# Patient Record
Sex: FEMALE | Race: WHITE | Employment: FULL TIME | URBAN - METROPOLITAN AREA
[De-identification: names, ages, dates, MRNs, and addresses within clinical notes are randomized per-mention and may not be internally consistent; named-entity substitution may affect disease eponyms.]

---

## 2017-01-16 ENCOUNTER — HOSPITAL ENCOUNTER (OUTPATIENT)
Dept: OTHER | Age: 57
Discharge: HOME | End: 2017-01-16
Attending: FAMILY MEDICINE | Admitting: FAMILY MEDICINE

## 2017-01-16 ENCOUNTER — OFFICE VISIT (OUTPATIENT)
Dept: FAMILY MEDICINE CLINIC | Age: 57
End: 2017-01-16

## 2017-01-16 VITALS
WEIGHT: 158 LBS | BODY MASS INDEX: 26.29 KG/M2 | DIASTOLIC BLOOD PRESSURE: 74 MMHG | SYSTOLIC BLOOD PRESSURE: 118 MMHG | TEMPERATURE: 97.8 F

## 2017-01-16 DIAGNOSIS — J30.1 SEASONAL ALLERGIC RHINITIS DUE TO POLLEN: ICD-10-CM

## 2017-01-16 DIAGNOSIS — J32.4 CHRONIC PANSINUSITIS: Primary | ICD-10-CM

## 2017-01-16 DIAGNOSIS — J32.4 CHRONIC PANSINUSITIS: ICD-10-CM

## 2017-01-16 PROCEDURE — 99213 OFFICE O/P EST LOW 20 MIN: CPT | Performed by: FAMILY MEDICINE

## 2017-01-16 RX ORDER — LEVOFLOXACIN 500 MG/1
500 TABLET, FILM COATED ORAL DAILY
Qty: 10 TABLET | Refills: 0 | Status: SHIPPED | OUTPATIENT
Start: 2017-01-16 | End: 2017-01-26

## 2017-01-16 ASSESSMENT — ENCOUNTER SYMPTOMS
RESPIRATORY NEGATIVE: 1
GASTROINTESTINAL NEGATIVE: 1
EYES NEGATIVE: 1

## 2017-01-17 ENCOUNTER — PATIENT MESSAGE (OUTPATIENT)
Dept: FAMILY MEDICINE CLINIC | Age: 57
End: 2017-01-17

## 2017-01-17 DIAGNOSIS — J01.90 ACUTE SINUSITIS, RECURRENCE NOT SPECIFIED, UNSPECIFIED LOCATION: Primary | ICD-10-CM

## 2017-01-17 PROCEDURE — 99444 PR PHYSICIAN ONLINE EVALUATION & MANAGEMENT SERVICE: CPT | Performed by: FAMILY MEDICINE

## 2017-01-19 DIAGNOSIS — K59.04 CHRONIC IDIOPATHIC CONSTIPATION: Primary | ICD-10-CM

## 2017-01-19 RX ORDER — LUBIPROSTONE 24 UG/1
24 CAPSULE, GELATIN COATED ORAL 2 TIMES DAILY WITH MEALS
Qty: 180 CAPSULE | Refills: 1 | Status: SHIPPED | OUTPATIENT
Start: 2017-01-19 | End: 2017-07-19 | Stop reason: SDUPTHER

## 2017-02-03 DIAGNOSIS — I10 ESSENTIAL HYPERTENSION: Primary | ICD-10-CM

## 2017-02-03 RX ORDER — HYDROCHLOROTHIAZIDE 12.5 MG/1
12.5 TABLET ORAL DAILY
Qty: 90 TABLET | Refills: 1 | Status: SHIPPED | OUTPATIENT
Start: 2017-02-03 | End: 2017-08-02 | Stop reason: SDUPTHER

## 2017-03-07 ENCOUNTER — OFFICE VISIT (OUTPATIENT)
Dept: RHEUMATOLOGY | Age: 57
End: 2017-03-07

## 2017-03-07 VITALS
DIASTOLIC BLOOD PRESSURE: 80 MMHG | WEIGHT: 157.2 LBS | BODY MASS INDEX: 26.16 KG/M2 | SYSTOLIC BLOOD PRESSURE: 110 MMHG | TEMPERATURE: 98.2 F

## 2017-03-07 DIAGNOSIS — L40.9 PSORIASIS: ICD-10-CM

## 2017-03-07 DIAGNOSIS — Z79.899 HIGH RISK MEDICATION USE: ICD-10-CM

## 2017-03-07 DIAGNOSIS — L40.50 PSORIATIC ARTHRITIS (HCC): Primary | ICD-10-CM

## 2017-03-07 PROCEDURE — 99215 OFFICE O/P EST HI 40 MIN: CPT | Performed by: INTERNAL MEDICINE

## 2017-03-22 DIAGNOSIS — F41.9 ANXIETY: ICD-10-CM

## 2017-03-22 RX ORDER — CLONAZEPAM 0.5 MG/1
0.5 TABLET ORAL 2 TIMES DAILY PRN
Qty: 90 TABLET | Refills: 0 | Status: SHIPPED | OUTPATIENT
Start: 2017-03-22 | End: 2017-04-28 | Stop reason: SDUPTHER

## 2017-03-23 DIAGNOSIS — F41.9 ANXIETY: ICD-10-CM

## 2017-03-23 RX ORDER — CLONAZEPAM 0.5 MG/1
0.5 TABLET ORAL 2 TIMES DAILY PRN
Qty: 90 TABLET | Refills: 0 | OUTPATIENT
Start: 2017-03-23

## 2017-04-24 RX ORDER — MELOXICAM 15 MG/1
15 TABLET ORAL DAILY
Qty: 90 TABLET | Refills: 1 | Status: SHIPPED | OUTPATIENT
Start: 2017-04-24 | End: 2017-05-09 | Stop reason: CLARIF

## 2017-04-28 DIAGNOSIS — F41.9 ANXIETY: ICD-10-CM

## 2017-04-29 RX ORDER — CLONAZEPAM 0.5 MG/1
0.5 TABLET ORAL 2 TIMES DAILY PRN
Qty: 90 TABLET | Refills: 0 | Status: SHIPPED | OUTPATIENT
Start: 2017-04-29 | End: 2017-07-24 | Stop reason: SDUPTHER

## 2017-05-04 DIAGNOSIS — Z79.899 HIGH RISK MEDICATION USE: ICD-10-CM

## 2017-05-04 DIAGNOSIS — I10 ESSENTIAL HYPERTENSION: ICD-10-CM

## 2017-05-04 DIAGNOSIS — E78.2 MIXED HYPERLIPIDEMIA: ICD-10-CM

## 2017-05-04 LAB
A/G RATIO: 2.1 (ref 1.1–2.2)
ALBUMIN SERPL-MCNC: 4.6 G/DL (ref 3.4–5)
ALP BLD-CCNC: 94 U/L (ref 40–129)
ALT SERPL-CCNC: 28 U/L (ref 10–40)
ANION GAP SERPL CALCULATED.3IONS-SCNC: 16 MMOL/L (ref 3–16)
AST SERPL-CCNC: 21 U/L (ref 15–37)
BASOPHILS ABSOLUTE: 0.1 K/UL (ref 0–0.2)
BASOPHILS RELATIVE PERCENT: 1.2 %
BILIRUB SERPL-MCNC: <0.2 MG/DL (ref 0–1)
BUN BLDV-MCNC: 26 MG/DL (ref 7–20)
C-REACTIVE PROTEIN: 0.6 MG/L (ref 0–5.1)
CALCIUM SERPL-MCNC: 9.7 MG/DL (ref 8.3–10.6)
CHLORIDE BLD-SCNC: 103 MMOL/L (ref 99–110)
CHOLESTEROL, TOTAL: 175 MG/DL (ref 0–199)
CO2: 26 MMOL/L (ref 21–32)
CREAT SERPL-MCNC: 0.8 MG/DL (ref 0.6–1.1)
EOSINOPHILS ABSOLUTE: 0.1 K/UL (ref 0–0.6)
EOSINOPHILS RELATIVE PERCENT: 2.2 %
GFR AFRICAN AMERICAN: >60
GFR NON-AFRICAN AMERICAN: >60
GLOBULIN: 2.2 G/DL
GLUCOSE BLD-MCNC: 98 MG/DL (ref 70–99)
HCT VFR BLD CALC: 44.5 % (ref 36–48)
HDLC SERPL-MCNC: 47 MG/DL (ref 40–60)
HEMOGLOBIN: 14.1 G/DL (ref 12–16)
LDL CHOLESTEROL CALCULATED: 91 MG/DL
LYMPHOCYTES ABSOLUTE: 2.2 K/UL (ref 1–5.1)
LYMPHOCYTES RELATIVE PERCENT: 33.8 %
MCH RBC QN AUTO: 26.3 PG (ref 26–34)
MCHC RBC AUTO-ENTMCNC: 31.8 G/DL (ref 31–36)
MCV RBC AUTO: 82.9 FL (ref 80–100)
MONOCYTES ABSOLUTE: 0.6 K/UL (ref 0–1.3)
MONOCYTES RELATIVE PERCENT: 8.8 %
NEUTROPHILS ABSOLUTE: 3.4 K/UL (ref 1.7–7.7)
NEUTROPHILS RELATIVE PERCENT: 54 %
PDW BLD-RTO: 13.8 % (ref 12.4–15.4)
PLATELET # BLD: 309 K/UL (ref 135–450)
PMV BLD AUTO: 8.5 FL (ref 5–10.5)
POTASSIUM SERPL-SCNC: 4.9 MMOL/L (ref 3.5–5.1)
RBC # BLD: 5.36 M/UL (ref 4–5.2)
SEDIMENTATION RATE, ERYTHROCYTE: 6 MM/HR (ref 0–30)
SODIUM BLD-SCNC: 145 MMOL/L (ref 136–145)
TOTAL PROTEIN: 6.8 G/DL (ref 6.4–8.2)
TRIGL SERPL-MCNC: 183 MG/DL (ref 0–150)
VLDLC SERPL CALC-MCNC: 37 MG/DL
WBC # BLD: 6.4 K/UL (ref 4–11)

## 2017-05-09 ENCOUNTER — OFFICE VISIT (OUTPATIENT)
Dept: FAMILY MEDICINE CLINIC | Age: 57
End: 2017-05-09

## 2017-05-09 VITALS
HEIGHT: 65 IN | DIASTOLIC BLOOD PRESSURE: 82 MMHG | SYSTOLIC BLOOD PRESSURE: 102 MMHG | OXYGEN SATURATION: 100 % | HEART RATE: 91 BPM | BODY MASS INDEX: 26.06 KG/M2 | TEMPERATURE: 98 F | WEIGHT: 156.4 LBS

## 2017-05-09 DIAGNOSIS — K58.9 IRRITABLE BOWEL SYNDROME WITHOUT DIARRHEA: ICD-10-CM

## 2017-05-09 DIAGNOSIS — D35.2 PITUITARY ADENOMA (HCC): ICD-10-CM

## 2017-05-09 DIAGNOSIS — G51.39 HEMIFACIAL SPASM: ICD-10-CM

## 2017-05-09 DIAGNOSIS — K59.04 CHRONIC IDIOPATHIC CONSTIPATION: ICD-10-CM

## 2017-05-09 DIAGNOSIS — J30.1 SEASONAL ALLERGIC RHINITIS DUE TO POLLEN: ICD-10-CM

## 2017-05-09 DIAGNOSIS — E55.9 VITAMIN D DEFICIENCY: ICD-10-CM

## 2017-05-09 DIAGNOSIS — Z11.4 SCREENING FOR HIV (HUMAN IMMUNODEFICIENCY VIRUS): ICD-10-CM

## 2017-05-09 DIAGNOSIS — Z11.59 NEED FOR HEPATITIS C SCREENING TEST: ICD-10-CM

## 2017-05-09 DIAGNOSIS — Z23 NEED FOR TDAP VACCINATION: ICD-10-CM

## 2017-05-09 DIAGNOSIS — M19.90 ARTHRITIS: ICD-10-CM

## 2017-05-09 DIAGNOSIS — E78.2 MIXED HYPERLIPIDEMIA: ICD-10-CM

## 2017-05-09 DIAGNOSIS — I10 ESSENTIAL HYPERTENSION: ICD-10-CM

## 2017-05-09 DIAGNOSIS — Z00.00 ROUTINE GENERAL MEDICAL EXAMINATION AT A HEALTH CARE FACILITY: Primary | ICD-10-CM

## 2017-05-09 DIAGNOSIS — F41.9 ANXIETY: ICD-10-CM

## 2017-05-09 PROCEDURE — 90715 TDAP VACCINE 7 YRS/> IM: CPT | Performed by: FAMILY MEDICINE

## 2017-05-09 PROCEDURE — 90471 IMMUNIZATION ADMIN: CPT | Performed by: FAMILY MEDICINE

## 2017-05-09 PROCEDURE — 99396 PREV VISIT EST AGE 40-64: CPT | Performed by: FAMILY MEDICINE

## 2017-05-09 ASSESSMENT — ENCOUNTER SYMPTOMS
EYES NEGATIVE: 1
ALLERGIC/IMMUNOLOGIC NEGATIVE: 1
GASTROINTESTINAL NEGATIVE: 1
RESPIRATORY NEGATIVE: 1

## 2017-05-30 ENCOUNTER — OFFICE VISIT (OUTPATIENT)
Dept: DERMATOLOGY | Age: 57
End: 2017-05-30

## 2017-05-30 DIAGNOSIS — Z87.2 HISTORY OF ACTINIC KERATOSES: ICD-10-CM

## 2017-05-30 DIAGNOSIS — D22.9 MULTIPLE NEVI: Primary | ICD-10-CM

## 2017-05-30 DIAGNOSIS — L82.0 INFLAMED SEBORRHEIC KERATOSIS: ICD-10-CM

## 2017-05-30 DIAGNOSIS — L40.9 SCALP PSORIASIS: ICD-10-CM

## 2017-05-30 PROCEDURE — 99214 OFFICE O/P EST MOD 30 MIN: CPT | Performed by: DERMATOLOGY

## 2017-05-30 PROCEDURE — 17110 DESTRUCTION B9 LES UP TO 14: CPT | Performed by: DERMATOLOGY

## 2017-05-30 RX ORDER — TRETINOIN 0.5 MG/G
CREAM TOPICAL
Qty: 45 G | Refills: 3 | Status: SHIPPED | OUTPATIENT
Start: 2017-05-30 | End: 2019-12-02 | Stop reason: SDUPTHER

## 2017-06-01 DIAGNOSIS — I10 ESSENTIAL HYPERTENSION: ICD-10-CM

## 2017-06-01 RX ORDER — LISINOPRIL 20 MG/1
20 TABLET ORAL DAILY
Qty: 90 TABLET | Refills: 1 | Status: SHIPPED | OUTPATIENT
Start: 2017-06-01 | End: 2017-11-30 | Stop reason: SDUPTHER

## 2017-06-06 ENCOUNTER — OFFICE VISIT (OUTPATIENT)
Dept: RHEUMATOLOGY | Age: 57
End: 2017-06-06

## 2017-06-06 VITALS
SYSTOLIC BLOOD PRESSURE: 116 MMHG | TEMPERATURE: 98.5 F | WEIGHT: 158 LBS | HEIGHT: 65 IN | BODY MASS INDEX: 26.33 KG/M2 | HEART RATE: 74 BPM | DIASTOLIC BLOOD PRESSURE: 80 MMHG

## 2017-06-06 DIAGNOSIS — Z79.899 HIGH RISK MEDICATION USE: ICD-10-CM

## 2017-06-06 DIAGNOSIS — L40.50 PSORIATIC ARTHRITIS (HCC): Primary | ICD-10-CM

## 2017-06-06 DIAGNOSIS — L40.9 PSORIASIS: ICD-10-CM

## 2017-06-06 PROCEDURE — 99214 OFFICE O/P EST MOD 30 MIN: CPT | Performed by: INTERNAL MEDICINE

## 2017-06-10 DIAGNOSIS — E78.2 MIXED HYPERLIPIDEMIA: ICD-10-CM

## 2017-06-10 RX ORDER — ATORVASTATIN CALCIUM 20 MG/1
10 TABLET, FILM COATED ORAL DAILY
Qty: 45 TABLET | Refills: 1 | Status: SHIPPED | OUTPATIENT
Start: 2017-06-10 | End: 2017-09-22 | Stop reason: SDUPTHER

## 2017-07-19 DIAGNOSIS — K59.04 CHRONIC IDIOPATHIC CONSTIPATION: ICD-10-CM

## 2017-07-19 RX ORDER — LUBIPROSTONE 24 UG/1
24 CAPSULE, GELATIN COATED ORAL 2 TIMES DAILY WITH MEALS
Qty: 180 CAPSULE | Refills: 1 | Status: SHIPPED | OUTPATIENT
Start: 2017-07-19 | End: 2018-01-15 | Stop reason: SDUPTHER

## 2017-07-24 DIAGNOSIS — F41.9 ANXIETY: ICD-10-CM

## 2017-07-24 RX ORDER — CLONAZEPAM 0.5 MG/1
0.5 TABLET ORAL 2 TIMES DAILY PRN
Qty: 90 TABLET | Refills: 0 | Status: SHIPPED | OUTPATIENT
Start: 2017-07-24 | End: 2017-10-31 | Stop reason: SDUPTHER

## 2017-08-02 DIAGNOSIS — I10 ESSENTIAL HYPERTENSION: ICD-10-CM

## 2017-08-02 RX ORDER — HYDROCHLOROTHIAZIDE 12.5 MG/1
12.5 TABLET ORAL DAILY
Qty: 90 TABLET | Refills: 1 | Status: SHIPPED | OUTPATIENT
Start: 2017-08-02 | End: 2018-02-01 | Stop reason: SDUPTHER

## 2017-08-08 ENCOUNTER — TELEPHONE (OUTPATIENT)
Dept: DERMATOLOGY | Age: 57
End: 2017-08-08

## 2017-09-06 ENCOUNTER — OFFICE VISIT (OUTPATIENT)
Dept: RHEUMATOLOGY | Age: 57
End: 2017-09-06

## 2017-09-06 VITALS
WEIGHT: 160 LBS | HEIGHT: 65 IN | HEART RATE: 80 BPM | DIASTOLIC BLOOD PRESSURE: 78 MMHG | BODY MASS INDEX: 26.66 KG/M2 | SYSTOLIC BLOOD PRESSURE: 118 MMHG | TEMPERATURE: 98 F

## 2017-09-06 DIAGNOSIS — L40.50 PSORIATIC ARTHRITIS (HCC): Primary | ICD-10-CM

## 2017-09-06 DIAGNOSIS — L40.9 PSORIASIS: ICD-10-CM

## 2017-09-06 DIAGNOSIS — M17.0 PRIMARY OSTEOARTHRITIS OF BOTH KNEES: ICD-10-CM

## 2017-09-06 PROCEDURE — 99214 OFFICE O/P EST MOD 30 MIN: CPT | Performed by: INTERNAL MEDICINE

## 2017-09-22 DIAGNOSIS — E78.2 MIXED HYPERLIPIDEMIA: ICD-10-CM

## 2017-09-22 RX ORDER — ATORVASTATIN CALCIUM 20 MG/1
20 TABLET, FILM COATED ORAL DAILY
Qty: 90 TABLET | Refills: 1 | Status: SHIPPED | OUTPATIENT
Start: 2017-09-22 | End: 2018-03-02 | Stop reason: SDUPTHER

## 2017-09-27 RX ORDER — AMOXICILLIN AND CLAVULANATE POTASSIUM 875; 125 MG/1; MG/1
1 TABLET, FILM COATED ORAL 2 TIMES DAILY
Qty: 20 TABLET | Refills: 0 | Status: SHIPPED | OUTPATIENT
Start: 2017-09-27 | End: 2017-12-12 | Stop reason: SDUPTHER

## 2017-09-27 RX ORDER — FLUCONAZOLE 150 MG/1
150 TABLET ORAL ONCE
Qty: 2 TABLET | Refills: 0 | Status: SHIPPED | OUTPATIENT
Start: 2017-09-27 | End: 2017-09-27

## 2017-10-16 DIAGNOSIS — F41.9 ANXIETY: ICD-10-CM

## 2017-10-16 RX ORDER — CLONAZEPAM 0.5 MG/1
0.5 TABLET ORAL 2 TIMES DAILY PRN
Qty: 90 TABLET | Refills: 0 | OUTPATIENT
Start: 2017-10-16

## 2017-10-23 RX ORDER — MELOXICAM 15 MG/1
TABLET ORAL
Qty: 90 TABLET | Refills: 1 | Status: SHIPPED | OUTPATIENT
Start: 2017-10-23 | End: 2018-03-23 | Stop reason: SDUPTHER

## 2017-10-31 ENCOUNTER — OFFICE VISIT (OUTPATIENT)
Dept: FAMILY MEDICINE CLINIC | Age: 57
End: 2017-10-31

## 2017-10-31 ENCOUNTER — TELEPHONE (OUTPATIENT)
Dept: FAMILY MEDICINE CLINIC | Age: 57
End: 2017-10-31

## 2017-10-31 VITALS
DIASTOLIC BLOOD PRESSURE: 78 MMHG | WEIGHT: 159.6 LBS | BODY MASS INDEX: 26.56 KG/M2 | SYSTOLIC BLOOD PRESSURE: 106 MMHG | TEMPERATURE: 98.2 F

## 2017-10-31 DIAGNOSIS — I10 ESSENTIAL HYPERTENSION: ICD-10-CM

## 2017-10-31 DIAGNOSIS — E55.9 VITAMIN D DEFICIENCY: ICD-10-CM

## 2017-10-31 DIAGNOSIS — G25.81 RESTLESS LEGS SYNDROME (RLS): Primary | ICD-10-CM

## 2017-10-31 DIAGNOSIS — E78.2 MIXED HYPERLIPIDEMIA: ICD-10-CM

## 2017-10-31 DIAGNOSIS — K59.09 CHRONIC CONSTIPATION: ICD-10-CM

## 2017-10-31 DIAGNOSIS — F41.9 ANXIETY: ICD-10-CM

## 2017-10-31 DIAGNOSIS — F43.9 STRESS AT HOME: ICD-10-CM

## 2017-10-31 PROCEDURE — 99214 OFFICE O/P EST MOD 30 MIN: CPT | Performed by: FAMILY MEDICINE

## 2017-10-31 RX ORDER — CLONAZEPAM 0.5 MG/1
0.5 TABLET ORAL 2 TIMES DAILY PRN
Qty: 90 TABLET | Refills: 0 | Status: SHIPPED | OUTPATIENT
Start: 2017-10-31 | End: 2017-10-31 | Stop reason: SDUPTHER

## 2017-10-31 RX ORDER — CLONAZEPAM 0.5 MG/1
0.5 TABLET ORAL 2 TIMES DAILY PRN
Qty: 90 TABLET | Refills: 0 | Status: SHIPPED | OUTPATIENT
Start: 2017-10-31 | End: 2017-12-22 | Stop reason: SDUPTHER

## 2017-10-31 ASSESSMENT — PATIENT HEALTH QUESTIONNAIRE - PHQ9
SUM OF ALL RESPONSES TO PHQ QUESTIONS 1-9: 2
2. FEELING DOWN, DEPRESSED OR HOPELESS: 1
SUM OF ALL RESPONSES TO PHQ9 QUESTIONS 1 & 2: 2
1. LITTLE INTEREST OR PLEASURE IN DOING THINGS: 1

## 2017-10-31 ASSESSMENT — ENCOUNTER SYMPTOMS
RESPIRATORY NEGATIVE: 1
GASTROINTESTINAL NEGATIVE: 1
EYES NEGATIVE: 1

## 2017-10-31 NOTE — TELEPHONE ENCOUNTER
Since Pt is getting 90 day Rx of clonazepam, she needs it sent to Mail Order: [facility].   Thanks

## 2017-10-31 NOTE — PROGRESS NOTES
10/31/17    Rip Prows  1960      Chief Complaint   Patient presents with    Hyperlipidemia     Hypertension: Patient here for follow-up of elevated blood pressure. She is exercising and is adherent to low salt diet. Blood pressure is well controlled at home. Cardiac symptoms none. Patient denies chest pain, claudication, fatigue and irregular heart beat. Cardiovascular risk factors: dyslipidemia and hypertension. Use of agents associated with hypertension: none. History of target organ damage: none. Dyslipidemia: Patient presents for evaluation of lipids. Compliance with treatment thus far has been good. A repeat fasting lipid profile was not done. The patient does not use medications that may worsen dyslipidemias (corticosteroids, progestins, anabolic steroids, diuretics, beta-blockers, amiodarone, cyclosporine, olanzapine). The patient exercises intermittently. The patient is not known to have coexisting coronary artery disease. Anxiety: Patient complains of evaluation of anxiety disorder. She has the following anxiety symptoms: chest pain, difficulty concentrating, feelings of losing control. Onset of symptoms was approximately several years ago, controlled since that time. She denies current suicidal and homicidal ideation. Family history significant for no psychiatric illness. Possible organic causes contributing are: none. Risk factors: previous episode of depression Previous treatment includes  and none. She complains of the following side effects from the treatment: none. She has a long history of needing to move legs in the eveing and at night. Uses the clonazepam primarily for that.    Vitals:    10/31/17 0810   BP: 106/78   Temp: 98.2 °F (36.8 °C)         Immunization History   Administered Date(s) Administered    Influenza Nasal 10/06/2010    Influenza Virus Vaccine 09/30/2015, 10/10/2017    Influenza, Quadv, 3 Years and older, IM 11/13/2016    MMR 11/08/2006  Pneumococcal Polysaccharide ([id number]) 12/06/2016    Td 11/08/2006    Tdap (Boostrix, Adacel) 11/08/2006, 05/09/2017       Allergies   Allergen Reactions    Sulfa Antibiotics Rash    Codeine      Outpatient Prescriptions Marked as Taking for the 10/31/17 encounter (Office Visit) with Zack Palencia MD   Medication Sig Dispense Refill    [DISCONTINUED] clonazePAM (KLONOPIN) 0.5 MG tablet Take 1 tablet by mouth 2 times daily as needed for Anxiety 90 tablet 0    meloxicam (MOBIC) 15 MG tablet TAKE 1 TABLET DAILY 90 tablet 1    CLOBEX SPRAY 0.05 % LIQD APPLY TO SCALP DAILY TO TWICE A DAY AS NEEDED FOR FLARES 250 mL 3    atorvastatin (LIPITOR) 20 MG tablet Take 1 tablet by mouth daily 90 tablet 1    hydrochlorothiazide (HYDRODIURIL) 12.5 MG tablet Take 1 tablet by mouth daily 90 tablet 1    lubiprostone (AMITIZA) 24 MCG capsule Take 1 capsule by mouth 2 times daily (with meals) 180 capsule 1    ENBREL SURECLICK 50 MG/ML SOAJ INJECT 50 MG (1 PEN) UNDER THE SKIN EVERY 7 DAYS 3.92 mL 3    lisinopril (PRINIVIL;ZESTRIL) 20 MG tablet Take 1 tablet by mouth daily 90 tablet 1    tretinoin (RETIN-A) 0.05 % cream Apply a pea sized amount to the face QHS 45 g 3    fluticasone (FLONASE) 50 MCG/ACT nasal spray 1 spray by Nasal route daily      estradiol (VAGIFEM) 25 MCG vaginal tablet Place 25 mcg vaginally daily.  Ascorbic Acid (VITAMIN C) 500 MG tablet Take 1,000 mg by mouth daily.  Cholecalciferol (VITAMIN D) 1000 UNIT CAPS Take 1 capsule by mouth daily.  cetirizine (ZYRTEC) 10 MG tablet Take 1 tablet by mouth daily. 90 tablet 1    Flaxseed, Linseed, (FLAXSEED OIL) 1000 MG CAPS Take  by mouth.  Green Tea 250 MG CAPS Take  by mouth daily.  Calcium-Magnesium 426-246 MG TABS Take  by mouth daily.  PROBIOTIC CAPS Take  by mouth daily.          Past Medical History:   Diagnosis Date    Actinic keratosis 6/28/2010    Allergic rhinitis     Anxiety 6/28/2010    Cancer ([facility] [address] ovarian    Chronic idiopathic constipation 2017    Hemifacial spasm     Botox    Hyperlipidemia     Osteoarthritis     Psoriasis     Psoriatic arthritis ([facility] [address]      Past Surgical History:   Procedure Laterality Date    BRAIN SURGERY      for blood vessel abnormality    BREAST REDUCTION SURGERY       SECTION      COLONOSCOPY  12.    FINGER SURGERY  2007    Left index finger     HYSTERECTOMY      INNER EAR SURGERY  2008    repair artery right ear    NEUROMA SURGERY      microvascular reconstruction to fix hemifacial spasm    RHINOPLASTY      SHOULDER SURGERY      left shoulder    AMRITA AND BSO      TOE SURGERY      TONSILLECTOMY      VARICOSE VEIN SURGERY      WRIST SURGERY      right wrist     Family History   Problem Relation Age of Onset    Heart Disease Father      chf    High Blood Pressure Father     High Cholesterol Father     Diabetes Father     Kidney Disease Father     High Blood Pressure Brother     Depression Brother     Diabetes Brother     Cancer Paternal Grandmother     Stroke Paternal Grandmother     Stroke Paternal Grandfather     Cancer Mother      skin    Cancer Sister      breast     Social History     Social History    Marital status:      Spouse name: N/A    Number of children: N/A    Years of education: N/A     Occupational History    Not on file. Social History Main Topics    Smoking status: Never Smoker    Smokeless tobacco: Never Used    Alcohol use Yes    Drug use: No    Sexual activity: Yes     Other Topics Concern    Not on file     Social History Narrative    No narrative on file         Any recent diagnostic tests, hospital reports, office notes or consultation letters were reviewed prior to and during the visit. Review of Systems   Constitutional: Negative. HENT: Negative. Eyes: Negative. Respiratory: Negative. Cardiovascular: Negative. Gastrointestinal: Negative. Genitourinary: Negative.

## 2017-11-02 ENCOUNTER — PROCEDURE VISIT (OUTPATIENT)
Dept: DERMATOLOGY | Age: 57
End: 2017-11-02

## 2017-11-02 DIAGNOSIS — L81.4 LENTIGINES: Primary | ICD-10-CM

## 2017-11-02 DIAGNOSIS — Z41.1 ELECTIVE PROCEDURE FOR UNACCEPTABLE COSMETIC APPEARANCE: ICD-10-CM

## 2017-11-02 PROCEDURE — DM01510 PIGMENTED LASER 6-10 LESIONS: Performed by: DERMATOLOGY

## 2017-11-02 NOTE — PROGRESS NOTES
Laser Procedure Note       Chaitanya Novak (previously Larry Farmer)  YOB: 1960    DATE OF VISIT:  11/2/2017    LASER: IPL/GL  DIAGNOSIS: lentigines     Here for elective removal of tan lesions/lentigines on the arms. Previously discussed laser - IPL + GL for chest and scattered lesions on the arms/legs - 200 + 150 and need for multiple tx for the chest.  Discussed risk crusting/scabbing and erythema, incomplete clearance and wound care. Baseline photos:          PATIENT IDENTIFIED PER PROTOCOL: yes  LOCATION(S): arms  VERIFIED AND MARKED: yes  TECHNIQUES, RISKS, BENEFITS AND ALTERNATIVES EXPLAINED: yes  CONSENT SIGNED, WITNESSED AND DATED: yes        OPERATIVE REPORT    DIAGNOSIS, LOCATION, PROCEDURE RECONFIRMED: yes   EYE PROTECTION: yes  ANESTHESIA/PRE-OP MEDICATIONS: none  LASER SETTINGS:  (1)  WAVELENGTH: IPL - 10 J    (2)  WAVELENGTH: 755 - GL  LENS: 8 mm  FLUENCE: 40 J   COOLING: off    Ed mult trx and may have to trx with Ln2    PROCEDURE NOTE:  US gel applied to the arms/hands and treated with single pass with IPL. Then individual lesions treated with single pulses with setting 2 with appropriate response. POST-OPERATIVE CARE/DISPOSITION: aquaphor  COMPLICATIONS: none  MEDICATIONS: none  WOUND CARE INSTRUCTIONS PROVIDED: yes    F/u 1-2 mos.     [id number] [facility]

## 2017-11-10 ENCOUNTER — TELEPHONE (OUTPATIENT)
Dept: OTHER | Facility: CLINIC | Age: 57
End: 2017-11-10

## 2017-11-10 NOTE — TELEPHONE ENCOUNTER
Attempted to contact patient regarding colonoscopy report. Number was OOS. Need to get a copy of report and attach it to HM order.

## 2017-11-14 DIAGNOSIS — G25.81 RESTLESS LEGS SYNDROME (RLS): ICD-10-CM

## 2017-11-14 DIAGNOSIS — E78.2 MIXED HYPERLIPIDEMIA: ICD-10-CM

## 2017-11-14 DIAGNOSIS — Z11.4 SCREENING FOR HIV (HUMAN IMMUNODEFICIENCY VIRUS): ICD-10-CM

## 2017-11-14 DIAGNOSIS — Z11.59 NEED FOR HEPATITIS C SCREENING TEST: ICD-10-CM

## 2017-11-14 DIAGNOSIS — E55.9 VITAMIN D DEFICIENCY: ICD-10-CM

## 2017-11-14 DIAGNOSIS — Z79.899 HIGH RISK MEDICATION USE: ICD-10-CM

## 2017-11-14 LAB
A/G RATIO: 1.7 (ref 1.1–2.2)
ALBUMIN SERPL-MCNC: 4.3 G/DL (ref 3.4–5)
ALP BLD-CCNC: 84 U/L (ref 40–129)
ALT SERPL-CCNC: 24 U/L (ref 10–40)
ANION GAP SERPL CALCULATED.3IONS-SCNC: 17 MMOL/L (ref 3–16)
AST SERPL-CCNC: 20 U/L (ref 15–37)
BILIRUB SERPL-MCNC: <0.2 MG/DL (ref 0–1)
BUN BLDV-MCNC: 19 MG/DL (ref 7–20)
CALCIUM SERPL-MCNC: 9.5 MG/DL (ref 8.3–10.6)
CHLORIDE BLD-SCNC: 105 MMOL/L (ref 99–110)
CHOLESTEROL, TOTAL: 170 MG/DL (ref 0–199)
CO2: 25 MMOL/L (ref 21–32)
CREAT SERPL-MCNC: 0.8 MG/DL (ref 0.6–1.1)
FERRITIN: 66.5 NG/ML (ref 15–150)
GFR AFRICAN AMERICAN: >60
GFR NON-AFRICAN AMERICAN: >60
GLOBULIN: 2.5 G/DL
GLUCOSE BLD-MCNC: 93 MG/DL (ref 70–99)
HDLC SERPL-MCNC: 46 MG/DL (ref 40–60)
HEPATITIS C ANTIBODY INTERPRETATION: NORMAL
LDL CHOLESTEROL CALCULATED: 72 MG/DL
POTASSIUM SERPL-SCNC: 4.1 MMOL/L (ref 3.5–5.1)
SODIUM BLD-SCNC: 147 MMOL/L (ref 136–145)
TOTAL PROTEIN: 6.8 G/DL (ref 6.4–8.2)
TRIGL SERPL-MCNC: 258 MG/DL (ref 0–150)
VITAMIN D 25-HYDROXY: 48.7 NG/ML
VLDLC SERPL CALC-MCNC: 52 MG/DL

## 2017-11-15 LAB — HIV-1 AND HIV-2 ANTIBODIES: NORMAL

## 2017-11-30 DIAGNOSIS — I10 ESSENTIAL HYPERTENSION: ICD-10-CM

## 2017-11-30 RX ORDER — LISINOPRIL 20 MG/1
20 TABLET ORAL DAILY
Qty: 90 TABLET | Refills: 1 | Status: SHIPPED | OUTPATIENT
Start: 2017-11-30 | End: 2018-05-06 | Stop reason: SDUPTHER

## 2017-12-12 ENCOUNTER — OFFICE VISIT (OUTPATIENT)
Dept: DERMATOLOGY | Age: 57
End: 2017-12-12

## 2017-12-12 ENCOUNTER — OFFICE VISIT (OUTPATIENT)
Dept: FAMILY MEDICINE CLINIC | Age: 57
End: 2017-12-12

## 2017-12-12 VITALS
HEART RATE: 85 BPM | HEIGHT: 65 IN | OXYGEN SATURATION: 97 % | TEMPERATURE: 98.8 F | DIASTOLIC BLOOD PRESSURE: 74 MMHG | SYSTOLIC BLOOD PRESSURE: 110 MMHG

## 2017-12-12 DIAGNOSIS — Z87.2 HISTORY OF ACTINIC KERATOSES: ICD-10-CM

## 2017-12-12 DIAGNOSIS — D22.9 MULTIPLE NEVI: Primary | ICD-10-CM

## 2017-12-12 DIAGNOSIS — J01.90 ACUTE SINUSITIS, RECURRENCE NOT SPECIFIED, UNSPECIFIED LOCATION: ICD-10-CM

## 2017-12-12 DIAGNOSIS — D48.5 NEOPLASM OF UNCERTAIN BEHAVIOR OF SKIN: ICD-10-CM

## 2017-12-12 DIAGNOSIS — L40.9 SCALP PSORIASIS: ICD-10-CM

## 2017-12-12 PROCEDURE — 11101 PR BIOPSY, EACH ADDED LESION: CPT | Performed by: DERMATOLOGY

## 2017-12-12 PROCEDURE — 99213 OFFICE O/P EST LOW 20 MIN: CPT | Performed by: DERMATOLOGY

## 2017-12-12 PROCEDURE — 11100 PR BIOPSY OF SKIN LESION: CPT | Performed by: DERMATOLOGY

## 2017-12-12 RX ORDER — ESTRADIOL 10 UG/1
INSERT VAGINAL
COMMUNITY
Start: 2017-11-28 | End: 2018-03-23 | Stop reason: SDUPTHER

## 2017-12-12 RX ORDER — FLUCONAZOLE 150 MG/1
TABLET ORAL
Qty: 2 TABLET | Refills: 0 | Status: SHIPPED | OUTPATIENT
Start: 2017-12-12 | End: 2017-12-13

## 2017-12-12 RX ORDER — AMOXICILLIN AND CLAVULANATE POTASSIUM 875; 125 MG/1; MG/1
1 TABLET, FILM COATED ORAL 2 TIMES DAILY
Qty: 20 TABLET | Refills: 0 | Status: SHIPPED | OUTPATIENT
Start: 2017-12-12 | End: 2018-04-02 | Stop reason: SDUPTHER

## 2017-12-12 ASSESSMENT — ENCOUNTER SYMPTOMS
SORE THROAT: 0
SHORTNESS OF BREATH: 0
SWOLLEN GLANDS: 0
SINUS COMPLAINT: 1
GASTROINTESTINAL NEGATIVE: 1
SINUS PAIN: 1
ALLERGIC/IMMUNOLOGIC NEGATIVE: 1
HOARSE VOICE: 0
RHINORRHEA: 1
COUGH: 1
EYES NEGATIVE: 1
SINUS PRESSURE: 1

## 2017-12-12 NOTE — PROGRESS NOTES
12/12/17    Agnes Kern  1960      Chief Complaint   Patient presents with    Sinus Problem     cough       Vitals:    12/12/17 0925   BP: 110/74   Pulse: 85   Temp: 98.8 °F (37.1 °C)   SpO2: 97%         Immunization History   Administered Date(s) Administered    Influenza Nasal 10/06/2010    Influenza Virus Vaccine 09/30/2015, 10/10/2017    Influenza, Aleshia Tyson, 3 Years and older, IM 11/13/2016    MMR 11/08/2006    Pneumococcal Polysaccharide ([id number]) 12/06/2016    Td 11/08/2006    Tdap (Boostrix, Adacel) 11/08/2006, 05/09/2017       Allergies   Allergen Reactions    Sulfa Antibiotics Rash    Codeine      Outpatient Prescriptions Marked as Taking for the 12/12/17 encounter (Office Visit) with Lance Hoffmann NP   Medication Sig Dispense Refill    Estradiol (VAGIFEM) 10 MCG TABS vaginal tablet       amoxicillin-clavulanate (AUGMENTIN) 875-125 MG per tablet Take 1 tablet by mouth 2 times daily for 10 days 20 tablet 0    fluconazole (DIFLUCAN) 150 MG tablet Take one po now and repeat one po in three days 2 tablet 0    lisinopril (PRINIVIL;ZESTRIL) 20 MG tablet Take 1 tablet by mouth daily 90 tablet 1    ENBREL SURECLICK 50 MG/ML SOAJ INJECT 50 MG (1 PEN) UNDER THE SKIN EVERY 7 DAYS 3.92 mL 2    clonazePAM (KLONOPIN) 0.5 MG tablet Take 1 tablet by mouth 2 times daily as needed for Anxiety 90 tablet 0    meloxicam (MOBIC) 15 MG tablet TAKE 1 TABLET DAILY 90 tablet 1    CLOBEX SPRAY 0.05 % LIQD APPLY TO SCALP DAILY TO TWICE A DAY AS NEEDED FOR FLARES 250 mL 3    atorvastatin (LIPITOR) 20 MG tablet Take 1 tablet by mouth daily 90 tablet 1    hydrochlorothiazide (HYDRODIURIL) 12.5 MG tablet Take 1 tablet by mouth daily 90 tablet 1    lubiprostone (AMITIZA) 24 MCG capsule Take 1 capsule by mouth 2 times daily (with meals) 180 capsule 1    tretinoin (RETIN-A) 0.05 % cream Apply a pea sized amount to the face QHS 45 g 3    fluticasone (FLONASE) 50 MCG/ACT nasal spray 1 spray by Nasal route on file. Social History Main Topics    Smoking status: Never Smoker    Smokeless tobacco: Never Used    Alcohol use Yes    Drug use: No    Sexual activity: Yes     Other Topics Concern    Not on file     Social History Narrative    No narrative on file         Any recent diagnostic tests, hospital reports, office notes or consultation letters were reviewed prior to and during the visit. Sinus Problem   This is a new problem. The current episode started in the past 7 days. The problem has been gradually worsening since onset. The pain is severe. Associated symptoms include congestion, coughing, headaches and sinus pressure. Pertinent negatives include no chills, diaphoresis, ear pain, hoarse voice, neck pain, shortness of breath, sneezing, sore throat or swollen glands. Past treatments include oral decongestants (antihistamine). The treatment provided no relief. Review of Systems   Constitutional: Negative. Negative for chills and diaphoresis. HENT: Positive for congestion, rhinorrhea, sinus pain and sinus pressure. Negative for ear pain, hoarse voice, sneezing and sore throat. Eyes: Negative. Respiratory: Positive for cough. Negative for shortness of breath. Cardiovascular: Negative. Gastrointestinal: Negative. Endocrine: Negative. Genitourinary: Negative. Musculoskeletal: Negative. Negative for neck pain. Skin: Negative. Allergic/Immunologic: Negative. Neurological: Positive for headaches. Hematological: Negative. Psychiatric/Behavioral: Negative. Physical Exam   Constitutional: She is oriented to person, place, and time. She appears well-developed and well-nourished. HENT:   Head: Normocephalic. Right Ear: External ear normal.   Left Ear: External ear normal.   Mouth/Throat: Oropharynx is clear and moist. No oropharyngeal exudate.    Nasal congestion and facial pain     Eyes: Conjunctivae are normal. Pupils are equal, round, and reactive to

## 2017-12-12 NOTE — PROGRESS NOTES
 NEUROMA SURGERY      microvascular reconstruction to fix hemifacial spasm    RHINOPLASTY      SHOULDER SURGERY      left shoulder    AMRITA AND BSO      TOE SURGERY      TONSILLECTOMY      VARICOSE VEIN SURGERY      WRIST SURGERY      right wrist     Allergies   Allergen Reactions    Sulfa Antibiotics Rash    Codeine      Outpatient Prescriptions Marked as Taking for the 12/12/17 encounter (Office Visit) with Estephanie Greenfield MD   Medication Sig Dispense Refill    lisinopril (PRINIVIL;ZESTRIL) 20 MG tablet Take 1 tablet by mouth daily 90 tablet 1    ENBREL SURECLICK 50 MG/ML SOAJ INJECT 50 MG (1 PEN) UNDER THE SKIN EVERY 7 DAYS 3.92 mL 2    clonazePAM (KLONOPIN) 0.5 MG tablet Take 1 tablet by mouth 2 times daily as needed for Anxiety 90 tablet 0    meloxicam (MOBIC) 15 MG tablet TAKE 1 TABLET DAILY 90 tablet 1    CLOBEX SPRAY 0.05 % LIQD APPLY TO SCALP DAILY TO TWICE A DAY AS NEEDED FOR FLARES 250 mL 3    atorvastatin (LIPITOR) 20 MG tablet Take 1 tablet by mouth daily 90 tablet 1    hydrochlorothiazide (HYDRODIURIL) 12.5 MG tablet Take 1 tablet by mouth daily 90 tablet 1    lubiprostone (AMITIZA) 24 MCG capsule Take 1 capsule by mouth 2 times daily (with meals) 180 capsule 1    tretinoin (RETIN-A) 0.05 % cream Apply a pea sized amount to the face QHS 45 g 3    fluticasone (FLONASE) 50 MCG/ACT nasal spray 1 spray by Nasal route daily      estradiol (VAGIFEM) 25 MCG vaginal tablet Place 25 mcg vaginally daily.  Ascorbic Acid (VITAMIN C) 500 MG tablet Take 1,000 mg by mouth daily.  Cholecalciferol (VITAMIN D) 1000 UNIT CAPS Take 1 capsule by mouth daily.  cetirizine (ZYRTEC) 10 MG tablet Take 1 tablet by mouth daily. 90 tablet 1    Flaxseed, Linseed, (FLAXSEED OIL) 1000 MG CAPS Take  by mouth.  Green Tea 250 MG CAPS Take  by mouth daily.  Calcium-Magnesium 426-246 MG TABS Take  by mouth daily.  PROBIOTIC CAPS Take  by mouth daily.            Social History:  Occupation:  [employer]    Physical Examination     Gen, well-appearing  The following were examined and determined to be normal: Psych/Neuro, Conjunctivae/eyelids, Gums/teeth/lips, Neck, Abdomen, RUE, RLE, LLE, Nails/digits and Groin/buttocks. Back, face, chest. neck  The following were examined and determined to be abnormal: scalp, chest, LUE  1. trunk and extremities with scattered brown macules and papules; darkest is on the abdomen - 2 mm macule - stable  2. No lesions concerning for AK's  3. Occipital scalp with mild erythematous silvery-scaled patches/thin plaque  4. L chest with crusted pink 3 mm papule  L upper anterior thigh with dark brown/red 2-3 mm thin papule    Assessment and Plan     1. Numerous moles, benign-appearing  - educ re ABCD's of MM   educ sun protection   encouraged skin check yearly     2. Hx of AK's, clear today  - sun protection and FSE yearly    3. Scalp psoriasis, stable/mild activity today  - cont topical steroids prn flares; ed se/misuse  - cont Enbrel per rheum    4. R/o irritated nevus vs dysplastic - L upper anterior thigh  R/o ISK vs irritated nevus - L upper chest  - 2 Shave biopsy performed after verbal consent obtained. Patient educated regarding risk of bleeding, infection, scar and educated on wound care. Skin cleansed with alcohol pad and site anesthetized with lido + epi. Aluminum chloride applied to site for hemostasis. Petrolatum ointment and bandage applied. Specimen bottle labeled with patient information and site and specimen sent to dermpath. S/p IPL + GL for lentigines on the arms at last visit - tolerated well and improved but has a few faint \"stripes\" remaining on the R arm.   Already has next trx scheduled for Feb.

## 2017-12-19 ENCOUNTER — TELEPHONE (OUTPATIENT)
Dept: DERMATOLOGY | Age: 57
End: 2017-12-19

## 2017-12-19 NOTE — TELEPHONE ENCOUNTER
L/vm for patient to return call regarding bx results of:    Lt upper anterior thigh-benign nevus. Lt upper chest-squamous acanthoma, benign.

## 2017-12-22 DIAGNOSIS — F41.9 ANXIETY: ICD-10-CM

## 2017-12-22 RX ORDER — CLONAZEPAM 0.5 MG/1
0.5 TABLET ORAL 2 TIMES DAILY PRN
Qty: 90 TABLET | Refills: 0 | Status: SHIPPED | OUTPATIENT
Start: 2017-12-22 | End: 2018-03-07 | Stop reason: SDUPTHER

## 2018-01-04 ENCOUNTER — PROCEDURE VISIT (OUTPATIENT)
Dept: DERMATOLOGY | Age: 58
End: 2018-01-04

## 2018-01-04 DIAGNOSIS — Z41.1 ELECTIVE PROCEDURE FOR UNACCEPTABLE COSMETIC APPEARANCE: ICD-10-CM

## 2018-01-04 DIAGNOSIS — L81.9 HYPERPIGMENTATION: Primary | ICD-10-CM

## 2018-01-04 PROCEDURE — DM01520 PIGMENTED LASER SMALL AREA (EG. HANDS): Performed by: DERMATOLOGY

## 2018-01-15 DIAGNOSIS — K59.04 CHRONIC IDIOPATHIC CONSTIPATION: ICD-10-CM

## 2018-01-15 RX ORDER — LUBIPROSTONE 24 UG/1
24 CAPSULE, GELATIN COATED ORAL 2 TIMES DAILY WITH MEALS
Qty: 180 CAPSULE | Refills: 1 | Status: SHIPPED | OUTPATIENT
Start: 2018-01-15 | End: 2018-03-23 | Stop reason: SDUPTHER

## 2018-01-25 ENCOUNTER — OFFICE VISIT (OUTPATIENT)
Dept: RHEUMATOLOGY | Age: 58
End: 2018-01-25

## 2018-01-25 VITALS
TEMPERATURE: 98.4 F | BODY MASS INDEX: 26.33 KG/M2 | HEIGHT: 65 IN | DIASTOLIC BLOOD PRESSURE: 82 MMHG | SYSTOLIC BLOOD PRESSURE: 120 MMHG | WEIGHT: 158 LBS

## 2018-01-25 DIAGNOSIS — L40.9 PSORIASIS: ICD-10-CM

## 2018-01-25 DIAGNOSIS — L40.50 PSORIATIC ARTHRITIS (HCC): Primary | ICD-10-CM

## 2018-01-25 DIAGNOSIS — Z79.899 HIGH RISK MEDICATION USE: ICD-10-CM

## 2018-01-25 PROCEDURE — 99214 OFFICE O/P EST MOD 30 MIN: CPT | Performed by: INTERNAL MEDICINE

## 2018-01-25 NOTE — PROGRESS NOTES
otherwise. All other review of systems are negative. Blood work reviewed, within normal limits. Past Medical History:   Diagnosis Date    Actinic keratosis 2010    Allergic rhinitis     Anxiety 2010    Cancer ([facility] [address]     ovarian    Chronic idiopathic constipation 2017    Hemifacial spasm     Botox    Hyperlipidemia     Osteoarthritis     Psoriasis     Psoriatic arthritis ([facility] [address]      Past Surgical History:   Procedure Laterality Date    BRAIN SURGERY      for blood vessel abnormality    BREAST REDUCTION SURGERY       SECTION      COLONOSCOPY      FINGER SURGERY  2007    Left index finger     HYSTERECTOMY      INNER EAR SURGERY      repair artery right ear    NEUROMA SURGERY      microvascular reconstruction to fix hemifacial spasm    RHINOPLASTY      SHOULDER SURGERY      left shoulder    AMRITA AND BSO      TOE SURGERY      TONSILLECTOMY      VARICOSE VEIN SURGERY      WRIST SURGERY      right wrist     Person, family history reviewed and updated. Current Outpatient Prescriptions   Medication Sig Dispense Refill    lubiprostone (AMITIZA) 24 MCG capsule Take 1 capsule by mouth 2 times daily (with meals) 180 capsule 1    clonazePAM (KLONOPIN) 0.5 MG tablet Take 1 tablet by mouth 2 times daily as needed for Anxiety .  90 tablet 0    Estradiol (VAGIFEM) 10 MCG TABS vaginal tablet       lisinopril (PRINIVIL;ZESTRIL) 20 MG tablet Take 1 tablet by mouth daily 90 tablet 1    ENBREL SURECLICK 50 MG/ML SOAJ INJECT 50 MG (1 PEN) UNDER THE SKIN EVERY 7 DAYS 3.92 mL 2    meloxicam (MOBIC) 15 MG tablet TAKE 1 TABLET DAILY 90 tablet 1    CLOBEX SPRAY 0.05 % LIQD APPLY TO SCALP DAILY TO TWICE A DAY AS NEEDED FOR FLARES 250 mL 3    atorvastatin (LIPITOR) 20 MG tablet Take 1 tablet by mouth daily 90 tablet 1    hydrochlorothiazide (HYDRODIURIL) 12.5 MG tablet Take 1 tablet by mouth daily 90 tablet 1    tretinoin (RETIN-A) 0.05 % cream Apply a pea sized amount to the face QHS 45 g 3    fluticasone (FLONASE) 50 MCG/ACT nasal spray 1 spray by Nasal route daily      Ascorbic Acid (VITAMIN C) 500 MG tablet Take 1,000 mg by mouth daily.  Cholecalciferol (VITAMIN D) 1000 UNIT CAPS Take 1 capsule by mouth daily.  cetirizine (ZYRTEC) 10 MG tablet Take 1 tablet by mouth daily. 90 tablet 1    Flaxseed, Linseed, (FLAXSEED OIL) 1000 MG CAPS Take  by mouth.  Green Tea 250 MG CAPS Take  by mouth daily.  Calcium-Magnesium 426-246 MG TABS Take  by mouth daily.  PROBIOTIC CAPS Take  by mouth daily. No current facility-administered medications for this visit. Allergies   Allergen Reactions    Sulfa Antibiotics Rash    Codeine          OBJECTIVE  Physical    Vitals:    01/25/18 0809   BP: 120/82   Temp: 98.4 °F (36.9 °C)       General appearance/psychiatric: Well nourished and well groomed, normal judgment. Alert, appears stated age and cooperative. MKS: R wrist - surgery for arthritic deformity. Toes- 2,3 - corrective toe surgery    28 joint JOINT COUNT:28 joint count 0                               Right                                                  Left   Swell Tender Swell Tender   PIP1           PIP2           PIP3           PIP4          PIP5          MCP1           MCP2           MCP3           MCP4           MCP5           Wrist           Elbow           Shoulder          Knee             I do not appreciate any tender, swollen or inflamed joints in upper and lower extremities. Bony crepitus of knees without any swelling or tenderness or effusion. Full range of motion in all peripheral joints. Gait- Normal   Normal spine exam.  Skin: no rashes. Denies any skin thickening or digital ischemia. HEENT: Normal lids/conjunctiva and pupils. No oral or nasal ulcers. Salivary glands reveals no evidence of abnormality. Neurologic: normal deep tendon reflexes. No foot or wrist drop.       Data:  Lab Results   Component Value Date    WBC 6.4

## 2018-02-01 DIAGNOSIS — I10 ESSENTIAL HYPERTENSION: ICD-10-CM

## 2018-02-01 RX ORDER — HYDROCHLOROTHIAZIDE 12.5 MG/1
12.5 TABLET ORAL DAILY
Qty: 90 TABLET | Refills: 1 | Status: SHIPPED | OUTPATIENT
Start: 2018-02-01 | End: 2018-07-31 | Stop reason: SDUPTHER

## 2018-02-14 ENCOUNTER — OFFICE VISIT (OUTPATIENT)
Dept: FAMILY MEDICINE CLINIC | Age: 58
End: 2018-02-14

## 2018-02-14 ENCOUNTER — PATIENT MESSAGE (OUTPATIENT)
Dept: FAMILY MEDICINE CLINIC | Age: 58
End: 2018-02-14

## 2018-02-14 VITALS
OXYGEN SATURATION: 98 % | HEART RATE: 83 BPM | SYSTOLIC BLOOD PRESSURE: 110 MMHG | WEIGHT: 153.4 LBS | BODY MASS INDEX: 25.56 KG/M2 | DIASTOLIC BLOOD PRESSURE: 60 MMHG | HEIGHT: 65 IN | TEMPERATURE: 98.6 F

## 2018-02-14 DIAGNOSIS — J06.9 URI, ACUTE: Primary | ICD-10-CM

## 2018-02-14 RX ORDER — BROMPHENIRAMINE MALEATE, PSEUDOEPHEDRINE HYDROCHLORIDE, AND DEXTROMETHORPHAN HYDROBROMIDE 2; 30; 10 MG/5ML; MG/5ML; MG/5ML
5 SYRUP ORAL 4 TIMES DAILY PRN
Qty: 200 ML | Refills: 0 | Status: SHIPPED | OUTPATIENT
Start: 2018-02-14 | End: 2018-05-18 | Stop reason: ALTCHOICE

## 2018-02-14 RX ORDER — AMOXICILLIN AND CLAVULANATE POTASSIUM 875; 125 MG/1; MG/1
TABLET, FILM COATED ORAL
Refills: 0 | COMMUNITY
Start: 2018-02-06 | End: 2018-02-14 | Stop reason: ALTCHOICE

## 2018-02-14 RX ORDER — LEVOFLOXACIN 500 MG/1
500 TABLET, FILM COATED ORAL DAILY
Qty: 10 TABLET | Refills: 0 | Status: SHIPPED | OUTPATIENT
Start: 2018-02-14 | End: 2018-02-24

## 2018-02-15 RX ORDER — FLUCONAZOLE 150 MG/1
150 TABLET ORAL ONCE
Qty: 2 TABLET | Refills: 0 | Status: SHIPPED | OUTPATIENT
Start: 2018-02-15 | End: 2018-02-15

## 2018-02-15 ASSESSMENT — ENCOUNTER SYMPTOMS
NAUSEA: 0
SWOLLEN GLANDS: 0
SINUS PRESSURE: 1
RHINORRHEA: 1
COUGH: 1
WHEEZING: 0
SORE THROAT: 0
GASTROINTESTINAL NEGATIVE: 1
DIARRHEA: 0
ABDOMINAL PAIN: 0
ALLERGIC/IMMUNOLOGIC NEGATIVE: 1
EYES NEGATIVE: 1
VOMITING: 0
SINUS PAIN: 1

## 2018-02-15 NOTE — TELEPHONE ENCOUNTER
From: Toby Brantley  To: Saundra Ashton NP  Sent: 2/14/2018 6:58 PM EST  Subject: Prescription Question    Neda:    I didn't get the diflucan prescription called in for me. Can you call it in tomorrow?     Thanks,  oJaquin Bergman

## 2018-02-23 ENCOUNTER — PATIENT MESSAGE (OUTPATIENT)
Dept: FAMILY MEDICINE CLINIC | Age: 58
End: 2018-02-23

## 2018-02-23 RX ORDER — FLUCONAZOLE 150 MG/1
150 TABLET ORAL ONCE
Qty: 2 TABLET | Refills: 0 | Status: SHIPPED | OUTPATIENT
Start: 2018-02-23 | End: 2018-02-23

## 2018-02-24 NOTE — PROGRESS NOTES
I have reviewed the above note.  I agree with the assessment and treatment plan
nasal spray 1 spray by Nasal route daily      Ascorbic Acid (VITAMIN C) 500 MG tablet Take 1,000 mg by mouth daily.  Cholecalciferol (VITAMIN D) 1000 UNIT CAPS Take 1 capsule by mouth daily.  cetirizine (ZYRTEC) 10 MG tablet Take 1 tablet by mouth daily. 90 tablet 1    Flaxseed, Linseed, (FLAXSEED OIL) 1000 MG CAPS Take  by mouth.  Green Tea 250 MG CAPS Take  by mouth daily.  PROBIOTIC CAPS Take  by mouth daily. Past Medical History:   Diagnosis Date    Actinic keratosis 2010    Allergic rhinitis     Anxiety 2010    Cancer ([facility] [address]     ovarian    Chronic idiopathic constipation 2017    Hemifacial spasm     Botox    Hyperlipidemia     Osteoarthritis     Psoriasis     Psoriatic arthritis ([facility] [address]      Past Surgical History:   Procedure Laterality Date    BRAIN SURGERY      for blood vessel abnormality    BREAST REDUCTION SURGERY       SECTION      COLONOSCOPY  12    FINGER SURGERY  2007    Left index finger     HYSTERECTOMY      INNER EAR SURGERY  2008    repair artery right ear    NEUROMA SURGERY      microvascular reconstruction to fix hemifacial spasm    RHINOPLASTY      SHOULDER SURGERY      left shoulder    AMRITA AND BSO      TOE SURGERY      TONSILLECTOMY      VARICOSE VEIN SURGERY      WRIST SURGERY      right wrist     Family History   Problem Relation Age of Onset    Heart Disease Father      chf    High Blood Pressure Father     High Cholesterol Father     Diabetes Father     Kidney Disease Father     High Blood Pressure Brother     Depression Brother     Diabetes Brother     Cancer Paternal Grandmother     Stroke Paternal Grandmother     Stroke Paternal Grandfather     Cancer Mother      skin    Cancer Sister      breast     Social History     Social History    Marital status:      Spouse name: N/A    Number of children: N/A    Years of education: N/A     Occupational History    Not on file.      Social

## 2018-03-01 ENCOUNTER — PROCEDURE VISIT (OUTPATIENT)
Dept: DERMATOLOGY | Age: 58
End: 2018-03-01

## 2018-03-01 DIAGNOSIS — L81.4 LENTIGINES: Primary | ICD-10-CM

## 2018-03-01 DIAGNOSIS — L82.1 SEBORRHEIC KERATOSIS: ICD-10-CM

## 2018-03-01 DIAGNOSIS — Z41.1 ELECTIVE PROCEDURE FOR UNACCEPTABLE COSMETIC APPEARANCE: ICD-10-CM

## 2018-03-01 PROCEDURE — DM01510 PIGMENTED LASER 6-10 LESIONS: Performed by: DERMATOLOGY

## 2018-03-02 DIAGNOSIS — E78.2 MIXED HYPERLIPIDEMIA: ICD-10-CM

## 2018-03-02 RX ORDER — ATORVASTATIN CALCIUM 20 MG/1
20 TABLET, FILM COATED ORAL DAILY
Qty: 90 TABLET | Refills: 1 | Status: SHIPPED | OUTPATIENT
Start: 2018-03-02 | End: 2019-05-14 | Stop reason: SDUPTHER

## 2018-03-07 ENCOUNTER — TELEPHONE (OUTPATIENT)
Dept: FAMILY MEDICINE CLINIC | Age: 58
End: 2018-03-07

## 2018-03-07 DIAGNOSIS — F41.9 ANXIETY: ICD-10-CM

## 2018-03-07 RX ORDER — CLONAZEPAM 0.5 MG/1
0.5 TABLET ORAL 2 TIMES DAILY PRN
Qty: 90 TABLET | Refills: 0 | Status: SHIPPED | OUTPATIENT
Start: 2018-03-07 | End: 2018-04-09 | Stop reason: SDUPTHER

## 2018-03-22 DIAGNOSIS — F41.9 ANXIETY: ICD-10-CM

## 2018-03-22 RX ORDER — CLONAZEPAM 0.5 MG/1
0.5 TABLET ORAL 2 TIMES DAILY PRN
Qty: 90 TABLET | Refills: 0 | Status: CANCELLED | OUTPATIENT
Start: 2018-03-22 | End: 2018-06-05

## 2018-03-23 DIAGNOSIS — F41.9 ANXIETY: ICD-10-CM

## 2018-03-23 RX ORDER — CETIRIZINE HYDROCHLORIDE 10 MG/1
10 TABLET ORAL
COMMUNITY
Start: 2010-03-01 | End: 2020-04-17 | Stop reason: ALTCHOICE

## 2018-03-23 RX ORDER — LUBIPROSTONE 24 UG/1
24 CAPSULE, GELATIN COATED ORAL
COMMUNITY
Start: 2007-09-17 | End: 2019-08-15 | Stop reason: ALTCHOICE

## 2018-03-23 RX ORDER — MELOXICAM 15 MG/1
15 TABLET ORAL
COMMUNITY
Start: 2010-03-01 | End: 2018-04-20 | Stop reason: SDUPTHER

## 2018-03-23 RX ORDER — CLONAZEPAM 0.5 MG/1
0.5 TABLET ORAL 2 TIMES DAILY PRN
Qty: 90 TABLET | Refills: 0 | OUTPATIENT
Start: 2018-03-23 | End: 2018-06-06

## 2018-03-23 RX ORDER — ESTRADIOL 10 UG/1
INSERT VAGINAL
COMMUNITY
Start: 2018-02-22 | End: 2018-07-31 | Stop reason: SDUPTHER

## 2018-03-23 RX ORDER — METHYLPREDNISOLONE 4 MG/1
TABLET ORAL
Refills: 0 | COMMUNITY
Start: 2018-02-06 | End: 2018-04-02

## 2018-03-23 NOTE — TELEPHONE ENCOUNTER
1 order pending  Requesting 90 day supply thru mail order  [facility] Sick Visit on 2/14/18  Last Fill 3/7/18 at local pharmacy

## 2018-04-02 ENCOUNTER — OFFICE VISIT (OUTPATIENT)
Dept: FAMILY MEDICINE CLINIC | Age: 58
End: 2018-04-02

## 2018-04-02 VITALS
BODY MASS INDEX: 24.79 KG/M2 | SYSTOLIC BLOOD PRESSURE: 120 MMHG | DIASTOLIC BLOOD PRESSURE: 82 MMHG | TEMPERATURE: 98.5 F | HEIGHT: 65 IN | WEIGHT: 148.8 LBS | OXYGEN SATURATION: 100 % | HEART RATE: 86 BPM

## 2018-04-02 DIAGNOSIS — J02.9 PHARYNGITIS, UNSPECIFIED ETIOLOGY: ICD-10-CM

## 2018-04-02 DIAGNOSIS — J06.9 URI, ACUTE: Primary | ICD-10-CM

## 2018-04-02 LAB
INFLUENZA A ANTIGEN, POC: NORMAL
INFLUENZA B ANTIGEN, POC: NORMAL
S PYO AG THROAT QL: NORMAL

## 2018-04-02 RX ORDER — AMOXICILLIN AND CLAVULANATE POTASSIUM 875; 125 MG/1; MG/1
1 TABLET, FILM COATED ORAL 2 TIMES DAILY
Qty: 20 TABLET | Refills: 0 | Status: SHIPPED | OUTPATIENT
Start: 2018-04-02 | End: 2018-04-12

## 2018-04-02 RX ORDER — FLUCONAZOLE 150 MG/1
TABLET ORAL
Qty: 2 TABLET | Refills: 0 | Status: SHIPPED | OUTPATIENT
Start: 2018-04-02 | End: 2018-04-03

## 2018-04-02 ASSESSMENT — ENCOUNTER SYMPTOMS
WHEEZING: 0
SORE THROAT: 1
COUGH: 1
RHINORRHEA: 0
SWOLLEN GLANDS: 0
GASTROINTESTINAL NEGATIVE: 1
SINUS PAIN: 1
ABDOMINAL PAIN: 0
EYES NEGATIVE: 1
DIARRHEA: 0
NAUSEA: 0
VOMITING: 0
SINUS PRESSURE: 1
ALLERGIC/IMMUNOLOGIC NEGATIVE: 1

## 2018-04-04 LAB — THROAT CULTURE: NORMAL

## 2018-04-05 ENCOUNTER — TELEPHONE (OUTPATIENT)
Dept: FAMILY MEDICINE CLINIC | Age: 58
End: 2018-04-05

## 2018-04-05 RX ORDER — LEVOFLOXACIN 500 MG/1
500 TABLET, FILM COATED ORAL DAILY
Qty: 10 TABLET | Refills: 0 | Status: SHIPPED | OUTPATIENT
Start: 2018-04-05 | End: 2018-04-15

## 2018-04-09 DIAGNOSIS — F41.9 ANXIETY: ICD-10-CM

## 2018-04-09 RX ORDER — CLONAZEPAM 0.5 MG/1
0.5 TABLET ORAL 2 TIMES DAILY PRN
Qty: 90 TABLET | Refills: 0 | Status: SHIPPED | OUTPATIENT
Start: 2018-04-09 | End: 2018-07-05 | Stop reason: SDUPTHER

## 2018-04-20 RX ORDER — MELOXICAM 15 MG/1
15 TABLET ORAL DAILY
Qty: 90 TABLET | Refills: 1 | Status: SHIPPED | OUTPATIENT
Start: 2018-04-20 | End: 2018-10-17 | Stop reason: SDUPTHER

## 2018-05-06 DIAGNOSIS — I10 ESSENTIAL HYPERTENSION: ICD-10-CM

## 2018-05-06 RX ORDER — LISINOPRIL 20 MG/1
20 TABLET ORAL DAILY
Qty: 90 TABLET | Refills: 1 | Status: SHIPPED | OUTPATIENT
Start: 2018-05-06 | End: 2019-05-07 | Stop reason: SDUPTHER

## 2018-05-18 ENCOUNTER — OFFICE VISIT (OUTPATIENT)
Dept: FAMILY MEDICINE CLINIC | Age: 58
End: 2018-05-18

## 2018-05-18 VITALS
WEIGHT: 148.2 LBS | DIASTOLIC BLOOD PRESSURE: 86 MMHG | BODY MASS INDEX: 24.69 KG/M2 | OXYGEN SATURATION: 99 % | TEMPERATURE: 98.4 F | HEIGHT: 65 IN | SYSTOLIC BLOOD PRESSURE: 122 MMHG | RESPIRATION RATE: 16 BRPM | HEART RATE: 95 BPM

## 2018-05-18 DIAGNOSIS — M81.0 POSTMENOPAUSAL OSTEOPOROSIS: ICD-10-CM

## 2018-05-18 DIAGNOSIS — Z00.00 ANNUAL PHYSICAL EXAM: Primary | ICD-10-CM

## 2018-05-18 LAB
BILIRUBIN, POC: NEGATIVE
BLOOD URINE, POC: NEGATIVE
CLARITY, POC: CLEAR
COLOR, POC: YELLOW
GLUCOSE URINE, POC: NEGATIVE
KETONES, POC: NEGATIVE
LEUKOCYTE EST, POC: NEGATIVE
NITRITE, POC: NEGATIVE
PH, POC: 6
PROTEIN, POC: NEGATIVE
SPECIFIC GRAVITY, POC: 1.03
UROBILINOGEN, POC: 0.2

## 2018-06-04 ENCOUNTER — OFFICE VISIT (OUTPATIENT)
Dept: RHEUMATOLOGY | Age: 58
End: 2018-06-04

## 2018-06-04 VITALS
DIASTOLIC BLOOD PRESSURE: 84 MMHG | HEART RATE: 83 BPM | WEIGHT: 151 LBS | TEMPERATURE: 98.5 F | HEIGHT: 65 IN | BODY MASS INDEX: 25.16 KG/M2 | SYSTOLIC BLOOD PRESSURE: 118 MMHG

## 2018-06-04 DIAGNOSIS — L40.9 PSORIASIS: ICD-10-CM

## 2018-06-04 DIAGNOSIS — Z00.00 ANNUAL PHYSICAL EXAM: ICD-10-CM

## 2018-06-04 DIAGNOSIS — Z79.899 HIGH RISK MEDICATION USE: ICD-10-CM

## 2018-06-04 DIAGNOSIS — L40.50 PSORIATIC ARTHRITIS (HCC): Primary | ICD-10-CM

## 2018-06-04 LAB
A/G RATIO: 2.1 (ref 1.1–2.2)
ALBUMIN SERPL-MCNC: 4.8 G/DL (ref 3.4–5)
ALP BLD-CCNC: 86 U/L (ref 40–129)
ALT SERPL-CCNC: 21 U/L (ref 10–40)
ANION GAP SERPL CALCULATED.3IONS-SCNC: 11 MMOL/L (ref 3–16)
AST SERPL-CCNC: 20 U/L (ref 15–37)
BASOPHILS ABSOLUTE: 0.1 K/UL (ref 0–0.2)
BASOPHILS RELATIVE PERCENT: 1.4 %
BILIRUB SERPL-MCNC: 0.4 MG/DL (ref 0–1)
BUN BLDV-MCNC: 22 MG/DL (ref 7–20)
CALCIUM SERPL-MCNC: 10 MG/DL (ref 8.3–10.6)
CHLORIDE BLD-SCNC: 105 MMOL/L (ref 99–110)
CHOLESTEROL, TOTAL: 179 MG/DL (ref 0–199)
CO2: 28 MMOL/L (ref 21–32)
CREAT SERPL-MCNC: 0.7 MG/DL (ref 0.6–1.1)
EOSINOPHILS ABSOLUTE: 0.2 K/UL (ref 0–0.6)
EOSINOPHILS RELATIVE PERCENT: 3.8 %
GFR AFRICAN AMERICAN: >60
GFR NON-AFRICAN AMERICAN: >60
GLOBULIN: 2.3 G/DL
GLUCOSE BLD-MCNC: 94 MG/DL (ref 70–99)
HCT VFR BLD CALC: 42.2 % (ref 36–48)
HDLC SERPL-MCNC: 53 MG/DL (ref 40–60)
HEMOGLOBIN: 14 G/DL (ref 12–16)
LDL CHOLESTEROL CALCULATED: 97 MG/DL
LYMPHOCYTES ABSOLUTE: 1.9 K/UL (ref 1–5.1)
LYMPHOCYTES RELATIVE PERCENT: 36.2 %
MCH RBC QN AUTO: 27.3 PG (ref 26–34)
MCHC RBC AUTO-ENTMCNC: 33.2 G/DL (ref 31–36)
MCV RBC AUTO: 82.1 FL (ref 80–100)
MONOCYTES ABSOLUTE: 0.5 K/UL (ref 0–1.3)
MONOCYTES RELATIVE PERCENT: 8.7 %
NEUTROPHILS ABSOLUTE: 2.7 K/UL (ref 1.7–7.7)
NEUTROPHILS RELATIVE PERCENT: 49.9 %
PDW BLD-RTO: 13.7 % (ref 12.4–15.4)
PLATELET # BLD: 300 K/UL (ref 135–450)
PMV BLD AUTO: 8.6 FL (ref 5–10.5)
POTASSIUM SERPL-SCNC: 4.6 MMOL/L (ref 3.5–5.1)
RBC # BLD: 5.14 M/UL (ref 4–5.2)
SODIUM BLD-SCNC: 144 MMOL/L (ref 136–145)
T4 FREE: 1.2 NG/DL (ref 0.9–1.8)
TOTAL PROTEIN: 7.1 G/DL (ref 6.4–8.2)
TRIGL SERPL-MCNC: 145 MG/DL (ref 0–150)
TSH SERPL DL<=0.05 MIU/L-ACNC: 4.83 UIU/ML (ref 0.27–4.2)
VITAMIN D 25-HYDROXY: 70.7 NG/ML
VLDLC SERPL CALC-MCNC: 29 MG/DL
WBC # BLD: 5.4 K/UL (ref 4–11)

## 2018-06-04 PROCEDURE — 99214 OFFICE O/P EST MOD 30 MIN: CPT | Performed by: INTERNAL MEDICINE

## 2018-06-18 ENCOUNTER — TELEPHONE (OUTPATIENT)
Dept: DERMATOLOGY | Age: 58
End: 2018-06-18

## 2018-06-22 ENCOUNTER — HOSPITAL ENCOUNTER (OUTPATIENT)
Dept: GENERAL RADIOLOGY | Age: 58
Discharge: HOME | End: 2018-06-22
Attending: NURSE PRACTITIONER | Admitting: NURSE PRACTITIONER

## 2018-06-22 DIAGNOSIS — Z13.820 ENCOUNTER FOR IMAGING TO ASSESS OSTEOPOROSIS: ICD-10-CM

## 2018-06-22 DIAGNOSIS — M81.0 AGE-RELATED OSTEOPOROSIS WITHOUT CURRENT PATHOLOGICAL FRACTURE: ICD-10-CM

## 2018-06-25 ENCOUNTER — OFFICE VISIT (OUTPATIENT)
Dept: FAMILY MEDICINE CLINIC | Age: 58
End: 2018-06-25

## 2018-06-25 VITALS
DIASTOLIC BLOOD PRESSURE: 60 MMHG | SYSTOLIC BLOOD PRESSURE: 100 MMHG | OXYGEN SATURATION: 99 % | HEART RATE: 97 BPM | TEMPERATURE: 99 F | WEIGHT: 152.8 LBS | HEIGHT: 65 IN | BODY MASS INDEX: 25.46 KG/M2

## 2018-06-25 DIAGNOSIS — L02.93 CARBUNCLE: Primary | ICD-10-CM

## 2018-06-25 RX ORDER — FLUCONAZOLE 150 MG/1
TABLET ORAL
Qty: 2 TABLET | Refills: 0 | Status: SHIPPED | OUTPATIENT
Start: 2018-06-25 | End: 2018-06-26

## 2018-06-25 RX ORDER — CLINDAMYCIN HYDROCHLORIDE 300 MG/1
300 CAPSULE ORAL 3 TIMES DAILY
Qty: 30 CAPSULE | Refills: 0 | Status: SHIPPED | OUTPATIENT
Start: 2018-06-25 | End: 2018-07-05

## 2018-06-25 ASSESSMENT — ENCOUNTER SYMPTOMS
ROS SKIN COMMENTS: CYST ON BACK
GASTROINTESTINAL NEGATIVE: 1
ALLERGIC/IMMUNOLOGIC NEGATIVE: 1
EYES NEGATIVE: 1
RESPIRATORY NEGATIVE: 1

## 2018-06-27 ENCOUNTER — TELEPHONE (OUTPATIENT)
Dept: FAMILY MEDICINE CLINIC | Age: 58
End: 2018-06-27

## 2018-06-27 ENCOUNTER — TELEPHONE (OUTPATIENT)
Dept: DERMATOLOGY | Age: 58
End: 2018-06-27

## 2018-06-28 LAB
GRAM STAIN RESULT: ABNORMAL
ORGANISM: ABNORMAL
WOUND/ABSCESS: ABNORMAL
WOUND/ABSCESS: ABNORMAL

## 2018-06-29 ENCOUNTER — TELEPHONE (OUTPATIENT)
Dept: DERMATOLOGY | Age: 58
End: 2018-06-29

## 2018-06-29 NOTE — TELEPHONE ENCOUNTER
Pt c/b [phone]  Pt states:   - she tested positive for staph   - would like to be seen next wk on Thursday  Please call to discuss thanks

## 2018-06-29 NOTE — TELEPHONE ENCOUNTER
Spoke to patient-she would like Dr. Jaen Montoya to check the cyst on her back.  Advised patient that we will not removed the area on Thursday-we can schedule a few weeks out, so the area can heal.     Ok to schedule on 7/5/2018-per patient's request?

## 2018-07-02 NOTE — TELEPHONE ENCOUNTER
Pt calling Roxi Zamorano back regarding appt time for 7/5 pls return pt call back @ [phone] thank you

## 2018-07-05 ENCOUNTER — OFFICE VISIT (OUTPATIENT)
Dept: DERMATOLOGY | Age: 58
End: 2018-07-05

## 2018-07-05 DIAGNOSIS — F41.9 ANXIETY: ICD-10-CM

## 2018-07-05 DIAGNOSIS — L72.3 INFLAMED EPIDERMOID CYST OF SKIN: Primary | ICD-10-CM

## 2018-07-05 PROCEDURE — 99212 OFFICE O/P EST SF 10 MIN: CPT | Performed by: DERMATOLOGY

## 2018-07-05 RX ORDER — CLONAZEPAM 0.5 MG/1
0.5 TABLET ORAL 2 TIMES DAILY PRN
Qty: 90 TABLET | Refills: 0 | Status: SHIPPED | OUTPATIENT
Start: 2018-07-05 | End: 2019-05-07 | Stop reason: SDUPTHER

## 2018-07-05 NOTE — PROGRESS NOTES
[facility] Dermatology  Skip Sterling MD  [phone]      Pasqual Essex  1960    56 y.o. female     Date of Visit: 2018    Last seen: 3-2018    Chief Complaint: cyst - back  Chief Complaint   Patient presents with    Wound Check     cyst on back     History of Present Illness:    Here for a ruptured and ? Infected cyst on the back that flared about 2 weeks ago - saw her PCP b/c I was not in the office and they attempted to I/D but got worse - more pain, inflamed and enlarging + feeling badly overall so she went to the ED and had I/D with packing and started on clinda po. Was changed to doxy 5 days ago d/t culture grew staph lugdunensis. Overall feeling much better - has tiny amount of drainage and no pain. Has another cystic nodule on the L upper back - wouls like to consider removal as well given above. Review of Systems:  Gen: Feels well, good sense of health. No F/C. Skin: No changing moles or lesions. Past Medical History, Family History, Surgical History, Medications and Allergies reviewed.     Outpatient Prescriptions Marked as Taking for the 18 encounter (Office Visit) with Liborio Baca MD   Medication Sig Dispense Refill    [] clindamycin (CLEOCIN) 300 MG capsule Take 1 capsule by mouth 3 times daily for 10 days 30 capsule 0    linaclotide (LINZESS) 145 MCG capsule Take 1 capsule by mouth every morning (before breakfast) 90 capsule 1    lisinopril (PRINIVIL;ZESTRIL) 20 MG tablet Take 1 tablet by mouth daily 90 tablet 1    meloxicam (MOBIC) 15 MG tablet Take 1 tablet by mouth daily 90 tablet 1    CLOBEX SPRAY 0.05 % LIQD APPLY TO SCALP DAILY TO TWICE A DAY AS NEEDED FOR FLARES 250 mL 3    cetirizine (ZYRTEC) 10 MG tablet Take 10 mg by mouth      Estradiol (YUVAFEM) 10 MCG TABS vaginal tablet INSERT 1 TABLET VAGINALLY TWICE WEEKLY      lubiprostone (AMITIZA) 24 MCG capsule Take 24 mcg by mouth      atorvastatin (LIPITOR) 20 MG tablet Take 1 tablet by mouth daily 90 tablet 1    hydrochlorothiazide (HYDRODIURIL) 12.5 MG tablet Take 1 tablet by mouth daily 90 tablet 1    ENBREL SURECLICK 50 MG/ML SOAJ INJECT 50 MG (1 PEN) UNDER THE SKIN EVERY 7 DAYS 3.92 mL 5    tretinoin (RETIN-A) 0.05 % cream Apply a pea sized amount to the face QHS 45 g 3    fluticasone (FLONASE) 50 MCG/ACT nasal spray 1 spray by Nasal route daily      Ascorbic Acid (VITAMIN C) 500 MG tablet Take 1,000 mg by mouth daily.  Cholecalciferol (VITAMIN D) 1000 UNIT CAPS Take 1 capsule by mouth daily.  Flaxseed, Linseed, (FLAXSEED OIL) 1000 MG CAPS Take  by mouth.  Green Tea 250 MG CAPS Take  by mouth daily.  PROBIOTIC CAPS Take  by mouth daily. Allergies   Allergen Reactions    Sulfa Antibiotics Rash    Codeine        Past Medical History:   Diagnosis Date    Actinic keratosis 2010    Allergic rhinitis     Anxiety 2010    Cancer ([facility] [address]     ovarian    Chronic idiopathic constipation 2017    Hemifacial spasm     Botox    Hyperlipidemia     Osteoarthritis     Psoriasis     Psoriatic arthritis ([facility] [address]      Past Surgical History:   Procedure Laterality Date    BRAIN SURGERY      for blood vessel abnormality    BREAST REDUCTION SURGERY       SECTION      COLONOSCOPY      FINGER SURGERY  2007    Left index finger     HYSTERECTOMY      INNER EAR SURGERY  2008    repair artery right ear    NEUROMA SURGERY      microvascular reconstruction to fix hemifacial spasm    RHINOPLASTY      SHOULDER SURGERY      left shoulder    AMRITA AND BSO      TOE SURGERY      TONSILLECTOMY      VARICOSE VEIN SURGERY      WRIST SURGERY      right wrist       Physical Examination     Gen, well-appearing  The following were examined and determined to be normal: Psych/Neuro. The following were examined and determined to be abnormal: Back.    Central upper back with mildly erythematous patch with central pinpoint punctum and ill-defined small

## 2018-07-18 RX ORDER — ETANERCEPT 50 MG/ML
SOLUTION SUBCUTANEOUS
Qty: 3.92 ML | Refills: 5 | Status: SHIPPED | OUTPATIENT
Start: 2018-07-18 | End: 2019-01-11 | Stop reason: SDUPTHER

## 2018-08-20 NOTE — TELEPHONE ENCOUNTER
From: Magaly Boo  Sent: 8/17/2018 3:12 PM EDT  Subject: Medication Renewal Request    Adam Singh. Yen Fair would like a refill of the following medications:     CLOBEX SPRAY 0.05 % LIQD [Makeda Espinal MD]   Patient Comment: Can you please call in a refill to [facility]?     Preferred pharmacy: [address] [phone] - F [phone]

## 2018-08-22 ENCOUNTER — TELEPHONE (OUTPATIENT)
Dept: DERMATOLOGY | Age: 58
End: 2018-08-22

## 2018-08-24 RX ORDER — CLOBETASOL PROPIONATE 0.46 MG/ML
SOLUTION TOPICAL
Qty: 50 ML | Refills: 5 | Status: SHIPPED | OUTPATIENT
Start: 2018-08-24 | End: 2018-12-03 | Stop reason: SDUPTHER

## 2018-08-24 NOTE — TELEPHONE ENCOUNTER
Please let her know that it isn't covered. If she is OK with the clobetasol solution, I'll send it in instead.

## 2018-10-11 ENCOUNTER — TELEPHONE (OUTPATIENT)
Dept: DERMATOLOGY | Age: 58
End: 2018-10-11

## 2018-10-11 ENCOUNTER — OFFICE VISIT (OUTPATIENT)
Dept: DERMATOLOGY | Age: 58
End: 2018-10-11
Payer: COMMERCIAL

## 2018-10-11 DIAGNOSIS — L72.3 INFLAMED EPIDERMOID CYST OF SKIN: ICD-10-CM

## 2018-10-11 DIAGNOSIS — L40.9 PSORIASIS: ICD-10-CM

## 2018-10-11 DIAGNOSIS — D22.9 MULTIPLE NEVI: Primary | ICD-10-CM

## 2018-10-11 DIAGNOSIS — Z87.2 HISTORY OF ACTINIC KERATOSES: ICD-10-CM

## 2018-10-11 PROCEDURE — 11401 EXC TR-EXT B9+MARG 0.6-1 CM: CPT | Performed by: DERMATOLOGY

## 2018-10-11 PROCEDURE — 11900 INJECT SKIN LESIONS </W 7: CPT | Performed by: DERMATOLOGY

## 2018-10-11 PROCEDURE — 99213 OFFICE O/P EST LOW 20 MIN: CPT | Performed by: DERMATOLOGY

## 2018-10-11 PROCEDURE — 10060 I&D ABSCESS SIMPLE/SINGLE: CPT | Performed by: DERMATOLOGY

## 2018-10-11 RX ORDER — DOXYCYCLINE HYCLATE 100 MG/1
CAPSULE ORAL
Qty: 20 CAPSULE | Refills: 0 | Status: SHIPPED | OUTPATIENT
Start: 2018-10-11 | End: 2019-04-15

## 2018-10-16 LAB — DERMATOLOGY PATHOLOGY REPORT: NORMAL

## 2018-10-30 ENCOUNTER — OFFICE VISIT (OUTPATIENT)
Dept: RHEUMATOLOGY | Age: 58
End: 2018-10-30
Payer: COMMERCIAL

## 2018-10-30 VITALS
TEMPERATURE: 97.9 F | HEIGHT: 65 IN | BODY MASS INDEX: 26.49 KG/M2 | HEART RATE: 74 BPM | WEIGHT: 159 LBS | SYSTOLIC BLOOD PRESSURE: 118 MMHG | DIASTOLIC BLOOD PRESSURE: 76 MMHG

## 2018-10-30 DIAGNOSIS — L40.9 PSORIASIS: ICD-10-CM

## 2018-10-30 DIAGNOSIS — M15.9 PRIMARY OSTEOARTHRITIS INVOLVING MULTIPLE JOINTS: ICD-10-CM

## 2018-10-30 DIAGNOSIS — Z79.899 HIGH RISK MEDICATION USE: ICD-10-CM

## 2018-10-30 DIAGNOSIS — L40.50 PSORIATIC ARTHRITIS (HCC): Primary | ICD-10-CM

## 2018-10-30 PROCEDURE — 99214 OFFICE O/P EST MOD 30 MIN: CPT | Performed by: INTERNAL MEDICINE

## 2018-11-26 ENCOUNTER — PROCEDURE VISIT (OUTPATIENT)
Dept: DERMATOLOGY | Age: 58
End: 2018-11-26
Payer: COMMERCIAL

## 2018-11-26 DIAGNOSIS — L72.0 EPIDERMOID CYST: Primary | ICD-10-CM

## 2018-11-26 PROCEDURE — 11402 EXC TR-EXT B9+MARG 1.1-2 CM: CPT | Performed by: DERMATOLOGY

## 2018-11-26 PROCEDURE — 12032 INTMD RPR S/A/T/EXT 2.6-7.5: CPT | Performed by: DERMATOLOGY

## 2018-11-28 LAB — DERMATOLOGY PATHOLOGY REPORT: NORMAL

## 2018-12-03 RX ORDER — CLOBETASOL PROPIONATE 0.46 MG/ML
SOLUTION TOPICAL
Qty: 50 ML | Refills: 5 | Status: SHIPPED | OUTPATIENT
Start: 2018-12-03 | End: 2020-06-24 | Stop reason: SDUPTHER

## 2018-12-05 ENCOUNTER — TELEPHONE (OUTPATIENT)
Dept: DERMATOLOGY | Age: 58
End: 2018-12-05

## 2018-12-06 RX ORDER — CLOBETASOL PROPIONATE 0.5 MG/G
OINTMENT TOPICAL
Qty: 60 G | Refills: 1 | Status: SHIPPED | OUTPATIENT
Start: 2018-12-06 | End: 2021-10-18 | Stop reason: SDUPTHER

## 2019-01-11 RX ORDER — ETANERCEPT 50 MG/ML
SOLUTION SUBCUTANEOUS
Qty: 3.92 ML | Refills: 5 | Status: SHIPPED | OUTPATIENT
Start: 2019-01-11 | End: 2019-04-15 | Stop reason: ALTCHOICE

## 2019-01-15 RX ORDER — MELOXICAM 15 MG/1
TABLET ORAL
Qty: 90 TABLET | Refills: 0 | Status: SHIPPED | OUTPATIENT
Start: 2019-01-15 | End: 2019-04-15 | Stop reason: SDUPTHER

## 2019-02-28 ENCOUNTER — OFFICE VISIT (OUTPATIENT)
Dept: RHEUMATOLOGY | Age: 59
End: 2019-02-28
Payer: COMMERCIAL

## 2019-02-28 VITALS
SYSTOLIC BLOOD PRESSURE: 122 MMHG | DIASTOLIC BLOOD PRESSURE: 74 MMHG | BODY MASS INDEX: 26.66 KG/M2 | WEIGHT: 160 LBS | HEIGHT: 65 IN | TEMPERATURE: 98.1 F | HEART RATE: 70 BPM

## 2019-02-28 DIAGNOSIS — L40.50 PSORIATIC ARTHRITIS (HCC): Primary | ICD-10-CM

## 2019-02-28 DIAGNOSIS — L40.9 PSORIASIS: ICD-10-CM

## 2019-02-28 DIAGNOSIS — M15.9 PRIMARY OSTEOARTHRITIS INVOLVING MULTIPLE JOINTS: ICD-10-CM

## 2019-02-28 DIAGNOSIS — Z79.899 HIGH RISK MEDICATION USE: ICD-10-CM

## 2019-02-28 PROCEDURE — 99214 OFFICE O/P EST MOD 30 MIN: CPT | Performed by: INTERNAL MEDICINE

## 2019-03-11 ENCOUNTER — TELEPHONE (OUTPATIENT)
Dept: DERMATOLOGY | Age: 59
End: 2019-03-11

## 2019-03-26 ENCOUNTER — OFFICE VISIT (OUTPATIENT)
Dept: DERMATOLOGY | Age: 59
End: 2019-03-26
Payer: COMMERCIAL

## 2019-03-26 DIAGNOSIS — L82.0 INFLAMED SEBORRHEIC KERATOSIS: Primary | ICD-10-CM

## 2019-03-26 DIAGNOSIS — D48.5 NEOPLASM OF UNCERTAIN BEHAVIOR OF SKIN: ICD-10-CM

## 2019-03-26 PROCEDURE — 11102 TANGNTL BX SKIN SINGLE LES: CPT | Performed by: DERMATOLOGY

## 2019-03-26 PROCEDURE — 17110 DESTRUCTION B9 LES UP TO 14: CPT | Performed by: DERMATOLOGY

## 2019-03-28 LAB — DERMATOLOGY PATHOLOGY REPORT: NORMAL

## 2019-04-15 ENCOUNTER — OFFICE VISIT (OUTPATIENT)
Dept: INTERNAL MEDICINE CLINIC | Age: 59
End: 2019-04-15
Payer: COMMERCIAL

## 2019-04-15 VITALS
DIASTOLIC BLOOD PRESSURE: 66 MMHG | WEIGHT: 160 LBS | BODY MASS INDEX: 26.66 KG/M2 | SYSTOLIC BLOOD PRESSURE: 124 MMHG | HEIGHT: 65 IN

## 2019-04-15 DIAGNOSIS — I10 ESSENTIAL HYPERTENSION: ICD-10-CM

## 2019-04-15 DIAGNOSIS — Z79.899 HIGH RISK MEDICATION USE: ICD-10-CM

## 2019-04-15 DIAGNOSIS — Z12.31 ENCOUNTER FOR SCREENING MAMMOGRAM FOR BREAST CANCER: ICD-10-CM

## 2019-04-15 DIAGNOSIS — E78.2 MIXED HYPERLIPIDEMIA: ICD-10-CM

## 2019-04-15 DIAGNOSIS — I10 ESSENTIAL HYPERTENSION: Primary | ICD-10-CM

## 2019-04-15 DIAGNOSIS — F41.9 ANXIETY: ICD-10-CM

## 2019-04-15 LAB
ANION GAP SERPL CALCULATED.3IONS-SCNC: 11 MMOL/L (ref 3–16)
BASOPHILS ABSOLUTE: 0 K/UL (ref 0–0.2)
BASOPHILS RELATIVE PERCENT: 0.6 %
BUN BLDV-MCNC: 24 MG/DL (ref 7–20)
CALCIUM SERPL-MCNC: 9.8 MG/DL (ref 8.3–10.6)
CHLORIDE BLD-SCNC: 103 MMOL/L (ref 99–110)
CO2: 28 MMOL/L (ref 21–32)
CREAT SERPL-MCNC: 0.8 MG/DL (ref 0.6–1.1)
EOSINOPHILS ABSOLUTE: 0.1 K/UL (ref 0–0.6)
EOSINOPHILS RELATIVE PERCENT: 1 %
GFR AFRICAN AMERICAN: >60
GFR NON-AFRICAN AMERICAN: >60
GLUCOSE BLD-MCNC: 105 MG/DL (ref 70–99)
HCT VFR BLD CALC: 42.3 % (ref 36–48)
HEMOGLOBIN: 14 G/DL (ref 12–16)
LYMPHOCYTES ABSOLUTE: 2 K/UL (ref 1–5.1)
LYMPHOCYTES RELATIVE PERCENT: 26.4 %
MCH RBC QN AUTO: 27.1 PG (ref 26–34)
MCHC RBC AUTO-ENTMCNC: 33 G/DL (ref 31–36)
MCV RBC AUTO: 82 FL (ref 80–100)
MONOCYTES ABSOLUTE: 0.6 K/UL (ref 0–1.3)
MONOCYTES RELATIVE PERCENT: 7.8 %
NEUTROPHILS ABSOLUTE: 4.9 K/UL (ref 1.7–7.7)
NEUTROPHILS RELATIVE PERCENT: 64.2 %
PDW BLD-RTO: 13.2 % (ref 12.4–15.4)
PLATELET # BLD: 323 K/UL (ref 135–450)
PMV BLD AUTO: 9 FL (ref 5–10.5)
POTASSIUM SERPL-SCNC: 4.2 MMOL/L (ref 3.5–5.1)
RBC # BLD: 5.16 M/UL (ref 4–5.2)
SODIUM BLD-SCNC: 142 MMOL/L (ref 136–145)
TSH REFLEX: 3.34 UIU/ML (ref 0.27–4.2)
WBC # BLD: 7.6 K/UL (ref 4–11)

## 2019-04-15 PROCEDURE — 99213 OFFICE O/P EST LOW 20 MIN: CPT | Performed by: INTERNAL MEDICINE

## 2019-04-15 RX ORDER — MELOXICAM 15 MG/1
TABLET ORAL
Qty: 90 TABLET | Refills: 0 | Status: SHIPPED | OUTPATIENT
Start: 2019-04-15 | End: 2019-05-07 | Stop reason: SDUPTHER

## 2019-04-15 ASSESSMENT — ENCOUNTER SYMPTOMS
ABDOMINAL PAIN: 0
CONSTIPATION: 1
DIARRHEA: 1
SHORTNESS OF BREATH: 0
VOMITING: 0
CHEST TIGHTNESS: 0
NAUSEA: 0
BLOOD IN STOOL: 0

## 2019-04-15 ASSESSMENT — PATIENT HEALTH QUESTIONNAIRE - PHQ9
SUM OF ALL RESPONSES TO PHQ9 QUESTIONS 1 & 2: 0
SUM OF ALL RESPONSES TO PHQ QUESTIONS 1-9: 0
1. LITTLE INTEREST OR PLEASURE IN DOING THINGS: 0
SUM OF ALL RESPONSES TO PHQ QUESTIONS 1-9: 0
2. FEELING DOWN, DEPRESSED OR HOPELESS: 0

## 2019-04-15 NOTE — PROGRESS NOTES
Outpatient Note for established Patient - regular Visit    History Obtained From:  patient, electronic medical record  Is the patient new to me ? - Yes    HISTORY OF PRESENT ILLNESS:   The patient is a 66 y.o. female who is here today for :  Noreen Kay was seen today for establish care. Diagnoses and all orders for this visit:    Essential hypertension  BP is controlled  She is on Lisinopril; HCTZ no reported side effects. Pt denies CP/SOB/no LOW/ LOW/ fever/ chills     Anxiety  occasionally  She takes Klonipin 0.5 mg for RLS     Mixed hyperlipidemia  Lab Results   Component Value Date    CHOL 179 06/04/2018    CHOL 170 11/14/2017    CHOL 175 05/04/2017     Lab Results   Component Value Date    TRIG 145 06/04/2018    TRIG 258 (H) 11/14/2017    TRIG 183 (H) 05/04/2017     Lab Results   Component Value Date    HDL 53 06/04/2018    HDL 46 11/14/2017    HDL 47 05/04/2017     Lab Results   Component Value Date    LDLCALC 97 06/04/2018    LDLCALC 72 11/14/2017    LDLCALC 91 05/04/2017     Lab Results   Component Value Date    LABVLDL 29 06/04/2018    LABVLDL 52 11/14/2017    LABVLDL 37 05/04/2017   she is on Lipitor   No reported side effects    Encounter for screening mammogram for breast cancer      She gets constipation: occasionally every day sometimes every 2-3 days. She does not drik enough water. She drinks sodas. She does eats a lot of raw vegetables and fruits. Pt denies CP/SOB/palpitations/abdominal pain/N/V. No blood in the stool/ urine. She had colonoscopy in 12/2018- was good. She had a few polyp (due in 5 years). She had hysterectomy due to stage IA ovarain cancer for which she received one chemotherapy cycles. She had MRI done for facial spasm and per pt she had thyroid findings. No cold/ warm sensation. No depression.      She is on Enbrell for psoriasis     Past Medical History:        Diagnosis Date    Actinic keratosis 6/28/2010    Allergic rhinitis     Anxiety 6/28/2010    Cancer ([facility]     ovarian    Chronic idiopathic constipation 1/19/2017    Hemifacial spasm     Botox    Hyperlipidemia     Osteoarthritis     Psoriasis     Psoriatic arthritis ([facility] [address]        Social History:   TOBACCO:   reports that she has never smoked. She has never used smokeless tobacco.  ETOH:   reports that she drinks alcohol. Current Medications:    Prior to Admission medications    Medication Sig Start Date End Date Taking? Authorizing Provider   meloxicam (MOBIC) 15 MG tablet TAKE 1 TABLET DAILY 4/15/19  Yes Simone Krabbe, MD   clobetasol (TEMOVATE) 0.05 % ointment Apply to affected area BID PRN flares 12/6/18  Yes Chiquita Mora MD   clobetasol (TEMOVATE) 0.05 % external solution Apply to affected area BID PRN flares 12/3/18  Yes Chiquita Mora MD   CLOBEX SPRAY 0.05 % LIQD Apply to scalp daily - bid prn flares. 8/20/18  Yes Chiquita Mora MD   hydrochlorothiazide (HYDRODIURIL) 12.5 MG tablet TAKE 1 TABLET DAILY 7/31/18  Yes MELI Inman - CNP   Estradiol (YUVAFEM) 10 MCG TABS vaginal tablet INSERT 1 TABLET VAGINALLY TWICE WEEKLY 7/5/18  Yes Historical Provider, MD   linaclotide [facility]) 145 MCG capsule Take 1 capsule by mouth every morning (before breakfast) 6/4/18  Yes Elgin Mera MD   lisinopril (PRINIVIL;ZESTRIL) 20 MG tablet Take 1 tablet by mouth daily 5/6/18  Yes Elgin Mera MD   cetirizine (ZYRTEC) 10 MG tablet Take 10 mg by mouth 3/1/10  Yes Historical Provider, MD   lubiprostone (AMITIZA) 24 MCG capsule Take 24 mcg by mouth 9/17/07  Yes Historical Provider, MD   atorvastatin (LIPITOR) 20 MG tablet Take 1 tablet by mouth daily 3/2/18  Yes Elgin Mera MD   tretinoin (RETIN-A) 0.05 % cream Apply a pea sized amount to the face QHS 5/30/17  Yes Chiquita Mora MD   fluticasone (FLONASE) 50 MCG/ACT nasal spray 1 spray by Nasal route daily   Yes Historical Provider, MD   Ascorbic Acid (VITAMIN C) 500 MG tablet Take 1,000 mg by mouth daily.    Yes Historical Provider, MD   Cholecalciferol (VITAMIN D) 1000 UNIT CAPS Take 1 capsule by mouth daily. 2/28/11  Yes Historical Provider, MD   Flaxron Linseed, (FLAXSEED OIL) 1000 MG CAPS Take  by mouth. Yes Historical Provider, MD   Ranny Seats Tea 250 MG CAPS Take  by mouth daily. 6/28/10  Yes Historical Provider, MD   PROBIOTIC CAPS Take  by mouth daily. 6/28/10  Yes Historical Provider, MD   clonazePAM (KLONOPIN) 0.5 MG tablet Take 1 tablet by mouth 2 times daily as needed for Anxiety for up to 45 days. . 7/5/18 8/19/18  Stephanie Monte MD       REVIEW OF SYSTEMS:  Review of Systems   Constitutional: Negative for activity change, appetite change, chills, fever and unexpected weight change. Eyes: Negative for visual disturbance. Respiratory: Negative for chest tightness and shortness of breath. Cardiovascular: Negative for chest pain. Gastrointestinal: Positive for constipation and diarrhea. Negative for abdominal pain, blood in stool, nausea and vomiting. Endocrine: Negative for cold intolerance and heat intolerance. Genitourinary: Negative for hematuria. Skin: Negative for rash. Neurological: Negative for dizziness and headaches. Psychiatric/Behavioral: Negative for dysphoric mood and suicidal ideas. The patient is nervous/anxious. Physical Exam:      Vitals: /66 (Site: Left Upper Arm)   Ht 5' 5\" (1.651 m)   Wt 160 lb (72.6 kg)   LMP  (LMP Unknown)   BMI 26.63 kg/m²     Body mass index is 26.63 kg/m². Wt Readings from Last 3 Encounters:   04/15/19 160 lb (72.6 kg)   02/28/19 160 lb (72.6 kg)   10/30/18 159 lb (72.1 kg)     Physical Exam   Constitutional: She is oriented to person, place, and time. She appears well-developed and well-nourished. No distress. HENT:   Head: Normocephalic and atraumatic. Mouth/Throat: Oropharynx is clear and moist. No oropharyngeal exudate. Eyes: Conjunctivae and EOM are normal. Right eye exhibits no discharge. Left eye exhibits no discharge.  No scleral

## 2019-04-16 LAB
ALBUMIN SERPL-MCNC: 4.5 G/DL (ref 3.4–5)
ALP BLD-CCNC: 96 U/L (ref 40–129)
ALT SERPL-CCNC: 17 U/L (ref 10–40)
AST SERPL-CCNC: 17 U/L (ref 15–37)
BILIRUB SERPL-MCNC: <0.2 MG/DL (ref 0–1)
BILIRUBIN DIRECT: <0.2 MG/DL (ref 0–0.3)
BILIRUBIN, INDIRECT: NORMAL MG/DL (ref 0–1)
TOTAL PROTEIN: 7.3 G/DL (ref 6.4–8.2)

## 2019-05-06 ENCOUNTER — OFFICE VISIT (OUTPATIENT)
Dept: DERMATOLOGY | Age: 59
End: 2019-05-06
Payer: COMMERCIAL

## 2019-05-06 DIAGNOSIS — L40.9 PSORIASIS: ICD-10-CM

## 2019-05-06 DIAGNOSIS — D22.9 MULTIPLE NEVI: Primary | ICD-10-CM

## 2019-05-06 DIAGNOSIS — Z87.2 HISTORY OF ACTINIC KERATOSES: ICD-10-CM

## 2019-05-06 PROCEDURE — 99214 OFFICE O/P EST MOD 30 MIN: CPT | Performed by: DERMATOLOGY

## 2019-05-06 NOTE — PROGRESS NOTES
[facility] Dermatology  Arleen Lunsford Bending  1960    66 y.o. female     Date of Visit: 5/6/2019    Last seen: 3-2019 for lesions;  for FSE    Chief Complaint: moles/lesions  Chief Complaint   Patient presents with    Skin Exam     Full body mole check; no new concerns      History of Present Illness:    1. Mult nevi - here for full skin check; no new concerns or changing lesions. 2. F/u for AKs - no new concerns or probs with previous sites. 3. Hx of psoriasis, mainly scalp (on Enbrel for PsA arthritis) - sees Dr. Manjit Biggs now; well-controlled with clobex spray as needed flares on the scalp and clobetasol oint prn flares elsewhere. She has been flaring on the occipital scalp and a few lesions on the dsital LE's currently. S/p excision of 2 cysts on the back in 2018 - one had ruptured back in the summer -  Ended up in ED and had I/D with packing and abx - clinda then doxy d/t culture grew staph lugdunensis prior to excision. Hx of ovarian cancer; her sister was diagnosed with breast CA  In the past few years - her sister was BRCA neg. She has had her veins treated. Review of Systems:  Gen: Feels well, good sense of health. Skin: No changing moles or lesions. Past Medical History, Family History, Surgical History, Medications and Allergies reviewed. Outpatient Medications Marked as Taking for the 5/6/19 encounter (Office Visit) with Shilo Hardin MD   Medication Sig Dispense Refill    meloxicam (MOBIC) 15 MG tablet TAKE 1 TABLET DAILY 90 tablet 0    clobetasol (TEMOVATE) 0.05 % ointment Apply to affected area BID PRN flares 60 g 1    clobetasol (TEMOVATE) 0.05 % external solution Apply to affected area BID PRN flares 50 mL 5    CLOBEX SPRAY 0.05 % LIQD Apply to scalp daily - bid prn flares.  250 mL 3    hydrochlorothiazide (HYDRODIURIL) 12.5 MG tablet TAKE 1 TABLET DAILY 30 tablet 0    Estradiol (YUVAFEM) 10 MCG TABS vaginal tablet INSERT 1 TABLET VAGINALLY TWICE WEEKLY      linaclotide (LINZESS) 145 MCG capsule Take 1 capsule by mouth every morning (before breakfast) 90 capsule 1    lisinopril (PRINIVIL;ZESTRIL) 20 MG tablet Take 1 tablet by mouth daily 90 tablet 1    cetirizine (ZYRTEC) 10 MG tablet Take 10 mg by mouth      atorvastatin (LIPITOR) 20 MG tablet Take 1 tablet by mouth daily 90 tablet 1    tretinoin (RETIN-A) 0.05 % cream Apply a pea sized amount to the face QHS 45 g 3    fluticasone (FLONASE) 50 MCG/ACT nasal spray 1 spray by Nasal route daily      Ascorbic Acid (VITAMIN C) 500 MG tablet Take 1,000 mg by mouth daily.  Cholecalciferol (VITAMIN D) 1000 UNIT CAPS Take 1 capsule by mouth daily.  Flaxseed, Linseed, (FLAXSEED OIL) 1000 MG CAPS Take  by mouth.  Green Tea 250 MG CAPS Take  by mouth daily.  PROBIOTIC CAPS Take  by mouth daily.        Allergies   Allergen Reactions    Sulfa Antibiotics Rash    Codeine        Past Medical History:   Diagnosis Date    Actinic keratosis 2010    Allergic rhinitis     Anxiety 2010    Cancer ([facility] [address]     ovarian    Chronic idiopathic constipation 2017    Hemifacial spasm     Botox    Hyperlipidemia     Osteoarthritis     Psoriasis     Psoriatic arthritis ([facility] [address]      Past Surgical History:   Procedure Laterality Date    BRAIN SURGERY      for blood vessel abnormality    BREAST REDUCTION SURGERY       SECTION      COLONOSCOPY      FINGER SURGERY  2007    Left index finger     HYSTERECTOMY      INNER EAR SURGERY  2008    repair artery right ear    NEUROMA SURGERY      microvascular reconstruction to fix hemifacial spasm    RHINOPLASTY      SHOULDER SURGERY      left shoulder    AMRITA AND BSO      TOE SURGERY      TONSILLECTOMY      VARICOSE VEIN SURGERY      WRIST SURGERY      right wrist       Physical Examination     Gen, well-appearing  The following were examined and determined to be normal: Psych/Neuro, Head/face, Conjunctivae/eyelids, Gums/teeth/lips, Neck, Breast/axilla/chest, Abdomen, Back, RUE, LUE, Nails/digits and buttocks. The following were examined and determined to be abnormal: Scalp/hair, RLE and LLE.     trunk and extremities with scattered brown macules and papules   No AK's  Occipital scalp with scaly patches; each LE with pink scaly macule    Assessment and Plan     1. Benign-appearing nevi  2. Hx AK's - no new concerns  - educ re ABCD's of MM   educ sun protection   encouraged skin check yearly (sooner if indicated), self checks    3.  Psoriasis - flaring on the scalp and focal macules on the legs, 2% BSA  - clobex spray/soln for the scalp prn flares; ed se/misuse  - clobetasol oint daily - bid prn flares; ed se/misuse  - cont Enbrel per rheum - no skin side effects

## 2019-05-07 DIAGNOSIS — I10 ESSENTIAL HYPERTENSION: ICD-10-CM

## 2019-05-07 DIAGNOSIS — F41.9 ANXIETY: ICD-10-CM

## 2019-05-07 DIAGNOSIS — E78.2 MIXED HYPERLIPIDEMIA: ICD-10-CM

## 2019-05-07 DIAGNOSIS — G25.81 RESTLESS LEGS SYNDROME (RLS): Primary | ICD-10-CM

## 2019-05-07 RX ORDER — MELOXICAM 15 MG/1
15 TABLET ORAL DAILY
Qty: 90 TABLET | Refills: 0 | Status: SHIPPED | OUTPATIENT
Start: 2019-05-07 | End: 2019-06-04 | Stop reason: SDUPTHER

## 2019-05-07 NOTE — TELEPHONE ENCOUNTER
Pt will need contract for clonazepam.   [facility] verified. Last fill 4/7/19 # 180 by Dr. Nata Robertson. This has not been filled by Dr. Ninoska Nguyen. She takes it for RLS. Last seen by Dr. Ninoska Nguyen 4/15/19. Next appt. 8/15/19. She will need to F/U sooner .

## 2019-05-14 RX ORDER — CLONAZEPAM 0.5 MG/1
0.5 TABLET ORAL 2 TIMES DAILY PRN
Qty: 60 TABLET | Refills: 1 | Status: SHIPPED | OUTPATIENT
Start: 2019-05-14 | End: 2019-06-04 | Stop reason: SDUPTHER

## 2019-05-14 RX ORDER — LISINOPRIL 20 MG/1
20 TABLET ORAL DAILY
Qty: 90 TABLET | Refills: 2 | Status: SHIPPED | OUTPATIENT
Start: 2019-05-14 | End: 2019-06-04 | Stop reason: SDUPTHER

## 2019-05-14 RX ORDER — HYDROCHLOROTHIAZIDE 12.5 MG/1
12.5 TABLET ORAL DAILY
Qty: 90 TABLET | Refills: 2 | Status: SHIPPED | OUTPATIENT
Start: 2019-05-14 | End: 2019-06-04 | Stop reason: SDUPTHER

## 2019-05-14 RX ORDER — ATORVASTATIN CALCIUM 20 MG/1
20 TABLET, FILM COATED ORAL DAILY
Qty: 90 TABLET | Refills: 1 | Status: SHIPPED | OUTPATIENT
Start: 2019-05-14 | End: 2019-06-04 | Stop reason: SDUPTHER

## 2019-06-04 DIAGNOSIS — G25.81 RESTLESS LEGS SYNDROME (RLS): ICD-10-CM

## 2019-06-04 DIAGNOSIS — E78.2 MIXED HYPERLIPIDEMIA: ICD-10-CM

## 2019-06-04 DIAGNOSIS — I10 ESSENTIAL HYPERTENSION: ICD-10-CM

## 2019-06-04 DIAGNOSIS — F41.9 ANXIETY: ICD-10-CM

## 2019-06-04 RX ORDER — LISINOPRIL 20 MG/1
20 TABLET ORAL DAILY
Qty: 90 TABLET | Refills: 2 | Status: SHIPPED | OUTPATIENT
Start: 2019-06-04 | End: 2019-08-15 | Stop reason: SDUPTHER

## 2019-06-04 RX ORDER — ATORVASTATIN CALCIUM 20 MG/1
20 TABLET, FILM COATED ORAL DAILY
Qty: 90 TABLET | Refills: 1 | Status: SHIPPED | OUTPATIENT
Start: 2019-06-04 | End: 2019-08-15 | Stop reason: SDUPTHER

## 2019-06-04 RX ORDER — MELOXICAM 15 MG/1
15 TABLET ORAL DAILY
Qty: 90 TABLET | Refills: 0 | Status: SHIPPED | OUTPATIENT
Start: 2019-06-04 | End: 2019-08-15 | Stop reason: SDUPTHER

## 2019-06-04 RX ORDER — HYDROCHLOROTHIAZIDE 12.5 MG/1
12.5 TABLET ORAL DAILY
Qty: 90 TABLET | Refills: 2 | Status: SHIPPED | OUTPATIENT
Start: 2019-06-04 | End: 2019-11-19

## 2019-06-05 RX ORDER — CLONAZEPAM 0.5 MG/1
0.5 TABLET ORAL 2 TIMES DAILY PRN
Qty: 60 TABLET | Refills: 1 | OUTPATIENT
Start: 2019-06-05 | End: 2019-08-15 | Stop reason: SDUPTHER

## 2019-06-28 ENCOUNTER — OFFICE VISIT (OUTPATIENT)
Dept: RHEUMATOLOGY | Age: 59
End: 2019-06-28
Payer: COMMERCIAL

## 2019-06-28 VITALS
HEIGHT: 65 IN | DIASTOLIC BLOOD PRESSURE: 76 MMHG | SYSTOLIC BLOOD PRESSURE: 120 MMHG | WEIGHT: 160 LBS | BODY MASS INDEX: 26.66 KG/M2

## 2019-06-28 DIAGNOSIS — M25.562 CHRONIC PAIN OF BOTH KNEES: ICD-10-CM

## 2019-06-28 DIAGNOSIS — L40.9 PSORIASIS: ICD-10-CM

## 2019-06-28 DIAGNOSIS — Z79.899 HIGH RISK MEDICATION USE: ICD-10-CM

## 2019-06-28 DIAGNOSIS — L40.50 PSORIATIC ARTHRITIS (HCC): Primary | ICD-10-CM

## 2019-06-28 DIAGNOSIS — G89.29 CHRONIC PAIN OF BOTH KNEES: ICD-10-CM

## 2019-06-28 DIAGNOSIS — M25.561 CHRONIC PAIN OF BOTH KNEES: ICD-10-CM

## 2019-06-28 PROCEDURE — 99214 OFFICE O/P EST MOD 30 MIN: CPT | Performed by: INTERNAL MEDICINE

## 2019-06-28 RX ORDER — MELOXICAM 15 MG/1
15 TABLET ORAL DAILY
Qty: 90 TABLET | Refills: 3 | Status: SHIPPED | OUTPATIENT
Start: 2019-06-28 | End: 2019-08-15

## 2019-06-28 NOTE — PROGRESS NOTES
[address] Yang MD                                                 [address]                                               [phone] (O) [phone] (F)      Primary provider: Trini Potts MD  Patient identification: Kenan Bailey: 1960,Sex: female         ASSESSMENT/PLAN:    1. Psoriatic arthritis ([facility] [address]    2. Psoriasis    3. High risk medication use    4. 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  Onset at the age of 24- started with Dactylitis, then progressed to wide spread inflammatory arthritis. Started with Medrol, Sulfasalazine, Enbrel-early 2000's. Today's visit-  Psoriatic arthritis is in remission on Enbrel 50 mg weekly. Psoriasis-few lesions in her scalp, elbows, manageable with topical steroids. Intermittent bilateral knee pain from osteoarthritis, had flare last week is doing better now. Plan-continue current medications. Okay to take over-the-counter Aleve or Advil for intercurrent arthralgias. Call me with any flares. Patient indicates understanding and agrees with the management plan. I reviewed patients medical information and medical history in the medical records. Note is transcribed using voice recognition software. Inadvertent computerized transcription errors may be present. ##################################################################    Subjective: Follow-up for psoriasis, psoriatic arthritis and osteoarthritis. Interval Timothy Mitchell is doing well in terms of psoriasis, psoriatic arthritis. Small skin flakes in the elbows and anterior chest, uses topical steroids. Couple of weeks ago, she noticed pain, stiffness, swelling in the right knee, got better without any intervention.   Does not have 2    lisinopril (PRINIVIL;ZESTRIL) 20 MG tablet Take 1 tablet by mouth daily 90 tablet 2    clobetasol (TEMOVATE) 0.05 % ointment Apply to affected area BID PRN flares 60 g 1    clobetasol (TEMOVATE) 0.05 % external solution Apply to affected area BID PRN flares 50 mL 5    CLOBEX SPRAY 0.05 % LIQD Apply to scalp daily - bid prn flares. 250 mL 3    Estradiol (YUVAFEM) 10 MCG TABS vaginal tablet INSERT 1 TABLET VAGINALLY TWICE WEEKLY      cetirizine (ZYRTEC) 10 MG tablet Take 10 mg by mouth      lubiprostone (AMITIZA) 24 MCG capsule Take 24 mcg by mouth      tretinoin (RETIN-A) 0.05 % cream Apply a pea sized amount to the face QHS 45 g 3    fluticasone (FLONASE) 50 MCG/ACT nasal spray 1 spray by Nasal route daily      Ascorbic Acid (VITAMIN C) 500 MG tablet Take 1,000 mg by mouth daily.  Cholecalciferol (VITAMIN D) 1000 UNIT CAPS Take 1 capsule by mouth daily.  Flaxseed, Linseed, (FLAXSEED OIL) 1000 MG CAPS Take  by mouth.  Green Tea 250 MG CAPS Take  by mouth daily.  PROBIOTIC CAPS Take  by mouth daily. No current facility-administered medications for this visit. Allergies   Allergen Reactions    Sulfa Antibiotics Rash    Codeine          OBJECTIVE  Physical    Vitals:    06/28/19 0858   BP: 120/76       General appearance/psychiatric: Well nourished and well groomed, normal judgment. Alert, appears stated age and cooperative. MKS: R wrist - surgery for arthritic deformity.   Toes- 2,3 - corrective toe surgery    28 joint JOINT COUNT:28 joint count 0                               Right                                                  Left   Swell Tender Swell Tender   PIP1           PIP2           PIP3           PIP4          PIP5          MCP1           MCP2           MCP3           MCP4           MCP5           Wrist           Elbow           Shoulder          Knee             Other than osteoarthritic changes in the knees,  musculoskeletal examination upper, lower extremities and spine- notender, swollen or inflamed joints in upper or lower extremities. Full range of motion in all peripheral joints. No dactylitis or enthesitis. Skin: no rashes today. Denies any skin thickening or digital ischemia. HEENT: Normal lids/conjunctiva and pupils. No oral or nasal ulcers. Salivary glands reveals no evidence of abnormality. Data:  Lab Results   Component Value Date    WBC 7.6 04/15/2019    RBC 5.16 04/15/2019    HGB 14.0 04/15/2019    HCT 42.3 04/15/2019     04/15/2019    MCV 82.0 04/15/2019    MCH 27.1 04/15/2019    MCHC 33.0 04/15/2019    RDW 13.2 04/15/2019    SEGSPCT 55.3 03/15/2013    LYMPHOPCT 26.4 04/15/2019    MONOPCT 7.8 04/15/2019    BASOPCT 0.6 04/15/2019    MONOSABS 0.6 04/15/2019    LYMPHSABS 2.0 04/15/2019    EOSABS 0.1 04/15/2019    BASOSABS 0.0 04/15/2019    DIFFTYPE Auto-K 03/15/2013       Chemistry        Component Value Date/Time     04/15/2019 1526    K 4.2 04/15/2019 1526     04/15/2019 1526    CO2 28 04/15/2019 1526    BUN 24 (H) 04/15/2019 1526    CREATININE 0.8 04/15/2019 1526        Component Value Date/Time    CALCIUM 9.8 04/15/2019 1526    ALKPHOS 96 04/15/2019 1526    AST 17 04/15/2019 1526    ALT 17 04/15/2019 1526    BILITOT <0.2 04/15/2019 1526             I thank you for giving me the opportunity to be involved in [address] care and I look forward following Radha Da Silva along with you. If you have any questions or concerns please feel free to contact me at any time.     Chanda Perez MD 06/28/19

## 2019-08-15 ENCOUNTER — OFFICE VISIT (OUTPATIENT)
Dept: INTERNAL MEDICINE CLINIC | Age: 59
End: 2019-08-15
Payer: COMMERCIAL

## 2019-08-15 VITALS
DIASTOLIC BLOOD PRESSURE: 68 MMHG | WEIGHT: 160 LBS | BODY MASS INDEX: 26.63 KG/M2 | SYSTOLIC BLOOD PRESSURE: 102 MMHG | OXYGEN SATURATION: 98 % | HEART RATE: 78 BPM

## 2019-08-15 DIAGNOSIS — L40.50 PSORIATIC ARTHRITIS (HCC): ICD-10-CM

## 2019-08-15 DIAGNOSIS — Z86.19: ICD-10-CM

## 2019-08-15 DIAGNOSIS — E78.2 MIXED HYPERLIPIDEMIA: ICD-10-CM

## 2019-08-15 DIAGNOSIS — I10 ESSENTIAL HYPERTENSION: Primary | ICD-10-CM

## 2019-08-15 DIAGNOSIS — G25.81 RESTLESS LEGS SYNDROME (RLS): ICD-10-CM

## 2019-08-15 DIAGNOSIS — F41.9 ANXIETY: ICD-10-CM

## 2019-08-15 PROCEDURE — 99214 OFFICE O/P EST MOD 30 MIN: CPT | Performed by: INTERNAL MEDICINE

## 2019-08-15 RX ORDER — LISINOPRIL 20 MG/1
20 TABLET ORAL DAILY
Qty: 90 TABLET | Refills: 2 | Status: SHIPPED | OUTPATIENT
Start: 2019-08-15 | End: 2020-05-08 | Stop reason: SDUPTHER

## 2019-08-15 RX ORDER — ATORVASTATIN CALCIUM 20 MG/1
20 TABLET, FILM COATED ORAL DAILY
Qty: 90 TABLET | Refills: 2 | Status: SHIPPED | OUTPATIENT
Start: 2019-08-15 | End: 2019-11-20 | Stop reason: SDUPTHER

## 2019-08-15 RX ORDER — MELOXICAM 15 MG/1
15 TABLET ORAL DAILY
Qty: 90 TABLET | Refills: 2 | Status: SHIPPED | OUTPATIENT
Start: 2019-08-15 | End: 2020-04-23

## 2019-08-15 RX ORDER — HYDROCHLOROTHIAZIDE 12.5 MG/1
12.5 TABLET ORAL DAILY
Qty: 90 TABLET | Refills: 2 | Status: CANCELLED | OUTPATIENT
Start: 2019-08-15

## 2019-08-15 RX ORDER — CLONAZEPAM 0.5 MG/1
0.5 TABLET ORAL DAILY PRN
Qty: 90 TABLET | Refills: 0 | Status: SHIPPED | OUTPATIENT
Start: 2019-08-15 | End: 2019-11-19 | Stop reason: SDUPTHER

## 2019-08-15 ASSESSMENT — ENCOUNTER SYMPTOMS
VOMITING: 0
DIARRHEA: 1
CHEST TIGHTNESS: 0
ABDOMINAL PAIN: 0
SHORTNESS OF BREATH: 0
BLOOD IN STOOL: 0
NAUSEA: 0

## 2019-09-24 ENCOUNTER — PATIENT MESSAGE (OUTPATIENT)
Dept: DERMATOLOGY | Age: 59
End: 2019-09-24

## 2019-09-24 NOTE — TELEPHONE ENCOUNTER
From: Gissell Warren  To: Maeve Hall MD  Sent: 9/24/2019 9:31 AM EDT  Subject: Prescription Question    Hi Dr. Jaclyn Correa:    The clobetesol has quit working on my psoriasis. Can I try something different/stronger on my scalp? Thank you!   Kwan Vences

## 2019-09-27 RX ORDER — CALCIPOTRIENE 50 UG/G
AEROSOL, FOAM TOPICAL
Qty: 120 G | Refills: 1 | Status: SHIPPED | OUTPATIENT
Start: 2019-09-27 | End: 2020-10-06 | Stop reason: SDUPTHER

## 2019-10-15 ENCOUNTER — TELEPHONE (OUTPATIENT)
Dept: INTERNAL MEDICINE CLINIC | Age: 59
End: 2019-10-15

## 2019-10-16 ENCOUNTER — TELEPHONE (OUTPATIENT)
Dept: INTERNAL MEDICINE CLINIC | Age: 59
End: 2019-10-16

## 2019-10-29 ENCOUNTER — OFFICE VISIT (OUTPATIENT)
Dept: RHEUMATOLOGY | Age: 59
End: 2019-10-29
Payer: COMMERCIAL

## 2019-10-29 VITALS
WEIGHT: 156 LBS | SYSTOLIC BLOOD PRESSURE: 116 MMHG | HEIGHT: 65 IN | BODY MASS INDEX: 25.99 KG/M2 | DIASTOLIC BLOOD PRESSURE: 74 MMHG

## 2019-10-29 DIAGNOSIS — Z79.899 HIGH RISK MEDICATION USE: ICD-10-CM

## 2019-10-29 DIAGNOSIS — J01.01 ACUTE RECURRENT MAXILLARY SINUSITIS: ICD-10-CM

## 2019-10-29 DIAGNOSIS — L40.50 PSORIATIC ARTHRITIS (HCC): Primary | ICD-10-CM

## 2019-10-29 DIAGNOSIS — L40.9 PSORIASIS: ICD-10-CM

## 2019-10-29 PROCEDURE — 99214 OFFICE O/P EST MOD 30 MIN: CPT | Performed by: INTERNAL MEDICINE

## 2019-10-29 RX ORDER — FLUCONAZOLE 150 MG/1
150 TABLET ORAL ONCE
Qty: 1 TABLET | Refills: 0 | Status: SHIPPED | OUTPATIENT
Start: 2019-10-29 | End: 2019-10-29

## 2019-10-29 RX ORDER — LEVOFLOXACIN 500 MG/1
500 TABLET, FILM COATED ORAL DAILY
Qty: 7 TABLET | Refills: 0 | Status: SHIPPED | OUTPATIENT
Start: 2019-10-29 | End: 2019-11-05

## 2019-11-14 ENCOUNTER — OFFICE VISIT (OUTPATIENT)
Dept: DERMATOLOGY | Age: 59
End: 2019-11-14
Payer: COMMERCIAL

## 2019-11-14 DIAGNOSIS — D48.5 NEOPLASM OF UNCERTAIN BEHAVIOR OF SKIN: ICD-10-CM

## 2019-11-14 DIAGNOSIS — Z87.2 HISTORY OF ACTINIC KERATOSES: ICD-10-CM

## 2019-11-14 DIAGNOSIS — D22.9 MULTIPLE NEVI: Primary | ICD-10-CM

## 2019-11-14 DIAGNOSIS — L40.9 PSORIASIS: ICD-10-CM

## 2019-11-14 PROCEDURE — 99214 OFFICE O/P EST MOD 30 MIN: CPT | Performed by: DERMATOLOGY

## 2019-11-14 PROCEDURE — 11102 TANGNTL BX SKIN SINGLE LES: CPT | Performed by: DERMATOLOGY

## 2019-11-14 RX ORDER — FLUOCINOLONE ACETONIDE 0.11 MG/ML
OIL TOPICAL
Qty: 120 ML | Refills: 1 | Status: SHIPPED | OUTPATIENT
Start: 2019-11-14 | End: 2019-11-19 | Stop reason: SDUPTHER

## 2019-11-19 ENCOUNTER — OFFICE VISIT (OUTPATIENT)
Dept: INTERNAL MEDICINE CLINIC | Age: 59
End: 2019-11-19
Payer: COMMERCIAL

## 2019-11-19 VITALS
DIASTOLIC BLOOD PRESSURE: 82 MMHG | BODY MASS INDEX: 25.33 KG/M2 | HEIGHT: 65 IN | HEART RATE: 76 BPM | OXYGEN SATURATION: 98 % | WEIGHT: 152 LBS | SYSTOLIC BLOOD PRESSURE: 108 MMHG

## 2019-11-19 DIAGNOSIS — I10 ESSENTIAL HYPERTENSION: Primary | ICD-10-CM

## 2019-11-19 DIAGNOSIS — G25.81 RESTLESS LEGS SYNDROME (RLS): ICD-10-CM

## 2019-11-19 DIAGNOSIS — K59.00 CONSTIPATION, UNSPECIFIED CONSTIPATION TYPE: ICD-10-CM

## 2019-11-19 DIAGNOSIS — F41.9 ANXIETY: ICD-10-CM

## 2019-11-19 PROCEDURE — 99214 OFFICE O/P EST MOD 30 MIN: CPT | Performed by: INTERNAL MEDICINE

## 2019-11-19 RX ORDER — CLONAZEPAM 0.5 MG/1
0.5 TABLET ORAL DAILY PRN
Qty: 90 TABLET | Refills: 0 | Status: SHIPPED | OUTPATIENT
Start: 2019-11-19 | End: 2020-02-19

## 2019-11-19 RX ORDER — HYDROCHLOROTHIAZIDE 12.5 MG/1
12.5 TABLET ORAL DAILY
Qty: 90 TABLET | Refills: 2
Start: 2019-11-19 | End: 2020-02-03 | Stop reason: SDUPTHER

## 2019-11-19 RX ORDER — FLUOCINOLONE ACETONIDE 0.11 MG/ML
OIL TOPICAL
Qty: 120 ML | Refills: 1 | Status: SHIPPED | OUTPATIENT
Start: 2019-11-19 | End: 2020-10-06 | Stop reason: SDUPTHER

## 2019-11-19 ASSESSMENT — ENCOUNTER SYMPTOMS
NAUSEA: 0
VOMITING: 0
SHORTNESS OF BREATH: 0
CHEST TIGHTNESS: 0
ABDOMINAL PAIN: 0

## 2019-11-20 DIAGNOSIS — E78.2 MIXED HYPERLIPIDEMIA: ICD-10-CM

## 2019-11-20 LAB — DERMATOLOGY PATHOLOGY REPORT: NORMAL

## 2019-11-20 RX ORDER — ATORVASTATIN CALCIUM 20 MG/1
20 TABLET, FILM COATED ORAL DAILY
Qty: 90 TABLET | Refills: 2 | Status: SHIPPED | OUTPATIENT
Start: 2019-11-20 | End: 2020-12-04

## 2019-12-02 RX ORDER — TRETINOIN 0.5 MG/G
CREAM TOPICAL
Qty: 45 G | Refills: 3 | Status: SHIPPED | OUTPATIENT
Start: 2019-12-02 | End: 2021-11-08 | Stop reason: SDUPTHER

## 2019-12-16 ENCOUNTER — PATIENT MESSAGE (OUTPATIENT)
Dept: DERMATOLOGY | Age: 59
End: 2019-12-16

## 2020-02-03 ENCOUNTER — OFFICE VISIT (OUTPATIENT)
Dept: RHEUMATOLOGY | Age: 60
End: 2020-02-03
Payer: COMMERCIAL

## 2020-02-03 VITALS
WEIGHT: 154 LBS | HEIGHT: 65 IN | SYSTOLIC BLOOD PRESSURE: 112 MMHG | BODY MASS INDEX: 25.66 KG/M2 | DIASTOLIC BLOOD PRESSURE: 76 MMHG

## 2020-02-03 DIAGNOSIS — Z79.899 HIGH RISK MEDICATION USE: ICD-10-CM

## 2020-02-03 LAB
BASOPHILS ABSOLUTE: 0.1 K/UL (ref 0–0.2)
BASOPHILS RELATIVE PERCENT: 1.1 %
CREAT SERPL-MCNC: 0.8 MG/DL (ref 0.6–1.1)
EOSINOPHILS ABSOLUTE: 0.1 K/UL (ref 0–0.6)
EOSINOPHILS RELATIVE PERCENT: 1.9 %
GFR AFRICAN AMERICAN: >60
GFR NON-AFRICAN AMERICAN: >60
HCT VFR BLD CALC: 43.3 % (ref 36–48)
HEMOGLOBIN: 14.2 G/DL (ref 12–16)
LYMPHOCYTES ABSOLUTE: 1.8 K/UL (ref 1–5.1)
LYMPHOCYTES RELATIVE PERCENT: 29.3 %
MCH RBC QN AUTO: 27.2 PG (ref 26–34)
MCHC RBC AUTO-ENTMCNC: 32.8 G/DL (ref 31–36)
MCV RBC AUTO: 82.9 FL (ref 80–100)
MONOCYTES ABSOLUTE: 0.6 K/UL (ref 0–1.3)
MONOCYTES RELATIVE PERCENT: 9 %
NEUTROPHILS ABSOLUTE: 3.6 K/UL (ref 1.7–7.7)
NEUTROPHILS RELATIVE PERCENT: 58.7 %
PDW BLD-RTO: 13.5 % (ref 12.4–15.4)
PLATELET # BLD: 288 K/UL (ref 135–450)
PMV BLD AUTO: 8.5 FL (ref 5–10.5)
RBC # BLD: 5.23 M/UL (ref 4–5.2)
WBC # BLD: 6.2 K/UL (ref 4–11)

## 2020-02-03 PROCEDURE — 99214 OFFICE O/P EST MOD 30 MIN: CPT | Performed by: INTERNAL MEDICINE

## 2020-02-03 NOTE — PROGRESS NOTES
[address] Yang MD                                                                                                 (Y) [phone] (F)      Primary provider: Liliam Rosas MD  Patient identification: Jorge A Zaldivar: 1960,Sex: female         ASSESSMENT/PLAN:    Tana Hoyos was seen today for follow-up. Diagnoses and all orders for this visit:    Psoriatic arthritis ([facility] [address]    Psoriasis    High risk medication use    Primary osteoarthritis involving multiple joints      Ovarian cancer 2003- mucinous adenocarcinoma, surgery, Chemo- cancer free- follows up with oncologist.  Onset at the age of 24- started with Dactylitis, then progressed to wide spread inflammatory arthritis. Started with Medrol, Sulfasalazine, Enbrel-early 2000's. A/P Today's visit-  Psoriatic arthritis is in remission on 50 mg Enbrel. Psoriasis is also doing well, taking several topical agents along with Enbrel. No complaints or concerns. Normal ADLs. Takes meloxicam for osteoarthritis, tolerating it well. Plan-  Check labs today, continue current medications. Call me with any symptoms. Follow-up in 4 to 6 months. Patient indicates understanding and agrees with the management plan. I reviewed patients medical information and medical history in the medical records. Note is transcribed using voice recognition software. Inadvertent computerized transcription errors may be present. ##################################################################    Subjective: Follow-up for psoriasis, psoriatic arthritis and osteoarthritis. Timi Los Grajeda is doing very well in terms of psoriasis, psoriatic arthritis on Enbrel and several topical medications for psoriasis.   She continues to have arthralgias in her neck, back, knees from osteoarthritis, takes meloxicam in a regular basis, unable to mouth daily TAKE 1 TABLET DAILY 90 tablet 2    lisinopril (PRINIVIL;ZESTRIL) 20 MG tablet Take 1 tablet by mouth daily 90 tablet 2    linaCLOtide (LINZESS) 72 MCG CAPS capsule Take 1 capsule by mouth every morning (before breakfast) 90 capsule 3    Etanercept 50 MG/ML SOAJ Inject 50 mg sc once a week- same of the week 4 Syringe 5    clobetasol (TEMOVATE) 0.05 % ointment Apply to affected area BID PRN flares 60 g 1    clobetasol (TEMOVATE) 0.05 % external solution Apply to affected area BID PRN flares 50 mL 5    CLOBEX SPRAY 0.05 % LIQD Apply to scalp daily - bid prn flares. 250 mL 3    Estradiol (YUVAFEM) 10 MCG TABS vaginal tablet INSERT 1 TABLET VAGINALLY TWICE WEEKLY      cetirizine (ZYRTEC) 10 MG tablet Take 10 mg by mouth      fluticasone (FLONASE) 50 MCG/ACT nasal spray 1 spray by Nasal route daily      Ascorbic Acid (VITAMIN C) 500 MG tablet Take 1,000 mg by mouth daily.  Cholecalciferol (VITAMIN D) 1000 UNIT CAPS Take 1 capsule by mouth daily.  Flaxseed, Linseed, (FLAXSEED OIL) 1000 MG CAPS Take  by mouth.  Green Tea 250 MG CAPS Take  by mouth daily.  PROBIOTIC CAPS Take  by mouth daily. No current facility-administered medications for this visit. Allergies   Allergen Reactions    Sulfa Antibiotics Rash    Codeine          OBJECTIVE  Physical    Vitals:    02/03/20 0749   BP: 112/76       General appearance/psychiatric: Well nourished and well groomed, normal judgment. Alert, appears stated age and cooperative. MKS: R wrist - surgery for arthritic deformity.   Toes- 2,3 - corrective toe surgery    28 joint JOINT COUNT:28 joint count 0                               Right                                                  Left   Swell Tender Swell Tender   PIP1           PIP2           PIP3           PIP4          PIP5          MCP1           MCP2           MCP3           MCP4           MCP5           Wrist           Elbow           Shoulder          Knee She did not have any tender, swollen or inflamed joints in upper or lower extremities. Full range of motion in all peripheral joints. Normal spine exam.  No dactylitis or enthesitis. Skin: no rashes today. Denies any skin thickening or digital ischemia. Scalp psoriasis is also resolved. HEENT: Normal lids/conjunctiva and pupils. Bilateral frontal and maxillary sinus tenderness. Data:  Lab Results   Component Value Date    WBC 7.6 04/15/2019    RBC 5.16 04/15/2019    HGB 14.0 04/15/2019    HCT 42.3 04/15/2019     04/15/2019    MCV 82.0 04/15/2019    MCH 27.1 04/15/2019    MCHC 33.0 04/15/2019    RDW 13.2 04/15/2019    SEGSPCT 55.3 03/15/2013    LYMPHOPCT 26.4 04/15/2019    MONOPCT 7.8 04/15/2019    BASOPCT 0.6 04/15/2019    MONOSABS 0.6 04/15/2019    LYMPHSABS 2.0 04/15/2019    EOSABS 0.1 04/15/2019    BASOSABS 0.0 04/15/2019    DIFFTYPE Auto-K 03/15/2013       Chemistry        Component Value Date/Time     04/15/2019 1526    K 4.2 04/15/2019 1526     04/15/2019 1526    CO2 28 04/15/2019 1526    BUN 24 (H) 04/15/2019 1526    CREATININE 0.8 04/15/2019 1526        Component Value Date/Time    CALCIUM 9.8 04/15/2019 1526    ALKPHOS 96 04/15/2019 1526    AST 17 04/15/2019 1526    ALT 17 04/15/2019 1526    BILITOT <0.2 04/15/2019 1526             I thank you for giving me the opportunity to be involved in [facility] [address] care and I look forward following Elva Recinos along with you. If you have any questions or concerns please feel free to contact me at any time.     Duke Paulino MD 02/03/20

## 2020-02-07 RX ORDER — HYDROCHLOROTHIAZIDE 12.5 MG/1
12.5 TABLET ORAL DAILY
Qty: 90 TABLET | Refills: 2 | Status: SHIPPED | OUTPATIENT
Start: 2020-02-07 | End: 2020-10-08 | Stop reason: SDUPTHER

## 2020-02-20 RX ORDER — CLONAZEPAM 0.5 MG/1
TABLET ORAL
Qty: 90 TABLET | Refills: 0 | Status: SHIPPED | OUTPATIENT
Start: 2020-02-20 | End: 2020-04-23

## 2020-04-06 ENCOUNTER — TELEPHONE (OUTPATIENT)
Dept: INTERNAL MEDICINE CLINIC | Age: 60
End: 2020-04-06

## 2020-04-17 ENCOUNTER — OFFICE VISIT (OUTPATIENT)
Dept: PRIMARY CARE CLINIC | Age: 60
End: 2020-04-17
Payer: COMMERCIAL

## 2020-04-17 ENCOUNTER — VIRTUAL VISIT (OUTPATIENT)
Dept: INTERNAL MEDICINE CLINIC | Age: 60
End: 2020-04-17
Payer: COMMERCIAL

## 2020-04-17 ENCOUNTER — TELEPHONE (OUTPATIENT)
Dept: PRIMARY CARE CLINIC | Age: 60
End: 2020-04-17

## 2020-04-17 VITALS — OXYGEN SATURATION: 94 % | HEART RATE: 90 BPM

## 2020-04-17 VITALS — HEIGHT: 65 IN | BODY MASS INDEX: 25.66 KG/M2 | WEIGHT: 154 LBS

## 2020-04-17 PROCEDURE — 99442 PR PHYS/QHP TELEPHONE EVALUATION 11-20 MIN: CPT | Performed by: INTERNAL MEDICINE

## 2020-04-17 PROCEDURE — 99213 OFFICE O/P EST LOW 20 MIN: CPT | Performed by: NURSE PRACTITIONER

## 2020-04-17 RX ORDER — ZINC SULFATE 50(220)MG
50 CAPSULE ORAL DAILY
COMMUNITY

## 2020-04-17 ASSESSMENT — PATIENT HEALTH QUESTIONNAIRE - PHQ9
1. LITTLE INTEREST OR PLEASURE IN DOING THINGS: 0
2. FEELING DOWN, DEPRESSED OR HOPELESS: 0
SUM OF ALL RESPONSES TO PHQ QUESTIONS 1-9: 0
SUM OF ALL RESPONSES TO PHQ9 QUESTIONS 1 & 2: 0
SUM OF ALL RESPONSES TO PHQ QUESTIONS 1-9: 0

## 2020-04-17 ASSESSMENT — ENCOUNTER SYMPTOMS
ABDOMINAL PAIN: 0
NAUSEA: 0
VOMITING: 0
CHEST TIGHTNESS: 0
SHORTNESS OF BREATH: 0

## 2020-04-17 NOTE — PROGRESS NOTES
MD Marcelle   tretinoin (RETIN-A) 0.05 % cream Apply a pea sized amount to the face QHS Yes Kimberly Mart MD   atorvastatin (LIPITOR) 20 MG tablet Take 1 tablet by mouth daily Yes Eduin Garduno MD   fluocinolone (DERMA-SMOOTHE/FS SCALP) 0.01 % external oil Apply topically at bedtime with cap as directed. Wash in the morning. Yes Kimberly Mart MD   SORILUX 0.005 % FOAM Apply daily to the scalp for psoriasis. Yes Kimberly Mart MD   meloxicam (MOBIC) 15 MG tablet Take 1 tablet by mouth daily TAKE 1 TABLET DAILY Yes Eduin Garduno MD   lisinopril (PRINIVIL;ZESTRIL) 20 MG tablet Take 1 tablet by mouth daily Yes Eduin Garduno MD   Etanercept 50 MG/ML SOAJ Inject 50 mg sc once a week- same of the week Yes Faiza Butler MD   clobetasol (TEMOVATE) 0.05 % ointment Apply to affected area BID PRN flares Yes Kimberly Mart MD   clobetasol (TEMOVATE) 0.05 % external solution Apply to affected area BID PRN flares Yes Kimberly Mart MD   CLOBEX SPRAY 0.05 % LIQD Apply to scalp daily - bid prn flares. Yes Kimberly Mart MD   Estradiol (YUVAFEM) 10 MCG TABS vaginal tablet INSERT 1 TABLET VAGINALLY TWICE WEEKLY Yes Historical Provider, MD   fluticasone (FLONASE) 50 MCG/ACT nasal spray 1 spray by Nasal route daily Yes Historical Provider, MD   Ascorbic Acid (VITAMIN C) 500 MG tablet Take 1,000 mg by mouth daily. Yes Historical Provider, MD   Cholecalciferol (VITAMIN D) 1000 UNIT CAPS Take 1 capsule by mouth daily. Yes Historical Provider, MD   Flaxseed, Linseed, (FLAXSEED OIL) 1000 MG CAPS Take  by mouth. Yes Historical Provider, MD   Evangelical Cheri Tea 250 MG CAPS Take  by mouth daily. Yes Historical Provider, MD   PROBIOTIC CAPS Take  by mouth daily. Yes Historical Provider, MD       Social History     Tobacco Use    Smoking status: Never Smoker    Smokeless tobacco: Never Used   Substance Use Topics    Alcohol use:  Yes    Drug use: No        Past Medical History:

## 2020-04-17 NOTE — TELEPHONE ENCOUNTER
Your patient was seen at [facility] and was tested for COVID-19. It is recommended that the PCP follow up with the patient through a Virtual Visit. Thank you.

## 2020-04-19 LAB
SARS-COV-2: NOT DETECTED
SOURCE: NORMAL

## 2020-04-20 ENCOUNTER — TELEPHONE (OUTPATIENT)
Dept: ADMINISTRATIVE | Age: 60
End: 2020-04-20

## 2020-04-23 RX ORDER — CLONAZEPAM 0.5 MG/1
TABLET ORAL
Qty: 90 TABLET | Refills: 0 | Status: SHIPPED | OUTPATIENT
Start: 2020-04-23 | End: 2020-08-13 | Stop reason: SDUPTHER

## 2020-04-23 RX ORDER — MELOXICAM 15 MG/1
TABLET ORAL
Qty: 90 TABLET | Refills: 2 | Status: SHIPPED | OUTPATIENT
Start: 2020-04-23 | End: 2020-11-16

## 2020-04-26 ENCOUNTER — CARE COORDINATION (OUTPATIENT)
Dept: OTHER | Facility: CLINIC | Age: 60
End: 2020-04-26

## 2020-04-30 ENCOUNTER — CARE COORDINATION (OUTPATIENT)
Dept: CARE COORDINATION | Age: 60
End: 2020-04-30

## 2020-04-30 ENCOUNTER — CARE COORDINATION (OUTPATIENT)
Dept: OTHER | Facility: CLINIC | Age: 60
End: 2020-04-30

## 2020-04-30 ENCOUNTER — TELEPHONE (OUTPATIENT)
Dept: INTERNAL MEDICINE CLINIC | Age: 60
End: 2020-04-30

## 2020-04-30 ENCOUNTER — OFFICE VISIT (OUTPATIENT)
Dept: PRIMARY CARE CLINIC | Age: 60
End: 2020-04-30
Payer: COMMERCIAL

## 2020-04-30 VITALS — TEMPERATURE: 98.1 F

## 2020-04-30 PROCEDURE — 99213 OFFICE O/P EST LOW 20 MIN: CPT | Performed by: NURSE PRACTITIONER

## 2020-04-30 NOTE — PROGRESS NOTES
FLU/COVID-19 CLINIC EVALUATION  HPI:  Symptom duration, days:  [] 1   [] 2    []3   [] 4    [x]5    []6   [] 7    []8    []9   [] 10    []11 []  12   [] 13    []14 or greater    Vitals:    04/30/20 1233   Temp: 98.1 °F (36.7 °C)           Review of Systems   All other systems reviewed and are negative. Symptom course:  []Worsening        []Stable       [] Improving  [x]Fevers  Symptom (not measured)  Measured (Result: )  []Chills  [x]Cough  []Coughing up blood  []Chest Congestion  []Nasal Congestion  []Feeling short of breath  []Sometimes  []Frequently  []All the time  []Chest pain  [x]Headaches  []Tolerable  []Severe  []Sore throat  [x]Muscle aches  []Nausea  []Vomiting  []Unable to keep fluids down  []Diarrhea  []Severe  OTHER SYMPTOMS:      RISK FACTORS:  []Pregnant or possibly pregnant  []Age over 60  []Diabetes  []Heart disease  []Asthma  []COPD/Other chronic lung diseases  []Active Cancer  []On Chemotherapy  []Taking oral steroids  []History Lymphoma/Leukemia  []Close contact with a lab confirmed COVID-19 patient within 14 days of symptom onset  []History of travel from affected geographical areas within 14 days of symptom onset  []Health Care Worker Exposure no symptoms  []Health Care Worker Exposure symptomatic    Assessment :  [x]Alert      [x]Oriented to person/place/time  [x]No apparent distress      []Toxic appearing  [x]Breathing appears normal   []Appears tachypneic      [x]Speaking in full sentence    Physical Exam  Vitals signs reviewed. Constitutional:       Appearance: Normal appearance. HENT:      Head: Normocephalic and atraumatic. Mouth/Throat:      Mouth: Mucous membranes are moist.   Neck:      Musculoskeletal: Normal range of motion. Cardiovascular:      Rate and Rhythm: Normal rate. Pulses: Normal pulses. Pulmonary:      Effort: Pulmonary effort is normal.   Musculoskeletal: Normal range of motion. Skin:     General: Skin is warm and dry.    Neurological:      General: No focal deficit present. Mental Status: She is alert. Mental status is at baseline. Test ordered:    [x]COVID    _________  []Strep    ___________  []Flu       _________    Diagnosis:     []Flu  []Strep Throat  []Uncertain Viral Respiratory Illness  [x]Possible COVID-19  []Exposure [id number]  []Other    PLAN:  []Referred to emergency department/ respiratory dept. for evaluation      Discharge home with verbal and or written instructions for:  [x]Preventing the spread of Coronavirus   []Flu management  []Strep throat management  [x]Possible COVID-19 exposure with self-quarantine, isolation instructions and management of symptoms  [x]Follow-up with primary care physician or emergency department if worsens  []Evaluation per physician/nurse practitioner in clinic      1. Suspected COVID-19 virus infection  COVID-19 swab complete at clinic and sent to lab for testing. Quarantine order in place, patient verbalized understanding.    - COVID-19 Ambulatory;  Future

## 2020-04-30 NOTE — TELEPHONE ENCOUNTER
Pt states she is still not feeling better. Pt states she still has the sore throat and coughing but no fever or body pains.     Pt requesting to speak with Dr. Allan Adams about her continue symptoms

## 2020-04-30 NOTE — CARE COORDINATION
Yellow alert noted in remote COVID-19 Loop Symptom Monitoring Program. Messaged patient to notify Sixto Martinez if symptoms have worsened since yesterday. ALERT, triggered 3 minutes ago  Cough is concerning. Alert triggered on Apr 30, 3:14 PM     Monitored by Clemente Goldmann on Apr 30, 3:17 PM until May 1, 3:17 PM  Dismiss  Contributing signs and symptoms    Day 13 - April 30    Clemente Goldmann, RN, 3:18 PM  Thank you for reaching out to us. Please let us know if your cough is worse than yesterday or if you wish to speak to a nurse. We will be available between 8 am-4 pm M-F (9-1 on weekends).  Francine Wiley, Associate Care Manager

## 2020-05-01 ENCOUNTER — TELEPHONE (OUTPATIENT)
Dept: INTERNAL MEDICINE CLINIC | Age: 60
End: 2020-05-01

## 2020-05-01 RX ORDER — DOXYCYCLINE HYCLATE 100 MG
100 TABLET ORAL 2 TIMES DAILY
Qty: 10 TABLET | Refills: 0 | Status: SHIPPED | OUTPATIENT
Start: 2020-05-01 | End: 2020-05-06

## 2020-05-01 NOTE — TELEPHONE ENCOUNTER
Spoke to pt gave advise pt stated she has tried Augmentin and it does not help at all. Wants to know if there something stronger?

## 2020-05-03 LAB
SARS-COV-2: NOT DETECTED
SOURCE: NORMAL

## 2020-05-08 RX ORDER — LISINOPRIL 20 MG/1
20 TABLET ORAL DAILY
Qty: 90 TABLET | Refills: 2 | Status: SHIPPED | OUTPATIENT
Start: 2020-05-08 | End: 2020-10-08 | Stop reason: SDUPTHER

## 2020-06-12 ENCOUNTER — VIRTUAL VISIT (OUTPATIENT)
Dept: RHEUMATOLOGY | Age: 60
End: 2020-06-12
Payer: COMMERCIAL

## 2020-06-12 PROCEDURE — 99214 OFFICE O/P EST MOD 30 MIN: CPT | Performed by: INTERNAL MEDICINE

## 2020-06-12 NOTE — PROGRESS NOTES
other review of systems are negative. ADLS and recreational activities are normal.         Past Medical History:   Diagnosis Date    Actinic keratosis 2010    Allergic rhinitis     Anxiety 2010    Cancer ([facility] [address]     ovarian    Chronic idiopathic constipation 2017    Hemifacial spasm     Botox    Hyperlipidemia     Osteoarthritis     Psoriasis     Psoriatic arthritis ([facility] [address]      Past Surgical History:   Procedure Laterality Date    BRAIN SURGERY      for blood vessel abnormality    BREAST REDUCTION SURGERY       SECTION      COLONOSCOPY      FINGER SURGERY  2007    Left index finger     HYSTERECTOMY      INNER EAR SURGERY  2008    repair artery right ear    NEUROMA SURGERY      microvascular reconstruction to fix hemifacial spasm    RHINOPLASTY      SHOULDER SURGERY      left shoulder    AMRITA AND BSO      TOE SURGERY      TONSILLECTOMY      VARICOSE VEIN SURGERY      WRIST SURGERY      right wrist     Person, family history reviewed and updated. Current Outpatient Medications   Medication Sig Dispense Refill    lisinopril (PRINIVIL;ZESTRIL) 20 MG tablet Take 1 tablet by mouth daily 90 tablet 2    clonazePAM (KLONOPIN) 0.5 MG tablet TAKE 1 TABLET DAILY AS     NEEDED FOR ANXIETY FOR UP  TO 90 DAYS 90 tablet 0    meloxicam (MOBIC) 15 MG tablet TAKE 1 TABLET DAILY 90 tablet 2    zinc sulfate (ZINCATE) 220 (50 Zn) MG capsule Take 50 mg by mouth daily      linaclotide (LINZESS) 145 MCG capsule Take 1 capsule by mouth every morning (before breakfast) 90 capsule 2    hydrochlorothiazide (HYDRODIURIL) 12.5 MG tablet Take 1 tablet by mouth daily 90 tablet 2    tretinoin (RETIN-A) 0.05 % cream Apply a pea sized amount to the face QHS 45 g 3    atorvastatin (LIPITOR) 20 MG tablet Take 1 tablet by mouth daily 90 tablet 2    fluocinolone (DERMA-SMOOTHE/FS SCALP) 0.01 % external oil Apply topically at bedtime with cap as directed. Wash in the morning.  120 mL 1    SORILUX 0.005 % FOAM Apply daily to the scalp for psoriasis. 120 g 1    Etanercept 50 MG/ML SOAJ Inject 50 mg sc once a week- same of the week 4 Syringe 5    clobetasol (TEMOVATE) 0.05 % ointment Apply to affected area BID PRN flares 60 g 1    clobetasol (TEMOVATE) 0.05 % external solution Apply to affected area BID PRN flares 50 mL 5    CLOBEX SPRAY 0.05 % LIQD Apply to scalp daily - bid prn flares. 250 mL 3    Estradiol (YUVAFEM) 10 MCG TABS vaginal tablet INSERT 1 TABLET VAGINALLY TWICE WEEKLY      fluticasone (FLONASE) 50 MCG/ACT nasal spray 1 spray by Nasal route daily      Ascorbic Acid (VITAMIN C) 500 MG tablet Take 1,000 mg by mouth daily.  Cholecalciferol (VITAMIN D) 1000 UNIT CAPS Take 1 capsule by mouth daily.  Flaxseed, Linseed, (FLAXSEED OIL) 1000 MG CAPS Take  by mouth.  Green Tea 250 MG CAPS Take  by mouth daily.  PROBIOTIC CAPS Take  by mouth daily. No current facility-administered medications for this visit. Allergies   Allergen Reactions    Sulfa Antibiotics Rash    Codeine          OBJECTIVE  Physical    There were no vitals filed for this visit. General appearance/psychiatric: Well nourished and well groomed, normal judgment. Alert, appears stated age and cooperative. MKS: She does not have any tender, swollen or inflamed joints to show in the virtual visit. Mild subjective muscle stiffness in her shoulder blade and neck area. 28 joint JOINT COUNT:28 joint count 0                               Right                                                  Left   Swell Tender Swell Tender   PIP1           PIP2           PIP3           PIP4          PIP5          MCP1           MCP2           MCP3           MCP4           MCP5           Wrist           Elbow           Shoulder          Knee             Normal gait. No dactylitis or enthesitis. Skin: no rashes today. Denies any skin thickening or digital ischemia. Scalp psoriasis is also resolved.

## 2020-06-24 ENCOUNTER — TELEPHONE (OUTPATIENT)
Dept: DERMATOLOGY | Age: 60
End: 2020-06-24

## 2020-06-24 RX ORDER — CLOBETASOL PROPIONATE 0.46 MG/ML
SOLUTION TOPICAL
Qty: 50 ML | Refills: 5 | Status: CANCELLED | OUTPATIENT
Start: 2020-06-24

## 2020-06-24 RX ORDER — CLOBETASOL PROPIONATE 0.46 MG/ML
SOLUTION TOPICAL
Qty: 50 ML | Refills: 5 | Status: SHIPPED | OUTPATIENT
Start: 2020-06-24 | End: 2020-10-06 | Stop reason: SDUPTHER

## 2020-09-14 RX ORDER — ETANERCEPT 50 MG/ML
SOLUTION SUBCUTANEOUS
Qty: 2 ML | Refills: 2 | Status: SHIPPED | OUTPATIENT
Start: 2020-09-14 | End: 2021-01-12

## 2020-09-22 ENCOUNTER — OFFICE VISIT (OUTPATIENT)
Dept: RHEUMATOLOGY | Age: 60
End: 2020-09-22
Payer: COMMERCIAL

## 2020-09-22 VITALS — HEIGHT: 65 IN | TEMPERATURE: 97.1 F | WEIGHT: 154 LBS | BODY MASS INDEX: 25.66 KG/M2

## 2020-09-22 PROCEDURE — 99214 OFFICE O/P EST MOD 30 MIN: CPT | Performed by: INTERNAL MEDICINE

## 2020-09-22 NOTE — PROGRESS NOTES
[address] Yang MD                                                                                                [phone] (O) [phone] (F)      Primary provider: Arlen Medeiros MD  Patient identification: Kathi Hammonds: 1960,Sex: female         ASSESSMENT/PLAN:    Radha Weller was seen today for follow-up. Diagnoses and all orders for this visit:    Psoriatic arthritis ([facility] [address]    Psoriasis    High risk medication use    Primary osteoarthritis involving multiple joints      Ovarian cancer 2003- mucinous adenocarcinoma, surgery, Chemo- cancer free- follows up with oncologist.  Onset at the age of 24- started with Dactylitis, then progressed to wide spread inflammatory arthritis. Started with Medrol, Sulfasalazine, Enbrel-early 2000's. A/P Today's visit-  Osteoarthritis finger joints mainly right fifth finger DIP and PIP as well as right hand CMC joints. Psoriatic arthritis is in remission. Psoriasis doing well except intermittent lesions in her scalp. Is on Enbrel 50 mg weekly monotherapy and meloxicam for osteoarthritis. Plan-  Due for labs, will check CBC D, chemistry and lipid panel. Continue current medications. No limitation in activities, activities as tolerated. Meloxicam as needed for osteoarthritic pain. Call me with any symptoms of flares. Flu vaccine at her earliest convenience. Follow-up in 4 months. Patient indicates understanding and agrees with the management plan. I reviewed patients medical information and medical history in the medical records. Note is transcribed using voice recognition software. Inadvertent computerized transcription errors may be present. ##################################################################    Subjective: Follow-up for psoriasis, psoriatic arthritis and osteoarthritis.     Interval Noman Stiles tells me that she moved this appointment because of bony swelling of her right fifth finger PIP and DIP joints as well as bony swelling of her thumb-right. Pain is minimal however is concerned about the bony swelling. She has been very active physically lately tells me that she aches here and there but she knows that that some muscle soreness from physical activity. No other swollen, inflamed joints in upper or lower extremities. Psoriasis is doing well in therapy, has few lesions in the scalp. Psoriatic arthritis is in remission. She is tolerating medication well. She was treated for sinus infection couple of months ago, asymptomatic now. All other review of systems are negative. Past Medical History:   Diagnosis Date    Actinic keratosis 2010    Allergic rhinitis     Anxiety 2010    Cancer ([facility] [address]     ovarian    Chronic idiopathic constipation 2017    Hemifacial spasm     Botox    Hyperlipidemia     Osteoarthritis     Psoriasis     Psoriatic arthritis ([facility] [address]      Past Surgical History:   Procedure Laterality Date    BRAIN SURGERY      for blood vessel abnormality    BREAST REDUCTION SURGERY       SECTION      COLONOSCOPY  12    FINGER SURGERY  2007    Left index finger     HYSTERECTOMY      INNER EAR SURGERY  2008    repair artery right ear    NEUROMA SURGERY      microvascular reconstruction to fix hemifacial spasm    RHINOPLASTY      SHOULDER SURGERY      left shoulder    AMRITA AND BSO      TOE SURGERY      TONSILLECTOMY      VARICOSE VEIN SURGERY      WRIST SURGERY      right wrist     Person, family history reviewed and updated. Current Outpatient Medications   Medication Sig Dispense Refill    Etanercept (ENBREL SURECLICK) 50 MG/ML SOAJ INJECT 1 PEN UNDER THE SKIN EVERY 7 DAYS. 2 mL 2    clonazePAM (KLONOPIN) 0.5 MG tablet Take 1 tablet by mouth daily as needed for Anxiety for up to 60 days.  60 tablet 0    clobetasol (TEMOVATE) 0.05 % external solution Apply to affected area BID PRN flares 50 mL 5    lisinopril (PRINIVIL;ZESTRIL) 20 MG tablet Take 1 tablet by mouth daily 90 tablet 2    meloxicam (MOBIC) 15 MG tablet TAKE 1 TABLET DAILY 90 tablet 2    zinc sulfate (ZINCATE) 220 (50 Zn) MG capsule Take 50 mg by mouth daily      linaclotide (LINZESS) 145 MCG capsule Take 1 capsule by mouth every morning (before breakfast) 90 capsule 2    hydrochlorothiazide (HYDRODIURIL) 12.5 MG tablet Take 1 tablet by mouth daily 90 tablet 2    tretinoin (RETIN-A) 0.05 % cream Apply a pea sized amount to the face QHS 45 g 3    atorvastatin (LIPITOR) 20 MG tablet Take 1 tablet by mouth daily 90 tablet 2    fluocinolone (DERMA-SMOOTHE/FS SCALP) 0.01 % external oil Apply topically at bedtime with cap as directed. Wash in the morning. 120 mL 1    SORILUX 0.005 % FOAM Apply daily to the scalp for psoriasis. 120 g 1    clobetasol (TEMOVATE) 0.05 % ointment Apply to affected area BID PRN flares 60 g 1    CLOBEX SPRAY 0.05 % LIQD Apply to scalp daily - bid prn flares. 250 mL 3    Estradiol (YUVAFEM) 10 MCG TABS vaginal tablet INSERT 1 TABLET VAGINALLY TWICE WEEKLY      fluticasone (FLONASE) 50 MCG/ACT nasal spray 1 spray by Nasal route daily      Ascorbic Acid (VITAMIN C) 500 MG tablet Take 1,000 mg by mouth daily.  Cholecalciferol (VITAMIN D) 1000 UNIT CAPS Take 1 capsule by mouth daily.  Flaxseed, Linseed, (FLAXSEED OIL) 1000 MG CAPS Take  by mouth.  Green Tea 250 MG CAPS Take  by mouth daily.  PROBIOTIC CAPS Take  by mouth daily. No current facility-administered medications for this visit. Allergies   Allergen Reactions    Sulfa Antibiotics Rash    Codeine          OBJECTIVE  Physical    Vitals:    09/22/20 0800   Temp: 97.1 °F (36.2 °C)       General appearance/psychiatric: Well nourished and well groomed, normal judgment. Alert, appears stated age and cooperative.    MKS:     28 joint JOINT COUNT:28 joint count 0                               Right

## 2020-10-06 ENCOUNTER — OFFICE VISIT (OUTPATIENT)
Dept: DERMATOLOGY | Age: 60
End: 2020-10-06
Payer: COMMERCIAL

## 2020-10-06 VITALS — TEMPERATURE: 97.3 F

## 2020-10-06 PROCEDURE — 99214 OFFICE O/P EST MOD 30 MIN: CPT | Performed by: DERMATOLOGY

## 2020-10-06 PROCEDURE — 17110 DESTRUCTION B9 LES UP TO 14: CPT | Performed by: DERMATOLOGY

## 2020-10-06 PROCEDURE — 11104 PUNCH BX SKIN SINGLE LESION: CPT | Performed by: DERMATOLOGY

## 2020-10-06 RX ORDER — FLUOCINOLONE ACETONIDE 0.11 MG/ML
OIL TOPICAL
Qty: 120 ML | Refills: 5 | Status: SHIPPED | OUTPATIENT
Start: 2020-10-06 | End: 2021-11-08 | Stop reason: SDUPTHER

## 2020-10-06 RX ORDER — CLOBETASOL PROPIONATE 0.46 MG/ML
SOLUTION TOPICAL
Qty: 50 ML | Refills: 5 | Status: SHIPPED | OUTPATIENT
Start: 2020-10-06 | End: 2021-02-11

## 2020-10-06 RX ORDER — CALCIPOTRIENE 50 UG/G
AEROSOL, FOAM TOPICAL
Qty: 120 G | Refills: 5 | Status: SHIPPED | OUTPATIENT
Start: 2020-10-06 | End: 2021-11-02

## 2020-10-06 NOTE — PROGRESS NOTES
[facility]  Narendra Rangel MD  [phone]      Sebastian Paige  1960    64 y.o. female     Date of Visit: 10/6/2020    Last seen: 5-2019    Chief Complaint: moles/lesions  Chief Complaint   Patient presents with    Follow-up     TBSE, mole on ride side of face     History of Present Illness:    1. Mult nevi - here for full skin check; no new concerns or changing lesions. 2. F/u for AKs - no new concerns or probs with previous sites. 3. Hx of psoriasis, mainly scalp (on Enbrel for PsA arthritis) - sees Dr. Hernandez Neither now; well-controlled with clobex spray as needed flares on the scalp and clobetasol oint prn flares elsewhere. She has been flaring on the occipital scalp and a few lesions on the dsital LE's currently. Added Sorilux foam for recent scalp flares and this is helped some but still is not clear. 4.  S/p bx of esion on the left upper back at last visit -r ead as mod dys polastic nevus. Has recurrence of pigment here. Is asx.   5. She has a few irritated lesions - back and R temple. S/p excision of 2 cysts on the back in 2018 - one had ruptured back in the summer -  Ended up in ED and had I/D with packing and abx - clinda then doxy d/t culture grew staph lugdunensis prior to excision. Hx of ovarian cancer; her sister was diagnosed with breast CA. In the past few years - her sister was BRCA neg. She has had her veins treated. Review of Systems:  Gen: Feels well, good sense of health. Skin: No changing moles or lesions. Past Medical History, Family History, Surgical History, Medications and Allergies reviewed. Outpatient Medications Marked as Taking for the 10/6/20 encounter (Office Visit) with Eloisa Argueta MD   Medication Sig Dispense Refill    Etanercept (ENBREL SURECLICK) 50 MG/ML SOAJ INJECT 1 PEN UNDER THE SKIN EVERY 7 DAYS.  2 mL 2    clonazePAM (KLONOPIN) 0.5 MG tablet Take 1 tablet by mouth daily as needed for Anxiety for up to 60 days. 60 tablet 0    clobetasol (TEMOVATE) 0.05 % external solution Apply to affected area BID PRN flares 50 mL 5    lisinopril (PRINIVIL;ZESTRIL) 20 MG tablet Take 1 tablet by mouth daily 90 tablet 2    meloxicam (MOBIC) 15 MG tablet TAKE 1 TABLET DAILY 90 tablet 2    zinc sulfate (ZINCATE) 220 (50 Zn) MG capsule Take 50 mg by mouth daily      linaclotide (LINZESS) 145 MCG capsule Take 1 capsule by mouth every morning (before breakfast) 90 capsule 2    hydrochlorothiazide (HYDRODIURIL) 12.5 MG tablet Take 1 tablet by mouth daily 90 tablet 2    tretinoin (RETIN-A) 0.05 % cream Apply a pea sized amount to the face QHS 45 g 3    atorvastatin (LIPITOR) 20 MG tablet Take 1 tablet by mouth daily 90 tablet 2    fluocinolone (DERMA-SMOOTHE/FS SCALP) 0.01 % external oil Apply topically at bedtime with cap as directed. Wash in the morning. 120 mL 1    SORILUX 0.005 % FOAM Apply daily to the scalp for psoriasis. 120 g 1    clobetasol (TEMOVATE) 0.05 % ointment Apply to affected area BID PRN flares 60 g 1    CLOBEX SPRAY 0.05 % LIQD Apply to scalp daily - bid prn flares. 250 mL 3    Estradiol (YUVAFEM) 10 MCG TABS vaginal tablet INSERT 1 TABLET VAGINALLY TWICE WEEKLY      fluticasone (FLONASE) 50 MCG/ACT nasal spray 1 spray by Nasal route daily      Ascorbic Acid (VITAMIN C) 500 MG tablet Take 1,000 mg by mouth daily.  Cholecalciferol (VITAMIN D) 1000 UNIT CAPS Take 1 capsule by mouth daily.  Flaxseed, Linseed, (FLAXSEED OIL) 1000 MG CAPS Take  by mouth.  Green Tea 250 MG CAPS Take  by mouth daily.  PROBIOTIC CAPS Take  by mouth daily.        Allergies   Allergen Reactions    Sulfa Antibiotics Rash    Codeine        Past Medical History:   Diagnosis Date    Actinic keratosis 6/28/2010    Allergic rhinitis     Anxiety 6/28/2010    Cancer ([facility] [address]     ovarian    Chronic idiopathic constipation 1/19/2017    Hemifacial spasm     Botox    Hyperlipidemia     Osteoarthritis     Psoriasis  Psoriatic arthritis ([facility] [address]      Past Surgical History:   Procedure Laterality Date    BRAIN SURGERY      for blood vessel abnormality    BREAST REDUCTION SURGERY       SECTION      COLONOSCOPY      FINGER SURGERY  2007    Left index finger     HYSTERECTOMY      INNER EAR SURGERY  2008    repair artery right ear    NEUROMA SURGERY      microvascular reconstruction to fix hemifacial spasm    RHINOPLASTY      SHOULDER SURGERY      left shoulder    AMRITA AND BSO      TOE SURGERY      TONSILLECTOMY      VARICOSE VEIN SURGERY      WRIST SURGERY      right wrist       Physical Examination     Gen, well-appearing  The following were examined and determined to be normal: Psych/Neuro, Head/face, Conjunctivae/eyelids, Gums/teeth/lips, Neck, Breast/axilla/chest, Abdomen, Back, RUE, LUE, Nails/digits and buttocks. The following were examined and determined to be abnormal: Scalp/hair, RLE and LLE.     trunk and extremities with scattered brown macules and papules   No AK's  Occipital scalp with mild scaly patches  Left upper back, near linear scar from cyst removal- dark brown irregular angulated macule  R shoulder and R temple with stuck-on dull-surfaced brown papules    Assessment and Plan     1. Benign-appearing nevi  2. Hx AK's - no new concerns  - educ re ABCD's of MM   educ sun protection   encouraged skin check yearly (sooner if indicated), self checks    3. Psoriasis - flaring on the scalp and focal macules on the legs, 2% BSA  - clobex spray/soln for the scalp prn flares; ed se/misuse  - clobetasol oint daily - bid prn flares; ed se/misuse  - cont soriulux for the scalp  - can add dermasmoothe oil prn flares  - cont Enbrel per rheum - no skin side effects    4. Moderately dysplastic nevus - L upper back near scar from cyst - removed  - Recurrent  - Punch biopsy performed after verbal consent obtained.  Patient educated regarding risk of bleeding, infection, scar and educated on wound care.   Skin cleansed with alcohol pad and site anesthetized with lido + epi. Aluminum chloride applied to site for hemostasis if necessary and sutures placed. Petrolatum ointment and bandage applied. Specimen bottle labeled with patient information and site and specimen sent to dermpath. 5. ISK - R shoulder and R temple  - 2 lesion(s) treated with liquid nitrogen x 2 cycles. Patient educated on risk of blister, hypopigmentation/scar and wound care.

## 2020-10-06 NOTE — PATIENT INSTRUCTIONS
Biopsy Wound Care Instructions    · Keep the bandage in place for 24 hours. · Cleanse the wound with mild soapy water daily   Gently dry the area.  Apply Vaseline or petroleum jelly to the wound using a cotton tipped applicator.  Cover with a clean bandage.  Repeat this process until the biopsy site is healed.  If you had stitches placed, continue treating the site until the stitches are removed. Remember to make an appointment to return to have your stitches removed by our staff.  You may shower and bathe as usual.       ** Biopsy results generally take around 7 business days to come back. If you have not heard from us by then, please call the office at [phone] between 8AM and 4PM Monday through Friday. Cryosurgery (Freezing) Wound Care Instructions    AFTER THE PROCEDURE:    You will notice swelling and redness around the site. This is normal.    You may experience a sharp or sore feeling for the next several days. For this discomfort, you may take acetaminophen (Tylenol©).  A blister may develop at the treated area, sometimes as soon as by the end of the day. After several days, the blister will subside and a scab will form.  If the area is bumped or traumatized during the first few days following freezing, you may develop bleeding into the blister, forming a blood blister. This is nothing to be alarmed about.  If the blister is tense, uncomfortable, or much larger than the site that was frozen, you may pop the blister along its edge with a sterile needle (boiled, heated under a flame, or cleaned with alcohol) to allow the fluid to drain out. If the blister does not bother you, no treatment is needed.  Do NOT peel off the top of the blister roof. It will act as a dressing on top of your wound. WOUND CARE:    You may shower or bathe as usual, but avoid scrubbing the areas that have been frozen.      Cleanse the site twice a day with mild soapy water, and then apply a thin

## 2020-10-09 LAB — DERMATOLOGY PATHOLOGY REPORT: NORMAL

## 2020-10-09 RX ORDER — LISINOPRIL 20 MG/1
20 TABLET ORAL DAILY
Qty: 90 TABLET | Refills: 0 | Status: SHIPPED | OUTPATIENT
Start: 2020-10-09 | End: 2020-12-28

## 2020-10-09 RX ORDER — HYDROCHLOROTHIAZIDE 12.5 MG/1
12.5 TABLET ORAL DAILY
Qty: 90 TABLET | Refills: 0 | Status: SHIPPED | OUTPATIENT
Start: 2020-10-09 | End: 2022-04-06

## 2020-11-16 RX ORDER — MELOXICAM 15 MG/1
TABLET ORAL
Qty: 90 TABLET | Refills: 3 | Status: SHIPPED | OUTPATIENT
Start: 2020-11-16 | End: 2022-04-18 | Stop reason: SDUPTHER

## 2020-11-20 LAB
A/G RATIO: 1.9 (ref 1.1–2.2)
ALBUMIN SERPL-MCNC: 4.4 G/DL (ref 3.4–5)
ALP BLD-CCNC: 101 U/L (ref 40–129)
ALT SERPL-CCNC: 22 U/L (ref 10–40)
ANION GAP SERPL CALCULATED.3IONS-SCNC: 11 MMOL/L (ref 3–16)
AST SERPL-CCNC: 18 U/L (ref 15–37)
BASOPHILS ABSOLUTE: 0.1 K/UL (ref 0–0.2)
BASOPHILS RELATIVE PERCENT: 1.1 %
BILIRUB SERPL-MCNC: 0.3 MG/DL (ref 0–1)
BUN BLDV-MCNC: 26 MG/DL (ref 7–20)
CALCIUM SERPL-MCNC: 9.5 MG/DL (ref 8.3–10.6)
CHLORIDE BLD-SCNC: 104 MMOL/L (ref 99–110)
CHOLESTEROL, TOTAL: 191 MG/DL (ref 0–199)
CO2: 27 MMOL/L (ref 21–32)
CREAT SERPL-MCNC: 0.7 MG/DL (ref 0.6–1.1)
EOSINOPHILS ABSOLUTE: 0.1 K/UL (ref 0–0.6)
EOSINOPHILS RELATIVE PERCENT: 1.8 %
GFR AFRICAN AMERICAN: >60
GFR NON-AFRICAN AMERICAN: >60
GLOBULIN: 2.3 G/DL
GLUCOSE BLD-MCNC: 98 MG/DL (ref 70–99)
HCT VFR BLD CALC: 42.7 % (ref 36–48)
HDLC SERPL-MCNC: 43 MG/DL (ref 40–60)
HEMOGLOBIN: 13.9 G/DL (ref 12–16)
LDL CHOLESTEROL CALCULATED: 99 MG/DL
LYMPHOCYTES ABSOLUTE: 2.1 K/UL (ref 1–5.1)
LYMPHOCYTES RELATIVE PERCENT: 33.2 %
MCH RBC QN AUTO: 26.6 PG (ref 26–34)
MCHC RBC AUTO-ENTMCNC: 32.6 G/DL (ref 31–36)
MCV RBC AUTO: 81.5 FL (ref 80–100)
MONOCYTES ABSOLUTE: 0.6 K/UL (ref 0–1.3)
MONOCYTES RELATIVE PERCENT: 9.9 %
NEUTROPHILS ABSOLUTE: 3.4 K/UL (ref 1.7–7.7)
NEUTROPHILS RELATIVE PERCENT: 54 %
PDW BLD-RTO: 13.3 % (ref 12.4–15.4)
PLATELET # BLD: 334 K/UL (ref 135–450)
PMV BLD AUTO: 8.4 FL (ref 5–10.5)
POTASSIUM SERPL-SCNC: 4.5 MMOL/L (ref 3.5–5.1)
RBC # BLD: 5.24 M/UL (ref 4–5.2)
SODIUM BLD-SCNC: 142 MMOL/L (ref 136–145)
TOTAL PROTEIN: 6.7 G/DL (ref 6.4–8.2)
TRIGL SERPL-MCNC: 244 MG/DL (ref 0–150)
VLDLC SERPL CALC-MCNC: 49 MG/DL
WBC # BLD: 6.3 K/UL (ref 4–11)

## 2020-11-21 ENCOUNTER — PATIENT MESSAGE (OUTPATIENT)
Dept: FAMILY MEDICINE CLINIC | Age: 60
End: 2020-11-21

## 2020-11-23 NOTE — TELEPHONE ENCOUNTER
From: Christy Pacheco  To: Leatha Bellamy MD  Sent: 11/21/2020 9:50 AM EST  Subject: Test Results Question    Based on my test results, high BUN and high triglicerides, what do I need to do?

## 2020-11-23 NOTE — TELEPHONE ENCOUNTER
Better diet, drink water 50-60 ounces daily   Make sure pt was screened ffor colon cancer  Recheck BMP in 3-4 months

## 2020-11-23 NOTE — TELEPHONE ENCOUNTER
Patient had colonoscopy in 12/2013 health maintenance suggest next due in 12/2023 Do you want her to have anyhting done before that?

## 2020-12-04 RX ORDER — ATORVASTATIN CALCIUM 20 MG/1
TABLET, FILM COATED ORAL
Qty: 90 TABLET | Refills: 2 | Status: SHIPPED | OUTPATIENT
Start: 2020-12-04 | End: 2021-08-03 | Stop reason: SDUPTHER

## 2020-12-28 RX ORDER — LISINOPRIL 20 MG/1
TABLET ORAL
Qty: 90 TABLET | Refills: 0 | Status: SHIPPED | OUTPATIENT
Start: 2020-12-28 | End: 2021-03-18 | Stop reason: SDUPTHER

## 2020-12-28 RX ORDER — LINACLOTIDE 145 UG/1
CAPSULE, GELATIN COATED ORAL
Qty: 90 CAPSULE | Refills: 2 | Status: SHIPPED | OUTPATIENT
Start: 2020-12-28 | End: 2021-03-04

## 2021-01-12 RX ORDER — ETANERCEPT 50 MG/ML
SOLUTION SUBCUTANEOUS
Qty: 4 ML | Refills: 3 | Status: SHIPPED | OUTPATIENT
Start: 2021-01-12 | End: 2021-05-24

## 2021-01-19 ENCOUNTER — OFFICE VISIT (OUTPATIENT)
Dept: RHEUMATOLOGY | Age: 61
End: 2021-01-19
Payer: COMMERCIAL

## 2021-01-19 VITALS — WEIGHT: 154 LBS | BODY MASS INDEX: 25.66 KG/M2 | HEIGHT: 65 IN | TEMPERATURE: 98.3 F

## 2021-01-19 DIAGNOSIS — L40.9 PSORIASIS: ICD-10-CM

## 2021-01-19 DIAGNOSIS — M15.9 PRIMARY OSTEOARTHRITIS INVOLVING MULTIPLE JOINTS: ICD-10-CM

## 2021-01-19 DIAGNOSIS — Z79.899 HIGH RISK MEDICATION USE: ICD-10-CM

## 2021-01-19 DIAGNOSIS — L40.50 PSORIATIC ARTHRITIS (HCC): Primary | ICD-10-CM

## 2021-01-19 PROCEDURE — 99214 OFFICE O/P EST MOD 30 MIN: CPT | Performed by: INTERNAL MEDICINE

## 2021-01-19 NOTE — PROGRESS NOTES
present. ##################################################################    Subjective: Follow-up for psoriasis, psoriatic arthritis and osteoarthritis. Interval changes-  She is doing fairly well in terms of arthritis osteoarthritis and psoriatic arthritis, no swollen or inflamed joints. Continues to have migratory arthralgias in different joints specially when she try to cut back on meloxicam.  She is able to lead normal life with help of meloxicam and worries that she might not able to function without it. Enbrel has helped with inflammatory arthritis significantly. No dactylitis, enthesitis, low back pain. Skin psoriasis is active mainly in the scalp, followed closely by dermatology. Denies any GI upset or GI bleed. ADLs and recreational activities are normal.   She is tolerating medication well. All other review of systems are negative. Past medical, surgical history, social history allergies Meds reviewed. Current Outpatient Medications   Medication Sig Dispense Refill    ENBREL SURECLICK 50 MG/ML SOAJ INJECT 1 PEN UNDER THE SKIN EVERY 7 DAYS. 4 mL 3    LINZESS 145 MCG capsule TAKE 1 CAPSULE EVERY       MORNING BEFORE BREAKFAST 90 capsule 2    lisinopril (PRINIVIL;ZESTRIL) 20 MG tablet TAKE 1 TABLET DAILY 90 tablet 0    atorvastatin (LIPITOR) 20 MG tablet TAKE 1 TABLET DAILY 90 tablet 2    meloxicam (MOBIC) 15 MG tablet TAKE 1 TABLET DAILY 90 tablet 3    hydroCHLOROthiazide (HYDRODIURIL) 12.5 MG tablet Take 1 tablet by mouth daily 90 tablet 0    clobetasol (TEMOVATE) 0.05 % external solution Apply to affected area BID PRN flares 50 mL 5    fluocinolone (DERMA-SMOOTHE/FS SCALP) 0.01 % external oil Apply topically at bedtime with cap as directed. Wash in the morning. 120 mL 5    SORILUX 0.005 % FOAM Apply daily to the scalp for psoriasis. 120 g 5    clonazePAM (KLONOPIN) 0.5 MG tablet Take 1 tablet by mouth daily as needed for Anxiety for up to 60 days.  60 tablet 0    zinc sulfate (ZINCATE) 220 (50 Zn) MG capsule Take 50 mg by mouth daily      tretinoin (RETIN-A) 0.05 % cream Apply a pea sized amount to the face QHS 45 g 3    clobetasol (TEMOVATE) 0.05 % ointment Apply to affected area BID PRN flares 60 g 1    CLOBEX SPRAY 0.05 % LIQD Apply to scalp daily - bid prn flares. 250 mL 3    Estradiol (YUVAFEM) 10 MCG TABS vaginal tablet INSERT 1 TABLET VAGINALLY TWICE WEEKLY      fluticasone (FLONASE) 50 MCG/ACT nasal spray 1 spray by Nasal route daily      Ascorbic Acid (VITAMIN C) 500 MG tablet Take 1,000 mg by mouth daily.  Cholecalciferol (VITAMIN D) 1000 UNIT CAPS Take 1 capsule by mouth daily.  Flaxseed, Linseed, (FLAXSEED OIL) 1000 MG CAPS Take  by mouth.  Green Tea 250 MG CAPS Take  by mouth daily.  PROBIOTIC CAPS Take  by mouth daily. No current facility-administered medications for this visit. Allergies   Allergen Reactions    Sulfa Antibiotics Rash    Codeine          OBJECTIVE  Physical    Vitals:    01/19/21 0802   Temp: 98.3 °F (36.8 °C)       General appearance/psychiatric: Well nourished and well groomed, normal judgment. Alert, appears stated age and cooperative. MKS:     28 joint JOINT COUNT:28 joint count 0                               Right                                                  Left   Swell Tender Swell Tender   PIP1           PIP2           PIP3           PIP4          PIP5          MCP1           MCP2           MCP3           MCP4           MCP5           Wrist           Elbow           Shoulder          Knee             Than asymptomatic osteoarthritic changes across her finger joints mainly DIPs and PIPs, knees, no appreciable tenderness, swelling, synovitis in other joints. Full range of motion all peripheral joints in upper and lower extremities. Normal gait. No dactylitis or enthesitis. Skin:   Denies any skin thickening or digital ischemia.        Data:  Lab Results   Component Value Date    WBC 6.3 11/20/2020    RBC 5.24 11/20/2020    HGB 13.9 11/20/2020    HCT 42.7 11/20/2020     11/20/2020    MCV 81.5 11/20/2020    MCH 26.6 11/20/2020    MCHC 32.6 11/20/2020    RDW 13.3 11/20/2020    SEGSPCT 55.3 03/15/2013    LYMPHOPCT 33.2 11/20/2020    MONOPCT 9.9 11/20/2020    BASOPCT 1.1 11/20/2020    MONOSABS 0.6 11/20/2020    LYMPHSABS 2.1 11/20/2020    EOSABS 0.1 11/20/2020    BASOSABS 0.1 11/20/2020    DIFFTYPE Auto-K 03/15/2013       Chemistry        Component Value Date/Time     11/20/2020 0757    K 4.5 11/20/2020 0757     11/20/2020 0757    CO2 27 11/20/2020 0757    BUN 26 (H) 11/20/2020 0757    CREATININE 0.7 11/20/2020 0757        Component Value Date/Time    CALCIUM 9.5 11/20/2020 0757    ALKPHOS 101 11/20/2020 0757    AST 18 11/20/2020 0757    ALT 22 11/20/2020 0757    BILITOT 0.3 11/20/2020 0757             I thank you for giving me the opportunity to be involved in [facility] care and I look forward following Keyona Parham along with you. If you have any questions or concerns please feel free to contact me at any time.     Rehana Espitia MD 01/19/21

## 2021-02-11 RX ORDER — CLOBETASOL PROPIONATE 0.46 MG/ML
SOLUTION TOPICAL
Qty: 50 ML | Refills: 5 | Status: SHIPPED | OUTPATIENT
Start: 2021-02-11 | End: 2021-07-27

## 2021-03-04 ENCOUNTER — VIRTUAL VISIT (OUTPATIENT)
Dept: FAMILY MEDICINE CLINIC | Age: 61
End: 2021-03-04
Payer: COMMERCIAL

## 2021-03-04 DIAGNOSIS — F41.9 ANXIETY: ICD-10-CM

## 2021-03-04 DIAGNOSIS — I10 ESSENTIAL HYPERTENSION: Primary | ICD-10-CM

## 2021-03-04 DIAGNOSIS — E78.2 MIXED HYPERLIPIDEMIA: ICD-10-CM

## 2021-03-04 PROCEDURE — 99213 OFFICE O/P EST LOW 20 MIN: CPT | Performed by: INTERNAL MEDICINE

## 2021-03-04 ASSESSMENT — PATIENT HEALTH QUESTIONNAIRE - PHQ9
SUM OF ALL RESPONSES TO PHQ QUESTIONS 1-9: 0
2. FEELING DOWN, DEPRESSED OR HOPELESS: 0
1. LITTLE INTEREST OR PLEASURE IN DOING THINGS: 0
SUM OF ALL RESPONSES TO PHQ9 QUESTIONS 1 & 2: 0

## 2021-03-04 NOTE — PROGRESS NOTES
3/4/2021    TELEHEALTH EVALUATION --Visual (During FGFAP-37 public health emergency)    HPI:    Liu Pruett (:  1960) has requested an audio/video evaluation for the following concern(s):      Pt wanted to reduce does of Linzess due to diarrhea. She does not want to stop completely- she did try I the past and had constipation. P 120/70s. eh is on Lisnopril / HCTZ  No side effects. Pt denies CP/SOB/palpitations/abdominal pain/N/V. Lab Results   Component Value Date    CHOL 191 2020    CHOL 179 2018    CHOL 170 2017     Lab Results   Component Value Date    TRIG 244 (H) 2020    TRIG 145 2018    TRIG 258 (H) 2017     Lab Results   Component Value Date    HDL 43 2020    HDL 53 2018    HDL 46 2017     Lab Results   Component Value Date    LDLCALC 99 2020    LDLCALC 97 2018    LDLCALC 72 2017     Lab Results   Component Value Date    LABVLDL 49 2020    LABVLDL 29 2018    LABVLDL 52 2017   she is on Lipitor. She is going to work on better diet  She does exercise. She is on Clonazepam she got the last refill fro her previous prescribed     Preventive:  1) colon cancer screening completed? yes, due in   2) breast cancer screening completed (or not needed) ? yes, 2020   3) PAP smear completed (or not needed) ? yes,      Review of Systems    Prior to Visit Medications    Medication Sig Taking? Authorizing Provider   linaCLOtide (LINZESS) 72 MCG CAPS capsule Take 1 capsule by mouth every morning (before breakfast) Yes Nish Ibanez MD   clobetasol (TEMOVATE) 0.05 % external solution APPLY TO THE AFFECTED AREA TWICE DAILY AS NEEDED FOR FLARES Yes Bonnie Rodgers MD   ENBREL SURECLICK 50 MG/ML SOAJ INJECT 1 PEN UNDER THE SKIN EVERY 7 DAYS.  Yes Jeana Porter MD   lisinopril (PRINIVIL;ZESTRIL) 20 MG tablet TAKE 1 TABLET DAILY Yes Chris Dunn MD atorvastatin (LIPITOR) 20 MG tablet TAKE 1 TABLET DAILY Yes Chris Dunn MD   meloxicam (MOBIC) 15 MG tablet TAKE 1 TABLET DAILY Yes Jeana Porter MD   hydroCHLOROthiazide (HYDRODIURIL) 12.5 MG tablet Take 1 tablet by mouth daily Yes Chris Dunn MD   fluocinolone (DERMA-SMOOTHE/FS SCALP) 0.01 % external oil Apply topically at bedtime with cap as directed. Wash in the morning. Yes Bonnie Rodgers MD   SORILUX 0.005 % FOAM Apply daily to the scalp for psoriasis. Yes Bonnie Rodgers MD   zinc sulfate (ZINCATE) 220 (50 Zn) MG capsule Take 50 mg by mouth daily Yes Historical Provider, MD   tretinoin (RETIN-A) 0.05 % cream Apply a pea sized amount to the face QHS Yes Bonnie Rodgers MD   clobetasol (TEMOVATE) 0.05 % ointment Apply to affected area BID PRN flares Yes Bonnie Rodgers MD   CLOBEX SPRAY 0.05 % LIQD Apply to scalp daily - bid prn flares. Yes Bonnie Rodgers MD   Estradiol (YUVAFEM) 10 MCG TABS vaginal tablet INSERT 1 TABLET VAGINALLY TWICE WEEKLY Yes Historical Provider, MD   fluticasone (FLONASE) 50 MCG/ACT nasal spray 1 spray by Nasal route daily Yes Historical Provider, MD   Ascorbic Acid (VITAMIN C) 500 MG tablet Take 1,000 mg by mouth daily. Yes Historical Provider, MD   Cholecalciferol (VITAMIN D) 1000 UNIT CAPS Take 1 capsule by mouth daily. Yes Historical Provider, MD   Flaxseed, Linseed, (FLAXSEED OIL) 1000 MG CAPS Take  by mouth. Yes Historical Provider, MD   Margarita Proud Tea 250 MG CAPS Take  by mouth daily. Yes Historical Provider, MD   PROBIOTIC CAPS Take  by mouth daily. Yes Historical Provider, MD   clonazePAM (KLONOPIN) 0.5 MG tablet Take 1 tablet by mouth daily as needed for Anxiety for up to 60 days. Chris Dunn MD       Social History     Tobacco Use    Smoking status: Never Smoker    Smokeless tobacco: Never Used   Substance Use Topics    Alcohol use:  Yes    Drug use: No        Past Medical History:   Diagnosis Date  Actinic keratosis 6/28/2010    Allergic rhinitis     Anxiety 6/28/2010    Cancer (HCC)     ovarian    Chronic idiopathic constipation 1/19/2017    Hemifacial spasm     Botox    Hyperlipidemia     Osteoarthritis     Psoriasis     Psoriatic arthritis (HCC)        PHYSICAL EXAMINATION:  [ INSTRUCTIONS:  \"[x]\" Indicates a positive item  \"[]\" Indicates a negative item  -- DELETE ALL ITEMS NOT EXAMINED]  Vital Signs: (As obtained by patient/caregiver or practitioner observation)    Blood pressure-  Heart rate-    Respiratory rate-    Temperature-  Pulse oximetry-     Constitutional: [x] Appears well-developed and well-nourished [] No apparent distress      [x] Abnormal-   Mental status  [x] Alert and awake  [] Oriented to person/place/time []Able to follow commands      Eyes:  EOM    [x]  Normal  [] Abnormal-  Sclera  [x]  Normal  [] Abnormal -         Discharge []  None visible  [] Abnormal -    HENT:   [x] Normocephalic, atraumatic. [] Abnormal   [] Mouth/Throat: Mucous membranes are moist.     External Ears [x] Normal  [] Abnormal-     Neck: [] No visualized mass     Pulmonary/Chest: [x] Respiratory effort normal.  [] No visualized signs of difficulty breathing or respiratory distress        [] Abnormal-      Musculoskeletal:   [x] Normal gait with no signs of ataxia         [x] Normal range of motion of neck        [] Abnormal-       Neurological:        [x] No Facial Asymmetry (Cranial nerve 7 motor function) (limited exam to video visit)          [x] No gaze palsy        [] Abnormal-         Skin:        [x] No significant exanthematous lesions or discoloration noted on facial skin         [] Abnormal-            Psychiatric:       [x] Normal Affect [x] No Hallucinations        [] Abnormal-     Other pertinent observable physical exam findings-      Assessment/Plan:   Glenis Browning was seen today for other.     Diagnoses and all orders for this visit:    Essential hypertension Well controlled- no changes in medication today. Monitor BP     Mixed hyperlipidemia  Well controlled- no changes in medication today. Anxiety  I have discussed w/ pt: She will need to choose on eprescirber  F/u in 6 months     Other orders  Refill given at lower dose   -     linaCLOtide (LINZESS) 72 MCG CAPS capsule; Take 1 capsule by mouth every morning (before breakfast)    - Patient was encouraged to call the office (and ask to see me) or be seen by other provider for any worsening or lack of improvement of the symptoms or any new symptoms.    - Pt was asked toschedule an appointment in 6 months, and to let me know of any scheduling difficulties. No follow-ups on file. Staci Ugarte, was evaluated through a synchronous (real-time) audio-video encounter. The patient (or guardian if applicable) is aware that this is a billable service. Verbal consent to proceed has been obtained within the past 12 months. The visit was conducted pursuant to the emergency declaration under the [address] [place] [address] authority and the [employer] General Act. Patient identification was verified, and a caregiver was present when appropriate. The patient was located in a state where the provider was credentialed to provide care. Total time spent on this encounter: 11:40 min    --Anju Marie MD on 3/4/2021 at 9:17 AM    An electronic signature was used to authenticate this note.

## 2021-03-18 DIAGNOSIS — I10 ESSENTIAL HYPERTENSION: ICD-10-CM

## 2021-03-18 RX ORDER — LISINOPRIL 20 MG/1
TABLET ORAL
Qty: 90 TABLET | Refills: 3 | Status: SHIPPED | OUTPATIENT
Start: 2021-03-18 | End: 2022-04-18 | Stop reason: SDUPTHER

## 2021-03-18 RX ORDER — LISINOPRIL 20 MG/1
TABLET ORAL
Qty: 90 TABLET | Refills: 0 | OUTPATIENT
Start: 2021-03-18

## 2021-05-03 ENCOUNTER — OFFICE VISIT (OUTPATIENT)
Dept: DERMATOLOGY | Age: 61
End: 2021-05-03
Payer: COMMERCIAL

## 2021-05-03 VITALS — TEMPERATURE: 97.3 F

## 2021-05-03 DIAGNOSIS — Z87.2 HISTORY OF ACTINIC KERATOSES: ICD-10-CM

## 2021-05-03 DIAGNOSIS — L40.9 PSORIASIS: ICD-10-CM

## 2021-05-03 DIAGNOSIS — D22.9 MULTIPLE NEVI: Primary | ICD-10-CM

## 2021-05-03 DIAGNOSIS — L82.1 SEBORRHEIC KERATOSIS: ICD-10-CM

## 2021-05-03 DIAGNOSIS — Z86.018 HISTORY OF DYSPLASTIC NEVUS: ICD-10-CM

## 2021-05-03 PROCEDURE — 99214 OFFICE O/P EST MOD 30 MIN: CPT | Performed by: DERMATOLOGY

## 2021-05-03 NOTE — PROGRESS NOTES
[facility]  Marshal Councilman, MD  [phone]      Dub Dandy  1960    64 y.o. female     Date of Visit: 5/3/2021    Last seen:     Chief Complaint: moles/lesions, f/u psoriasis  Chief Complaint   Patient presents with    Skin Exam     6 mo FSE         HX: multi nevi     History of Present Illness:  *going to a [facility] in [place] [address] this month    1. Mult nevi - here for full skin check; no new concerns or changing lesions. 2. F/u for AKs - no new concerns or probs with previous sites. 3. Hx of psoriasis, mainly scalp (on Enbrel for PsA arthritis) - sees Dr. Maral Osullivan now; fairly well-controlled with clobex spray/soluntion as needed flares on the scalp + sorilux and occasional dermasmoothe and clobetasol oint prn flares elsewhere. She has been flaring on the occipital scalp and a few lesions on the distal LE's currently. Added Sorilux foam more recently for recent scalp flares and this has helped some. 4.  S/p bx of esion on the left upper back at last visit -r ead as mod dys polastic nevus. Had recurrence of pigment here last visit in October 2020 so repeat biopsy was done and read as junctional recurrence of a previous biopsy nevus, no atypia seen. S/p excision of 2 cysts on the back in 2018 - one had ruptured back in the summer -  Ended up in ED and had I/D with packing and abx - clinda then doxy d/t culture grew staph lugdunensis prior to excision. Hx of ovarian cancer; her sister was diagnosed with breast CA. In the past few years - her sister was BRCA neg. She has had her veins treated. Review of Systems:  Gen: Feels well, good sense of health. Skin: No changing moles or lesions. Past Medical History, Family History, Surgical History, Medications and Allergies reviewed.     Outpatient Medications Marked as Taking for the 5/3/21 encounter (Office Visit) with Mary Francisco MD   Medication Sig Dispense Refill    lisinopril (PRINIVIL;ZESTRIL) 20 MG tablet TAKE 1 TABLET DAILY 90 tablet 3    linaCLOtide (LINZESS) 72 MCG CAPS capsule Take 1 capsule by mouth every morning (before breakfast) 90 capsule 3    clobetasol (TEMOVATE) 0.05 % external solution APPLY TO THE AFFECTED AREA TWICE DAILY AS NEEDED FOR FLARES 50 mL 5    ENBREL SURECLICK 50 MG/ML SOAJ INJECT 1 PEN UNDER THE SKIN EVERY 7 DAYS. 4 mL 3    atorvastatin (LIPITOR) 20 MG tablet TAKE 1 TABLET DAILY 90 tablet 2    meloxicam (MOBIC) 15 MG tablet TAKE 1 TABLET DAILY 90 tablet 3    hydroCHLOROthiazide (HYDRODIURIL) 12.5 MG tablet Take 1 tablet by mouth daily 90 tablet 0    fluocinolone (DERMA-SMOOTHE/FS SCALP) 0.01 % external oil Apply topically at bedtime with cap as directed. Wash in the morning. 120 mL 5    SORILUX 0.005 % FOAM Apply daily to the scalp for psoriasis. 120 g 5    clonazePAM (KLONOPIN) 0.5 MG tablet Take 1 tablet by mouth daily as needed for Anxiety for up to 60 days. 60 tablet 0    zinc sulfate (ZINCATE) 220 (50 Zn) MG capsule Take 50 mg by mouth daily      tretinoin (RETIN-A) 0.05 % cream Apply a pea sized amount to the face QHS 45 g 3    clobetasol (TEMOVATE) 0.05 % ointment Apply to affected area BID PRN flares 60 g 1    CLOBEX SPRAY 0.05 % LIQD Apply to scalp daily - bid prn flares. 250 mL 3    Estradiol (YUVAFEM) 10 MCG TABS vaginal tablet INSERT 1 TABLET VAGINALLY TWICE WEEKLY      fluticasone (FLONASE) 50 MCG/ACT nasal spray 1 spray by Nasal route daily      Ascorbic Acid (VITAMIN C) 500 MG tablet Take 1,000 mg by mouth daily.  Cholecalciferol (VITAMIN D) 1000 UNIT CAPS Take 1 capsule by mouth daily.  Flaxseed, Linseed, (FLAXSEED OIL) 1000 MG CAPS Take  by mouth.  Green Tea 250 MG CAPS Take  by mouth daily.  PROBIOTIC CAPS Take  by mouth daily.        Allergies   Allergen Reactions    Sulfa Antibiotics Rash    Codeine        Past Medical History:   Diagnosis Date    Actinic keratosis 6/28/2010    Allergic rhinitis     Anxiety 6/28/2010    Cancer ([facility] [address]     ovarian    Chronic idiopathic constipation 2017    Hemifacial spasm     Botox    Hyperlipidemia     Osteoarthritis     Psoriasis     Psoriatic arthritis ([facility] [address]      Past Surgical History:   Procedure Laterality Date    BRAIN SURGERY      for blood vessel abnormality    BREAST REDUCTION SURGERY       SECTION      COLONOSCOPY  12    FINGER SURGERY  2007    Left index finger     HYSTERECTOMY      INNER EAR SURGERY  2008    repair artery right ear    NEUROMA SURGERY      microvascular reconstruction to fix hemifacial spasm    RHINOPLASTY      SHOULDER SURGERY      left shoulder    AMRITA AND BSO      TOE SURGERY      TONSILLECTOMY      VARICOSE VEIN SURGERY      WRIST SURGERY      right wrist       Physical Examination     Gen, well-appearing  The following were examined and determined to be normal: Psych/Neuro, Head/face, Conjunctivae/eyelids, Gums/teeth/lips, Neck, Breast/axilla/chest, Abdomen, Back, RUE, LUE, Nails/digits and buttocks. The following were examined and determined to be abnormal: Scalp/hair, RLE and LLE.     trunk and extremities with scattered brown macules and papules   No AK's  Occipital scalp with mild scaly patches  Left upper back, near linear scar from cyst removal -scar clear    Assessment and Plan     1. Benign-appearing nevi and few SKs  2. AK's - no new concerns  - educ re si/sx/ABCD's of MM   educ sun protection - OTC sunscreen with SPF 30-50+ recommended and reviewed usage  encouraged skin check yearly (sooner if indicated), self checks    3.  Psoriasis - mild flaring on the scalp and focal macules on the legs, 1-2% BSA  - clobex spray/soln for the scalp prn flares; ed se/misuse  - clobetasol oint daily - bid prn flares; ed se/misuse  - cont soriulux for the scalp  - can add dermasmoothe oil prn flares  - cont Enbrel per rheum - no skin side effects -discussed some alternative treatments may give better skin clearance but may not have as

## 2021-05-18 ENCOUNTER — OFFICE VISIT (OUTPATIENT)
Dept: RHEUMATOLOGY | Age: 61
End: 2021-05-18
Payer: COMMERCIAL

## 2021-05-18 VITALS
WEIGHT: 144 LBS | HEIGHT: 65 IN | BODY MASS INDEX: 23.99 KG/M2 | SYSTOLIC BLOOD PRESSURE: 120 MMHG | DIASTOLIC BLOOD PRESSURE: 78 MMHG

## 2021-05-18 DIAGNOSIS — M15.9 PRIMARY OSTEOARTHRITIS INVOLVING MULTIPLE JOINTS: ICD-10-CM

## 2021-05-18 DIAGNOSIS — L40.50 PSORIATIC ARTHRITIS (HCC): Primary | ICD-10-CM

## 2021-05-18 DIAGNOSIS — Z79.899 HIGH RISK MEDICATION USE: ICD-10-CM

## 2021-05-18 DIAGNOSIS — L40.9 PSORIASIS: ICD-10-CM

## 2021-05-18 LAB
A/G RATIO: 2 (ref 1.1–2.2)
ALBUMIN SERPL-MCNC: 4.7 G/DL (ref 3.4–5)
ALP BLD-CCNC: 160 U/L (ref 40–129)
ALT SERPL-CCNC: 28 U/L (ref 10–40)
ANION GAP SERPL CALCULATED.3IONS-SCNC: 11 MMOL/L (ref 3–16)
AST SERPL-CCNC: 31 U/L (ref 15–37)
BASOPHILS ABSOLUTE: 0.1 K/UL (ref 0–0.2)
BASOPHILS RELATIVE PERCENT: 0.9 %
BILIRUB SERPL-MCNC: <0.2 MG/DL (ref 0–1)
BUN BLDV-MCNC: 24 MG/DL (ref 7–20)
CALCIUM SERPL-MCNC: 10.1 MG/DL (ref 8.3–10.6)
CHLORIDE BLD-SCNC: 105 MMOL/L (ref 99–110)
CO2: 25 MMOL/L (ref 21–32)
CREAT SERPL-MCNC: 0.7 MG/DL (ref 0.6–1.1)
EOSINOPHILS ABSOLUTE: 0.1 K/UL (ref 0–0.6)
EOSINOPHILS RELATIVE PERCENT: 1.2 %
GFR AFRICAN AMERICAN: >60
GFR NON-AFRICAN AMERICAN: >60
GLOBULIN: 2.3 G/DL
GLUCOSE BLD-MCNC: 83 MG/DL (ref 70–99)
HCT VFR BLD CALC: 44.2 % (ref 36–48)
HEMOGLOBIN: 14.4 G/DL (ref 12–16)
LYMPHOCYTES ABSOLUTE: 2 K/UL (ref 1–5.1)
LYMPHOCYTES RELATIVE PERCENT: 29.6 %
MCH RBC QN AUTO: 26.5 PG (ref 26–34)
MCHC RBC AUTO-ENTMCNC: 32.7 G/DL (ref 31–36)
MCV RBC AUTO: 81.2 FL (ref 80–100)
MONOCYTES ABSOLUTE: 0.6 K/UL (ref 0–1.3)
MONOCYTES RELATIVE PERCENT: 8.5 %
NEUTROPHILS ABSOLUTE: 4.1 K/UL (ref 1.7–7.7)
NEUTROPHILS RELATIVE PERCENT: 59.8 %
PDW BLD-RTO: 13.7 % (ref 12.4–15.4)
PLATELET # BLD: 300 K/UL (ref 135–450)
PMV BLD AUTO: 8.9 FL (ref 5–10.5)
POTASSIUM SERPL-SCNC: 4.3 MMOL/L (ref 3.5–5.1)
RBC # BLD: 5.44 M/UL (ref 4–5.2)
SODIUM BLD-SCNC: 141 MMOL/L (ref 136–145)
TOTAL PROTEIN: 7 G/DL (ref 6.4–8.2)
WBC # BLD: 6.8 K/UL (ref 4–11)

## 2021-05-18 PROCEDURE — 85025 COMPLETE CBC W/AUTO DIFF WBC: CPT | Performed by: INTERNAL MEDICINE

## 2021-05-18 PROCEDURE — 99214 OFFICE O/P EST MOD 30 MIN: CPT | Performed by: INTERNAL MEDICINE

## 2021-05-18 NOTE — PROGRESS NOTES
[address] MD                                                                                                [phone] (W) [phone] (F)      Primary provider: Laura Arriaga MD  Patient identification: Brent Carlin: 1960,Sex: female         ASSESSMENT/PLAN:    Martha Melendez was seen today for follow-up. Diagnoses and all orders for this visit:    Psoriatic arthritis ([facility] [address]    Psoriasis    High risk medication use  -     CBC WITH DIFFERENTIAL/PLATELET  -     Comprehensive Metabolic Panel    Primary osteoarthritis involving multiple joints      Ovarian cancer 2003- mucinous adenocarcinoma, surgery, Chemo- cancer free- follows up with oncologist.  Onset at the age of 24- started with Dactylitis, then progressed to wide spread inflammatory arthritis. Started with Medrol, Sulfasalazine, Enbrel-early 2000's. A/P Today's visit-  Psoriatic arthritis-in remission on Enbrel 50 mg weekly. Scalp psoriasis- Active, but stable. Following Derm. Using topical steroid cream.    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  We talked about reducing meloxicam half tablets a day to limit the dose of NSAID and prevent long-term toxicities mainly renal.    Follow-up in 4 months. Patient indicates understanding and agrees with the management plan. I reviewed patients medical information and medical history in the medical records. Note is transcribed using voice recognition software. Inadvertent computerized transcription errors may be present. ##################################################################    Subjective: Follow-up for psoriasis, psoriatic arthritis and osteoarthritis. Interval changes-psoriatic arthritis doing well. Psoriasis, persistent in a few areas, followed by dermatology, uses topical steroid.   Generalized osteoarthritis causing migratory arthralgias-symptoms are manageable on meloxicam.  No swollen or inflamed joints. No overt flares. Tolerating medications well. ADLs and recreational activities are normal.  All other review of systems are negative. Past medical, surgical history, social history allergies Meds reviewed. Current Outpatient Medications   Medication Sig Dispense Refill    lisinopril (PRINIVIL;ZESTRIL) 20 MG tablet TAKE 1 TABLET DAILY 90 tablet 3    linaCLOtide (LINZESS) 72 MCG CAPS capsule Take 1 capsule by mouth every morning (before breakfast) 90 capsule 3    clobetasol (TEMOVATE) 0.05 % external solution APPLY TO THE AFFECTED AREA TWICE DAILY AS NEEDED FOR FLARES 50 mL 5    ENBREL SURECLICK 50 MG/ML SOAJ INJECT 1 PEN UNDER THE SKIN EVERY 7 DAYS. 4 mL 3    atorvastatin (LIPITOR) 20 MG tablet TAKE 1 TABLET DAILY 90 tablet 2    meloxicam (MOBIC) 15 MG tablet TAKE 1 TABLET DAILY 90 tablet 3    hydroCHLOROthiazide (HYDRODIURIL) 12.5 MG tablet Take 1 tablet by mouth daily 90 tablet 0    fluocinolone (DERMA-SMOOTHE/FS SCALP) 0.01 % external oil Apply topically at bedtime with cap as directed. Wash in the morning. 120 mL 5    SORILUX 0.005 % FOAM Apply daily to the scalp for psoriasis. 120 g 5    zinc sulfate (ZINCATE) 220 (50 Zn) MG capsule Take 50 mg by mouth daily      tretinoin (RETIN-A) 0.05 % cream Apply a pea sized amount to the face QHS 45 g 3    clobetasol (TEMOVATE) 0.05 % ointment Apply to affected area BID PRN flares 60 g 1    CLOBEX SPRAY 0.05 % LIQD Apply to scalp daily - bid prn flares. 250 mL 3    Estradiol (YUVAFEM) 10 MCG TABS vaginal tablet INSERT 1 TABLET VAGINALLY TWICE WEEKLY      fluticasone (FLONASE) 50 MCG/ACT nasal spray 1 spray by Nasal route daily      Ascorbic Acid (VITAMIN C) 500 MG tablet Take 1,000 mg by mouth daily.  Cholecalciferol (VITAMIN D) 1000 UNIT CAPS Take 1 capsule by mouth daily.  Flaxseed, Linseed, (FLAXSEED OIL) 1000 MG CAPS Take  by mouth.       Green Tea 250 MG CAPS Take  by mouth daily.  PROBIOTIC CAPS Take  by mouth daily.  clonazePAM (KLONOPIN) 0.5 MG tablet Take 1 tablet by mouth daily as needed for Anxiety for up to 60 days. 60 tablet 0     No current facility-administered medications for this visit. Allergies   Allergen Reactions    Sulfa Antibiotics Rash    Codeine          OBJECTIVE  Physical    Vitals:    05/18/21 0829   BP: 120/78       General appearance/psychiatric: Well nourished and well groomed, normal judgment. Alert, appears stated age and cooperative. MKS:     28 joint JOINT COUNT:28 joint count 0                               Right                                                  Left   Swell Tender Swell Tender   PIP1           PIP2           PIP3           PIP4          PIP5          MCP1           MCP2           MCP3           MCP4           MCP5           Wrist           Elbow           Shoulder          Knee             I do not appreciate any tender, swollen or inflamed joints in upper or lower extremities. OA changes across DIPs, PIPs and knees unchanged. F ROM in peripheral joints in upper and lower extremities. Normal gait. No dactylitis or enthesitis. Skin: Skin psoriasis-scattered lesions in her elbows, front of the knee and scalp. No decidual changes.      Data:  Lab Results   Component Value Date    WBC 6.3 11/20/2020    RBC 5.24 11/20/2020    HGB 13.9 11/20/2020    HCT 42.7 11/20/2020     11/20/2020    MCV 81.5 11/20/2020    MCH 26.6 11/20/2020    MCHC 32.6 11/20/2020    RDW 13.3 11/20/2020    SEGSPCT 55.3 03/15/2013    LYMPHOPCT 33.2 11/20/2020    MONOPCT 9.9 11/20/2020    BASOPCT 1.1 11/20/2020    MONOSABS 0.6 11/20/2020    LYMPHSABS 2.1 11/20/2020    EOSABS 0.1 11/20/2020    BASOSABS 0.1 11/20/2020    DIFFTYPE Auto-K 03/15/2013       Chemistry        Component Value Date/Time     11/20/2020 0757    K 4.5 11/20/2020 0757     11/20/2020 0757    CO2 27 11/20/2020 0757    BUN 26 (H) 11/20/2020 0757    CREATININE 0.7 11/20/2020 0757        Component Value Date/Time    CALCIUM 9.5 11/20/2020 0757    ALKPHOS 101 11/20/2020 0757    AST 18 11/20/2020 0757    ALT 22 11/20/2020 0757    BILITOT 0.3 11/20/2020 0757             I thank you for giving me the opportunity to be involved in [facility] and I look forward following Aj Vargas along with you. If you have any questions or concerns please feel free to contact me at any time.     Nargis Zhao MD 05/18/21

## 2021-05-24 RX ORDER — ETANERCEPT 50 MG/ML
SOLUTION SUBCUTANEOUS
Qty: 4 ML | Refills: 3 | Status: SHIPPED | OUTPATIENT
Start: 2021-05-24 | End: 2021-10-06

## 2021-07-27 RX ORDER — CLOBETASOL PROPIONATE 0.46 MG/ML
SOLUTION TOPICAL
Qty: 50 ML | Refills: 5 | Status: SHIPPED | OUTPATIENT
Start: 2021-07-27 | End: 2021-11-08 | Stop reason: SDUPTHER

## 2021-08-03 DIAGNOSIS — E78.2 MIXED HYPERLIPIDEMIA: ICD-10-CM

## 2021-08-03 RX ORDER — ATORVASTATIN CALCIUM 20 MG/1
TABLET, FILM COATED ORAL
Qty: 90 TABLET | Refills: 0 | Status: SHIPPED | OUTPATIENT
Start: 2021-08-03 | End: 2021-11-23

## 2021-08-03 RX ORDER — ATORVASTATIN CALCIUM 20 MG/1
TABLET, FILM COATED ORAL
Qty: 90 TABLET | Refills: 2 | OUTPATIENT
Start: 2021-08-03

## 2021-09-07 ENCOUNTER — OFFICE VISIT (OUTPATIENT)
Dept: INTERNAL MEDICINE CLINIC | Age: 61
End: 2021-09-07
Payer: COMMERCIAL

## 2021-09-07 VITALS
TEMPERATURE: 97.9 F | HEART RATE: 72 BPM | HEIGHT: 65 IN | BODY MASS INDEX: 24.72 KG/M2 | OXYGEN SATURATION: 98 % | WEIGHT: 148.4 LBS | SYSTOLIC BLOOD PRESSURE: 122 MMHG | DIASTOLIC BLOOD PRESSURE: 80 MMHG

## 2021-09-07 DIAGNOSIS — J32.9 RECURRENT SINUSITIS: ICD-10-CM

## 2021-09-07 DIAGNOSIS — I10 ESSENTIAL HYPERTENSION: ICD-10-CM

## 2021-09-07 DIAGNOSIS — E78.2 MIXED HYPERLIPIDEMIA: ICD-10-CM

## 2021-09-07 DIAGNOSIS — L40.50 PSORIATIC ARTHRITIS (HCC): Primary | ICD-10-CM

## 2021-09-07 DIAGNOSIS — K59.09 OTHER CONSTIPATION: ICD-10-CM

## 2021-09-07 PROCEDURE — 99214 OFFICE O/P EST MOD 30 MIN: CPT | Performed by: INTERNAL MEDICINE

## 2021-09-07 SDOH — ECONOMIC STABILITY: FOOD INSECURITY: WITHIN THE PAST 12 MONTHS, YOU WORRIED THAT YOUR FOOD WOULD RUN OUT BEFORE YOU GOT MONEY TO BUY MORE.: NEVER TRUE

## 2021-09-07 SDOH — ECONOMIC STABILITY: FOOD INSECURITY: WITHIN THE PAST 12 MONTHS, THE FOOD YOU BOUGHT JUST DIDN'T LAST AND YOU DIDN'T HAVE MONEY TO GET MORE.: NEVER TRUE

## 2021-09-07 ASSESSMENT — SOCIAL DETERMINANTS OF HEALTH (SDOH): HOW HARD IS IT FOR YOU TO PAY FOR THE VERY BASICS LIKE FOOD, HOUSING, MEDICAL CARE, AND HEATING?: NOT HARD AT ALL

## 2021-09-07 NOTE — PROGRESS NOTES
Caitlin Nelson (:  1960) is a 64 y.o. female,Established patient, here for evaluation of the following chief complaint(s):  Establish Care         ASSESSMENT/PLAN:  1. Psoriatic arthritis ([address]   -Stable on Enbrel, followed by rheumatologist  2. Recurrent sinusitis   -Improving from the most recent episode, she reports typically at least a couple per year  3. Essential hypertension   -Stable, controlled   -Continue HCTZ 12.5 mg daily, lisinopril 20 mg daily  4. Mixed hyperlipidemia  -Controlled, continue atorvastatin 20 mg daily  -     Comprehensive Metabolic Panel; Future  -     Lipid Panel; Future  -     CBC Auto Differential; Future  5. Other constipation   -Stable, continue Linzess 72 mcg daily    Return in about 6 months (around 3/7/2022) for HTN follow up. Subjective   SUBJECTIVE/OBJECTIVE:  HPI    Psoriatic arthritis -managed with Enbrel, she sees Dr. Kelly Morataya    Hx recurrent sinus infection -she had a recent episode, initially went to urgent care and was treated with a steroid pack, symptoms did not resolve and she finally had a course of antibiotics. Symptoms are continuing to resolve. Hypertension, hyperlipidemia -adherent to blood pressure medication and statin. She denies any side effects. This has been stable for a long time. Chronic constipation -this is managed with Linzess 72 mcg daily. She at one point was on a higher dose but it was too strong. Sees Dr. Bobby Hampton for hx ovarian ca    Review of Systems       Objective   Physical Exam  Vitals reviewed. Constitutional:       General: She is not in acute distress. Appearance: Normal appearance. She is well-developed. HENT:      Head: Normocephalic and atraumatic. Cardiovascular:      Rate and Rhythm: Normal rate and regular rhythm. Heart sounds: Normal heart sounds. Pulmonary:      Effort: Pulmonary effort is normal. No respiratory distress. Breath sounds: Normal breath sounds.    Musculoskeletal: Right lower leg: No edema. Left lower leg: No edema. Skin:     General: Skin is warm and dry. Neurological:      Mental Status: She is alert. An electronic signature was used to authenticate this note.     --Ania Hobbs MD

## 2021-09-15 ENCOUNTER — OFFICE VISIT (OUTPATIENT)
Dept: RHEUMATOLOGY | Age: 61
End: 2021-09-15
Payer: COMMERCIAL

## 2021-09-15 VITALS
DIASTOLIC BLOOD PRESSURE: 68 MMHG | SYSTOLIC BLOOD PRESSURE: 120 MMHG | BODY MASS INDEX: 24.99 KG/M2 | WEIGHT: 150 LBS | HEIGHT: 65 IN

## 2021-09-15 DIAGNOSIS — Z79.899 HIGH RISK MEDICATION USE: ICD-10-CM

## 2021-09-15 DIAGNOSIS — L40.50 PSORIATIC ARTHRITIS (HCC): Primary | ICD-10-CM

## 2021-09-15 DIAGNOSIS — E78.2 MIXED HYPERLIPIDEMIA: ICD-10-CM

## 2021-09-15 DIAGNOSIS — L40.9 PSORIASIS: ICD-10-CM

## 2021-09-15 LAB
A/G RATIO: 1.9 (ref 1.1–2.2)
ALBUMIN SERPL-MCNC: 4.5 G/DL (ref 3.4–5)
ALP BLD-CCNC: 93 U/L (ref 40–129)
ALT SERPL-CCNC: 21 U/L (ref 10–40)
ANION GAP SERPL CALCULATED.3IONS-SCNC: 12 MMOL/L (ref 3–16)
AST SERPL-CCNC: 21 U/L (ref 15–37)
BASOPHILS ABSOLUTE: 0.1 K/UL (ref 0–0.2)
BASOPHILS RELATIVE PERCENT: 1.1 %
BILIRUB SERPL-MCNC: 0.5 MG/DL (ref 0–1)
BUN BLDV-MCNC: 20 MG/DL (ref 7–20)
CALCIUM SERPL-MCNC: 9.9 MG/DL (ref 8.3–10.6)
CHLORIDE BLD-SCNC: 105 MMOL/L (ref 99–110)
CHOLESTEROL, TOTAL: 181 MG/DL (ref 0–199)
CO2: 25 MMOL/L (ref 21–32)
CREAT SERPL-MCNC: 0.7 MG/DL (ref 0.6–1.1)
EOSINOPHILS ABSOLUTE: 0.1 K/UL (ref 0–0.6)
EOSINOPHILS RELATIVE PERCENT: 1.7 %
GFR AFRICAN AMERICAN: >60
GFR NON-AFRICAN AMERICAN: >60
GLOBULIN: 2.4 G/DL
GLUCOSE BLD-MCNC: 99 MG/DL (ref 70–99)
HCT VFR BLD CALC: 43.8 % (ref 36–48)
HDLC SERPL-MCNC: 55 MG/DL (ref 40–60)
HEMOGLOBIN: 14.1 G/DL (ref 12–16)
LDL CHOLESTEROL CALCULATED: 98 MG/DL
LYMPHOCYTES ABSOLUTE: 1.8 K/UL (ref 1–5.1)
LYMPHOCYTES RELATIVE PERCENT: 36.9 %
MCH RBC QN AUTO: 26.5 PG (ref 26–34)
MCHC RBC AUTO-ENTMCNC: 32.2 G/DL (ref 31–36)
MCV RBC AUTO: 82.2 FL (ref 80–100)
MONOCYTES ABSOLUTE: 0.4 K/UL (ref 0–1.3)
MONOCYTES RELATIVE PERCENT: 7.8 %
NEUTROPHILS ABSOLUTE: 2.6 K/UL (ref 1.7–7.7)
NEUTROPHILS RELATIVE PERCENT: 52.5 %
PDW BLD-RTO: 14 % (ref 12.4–15.4)
PLATELET # BLD: 316 K/UL (ref 135–450)
PMV BLD AUTO: 8.6 FL (ref 5–10.5)
POTASSIUM SERPL-SCNC: 4.4 MMOL/L (ref 3.5–5.1)
RBC # BLD: 5.33 M/UL (ref 4–5.2)
SODIUM BLD-SCNC: 142 MMOL/L (ref 136–145)
TOTAL PROTEIN: 6.9 G/DL (ref 6.4–8.2)
TRIGL SERPL-MCNC: 140 MG/DL (ref 0–150)
VLDLC SERPL CALC-MCNC: 28 MG/DL
WBC # BLD: 5 K/UL (ref 4–11)

## 2021-09-15 PROCEDURE — 99214 OFFICE O/P EST MOD 30 MIN: CPT | Performed by: INTERNAL MEDICINE

## 2021-09-15 NOTE — PROGRESS NOTES
[address] MD                                                                                                 (N) [phone] (F)      Primary provider: Satinder Norman MD  Patient identification: Solange Bae: 1960,Sex: female         ASSESSMENT/PLAN:    Polo Hatch was seen today for follow-up. Diagnoses and all orders for this visit:    Psoriatic arthritis ([facility] [address]    Psoriasis    High risk medication use    Mixed hyperlipidemia  -     CBC Auto Differential  -     Lipid Panel  -     Comprehensive Metabolic Panel      Ovarian cancer 2003- mucinous adenocarcinoma, surgery, Chemo- cancer free- follows up with oncologist.  Onset at the age of 24- started with Dactylitis, then progressed to wide spread inflammatory arthritis. Started with Medrol, Sulfasalazine, Enbrel-early 2000's. A/P Today's visit-  Psoriatic arthritis- Asymptomatic  Enbrel 50 mg weekly. Tolerating medications well. Skin psoriasis-a few small scattered patches, manageable with topical clobetasol. 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 Plan-check safety labs today. Continue current medications. Prescriptions authorized. Recommendations were discussed for COVID-19 booster vaccine, recommend getting Shingrix and seasonal flu vaccine. RTC 4 months. Patient indicates understanding and agrees with the management plan. I reviewed patients medical information and medical history in the medical records. Note is transcribed using voice recognition software. Inadvertent computerized transcription errors may be present. ##################################################################    Subjective: Follow-up for psoriasis, psoriatic arthritis and osteoarthritis.     Timi Solares continues to have mild intercurrent arthralgias from osteoarthritis, manageable on meloxicam.  No flares or symptoms of psoriatic arthritis. Minor small psoriatic lesions, utilizes topical clobetasol. Overall, remained stable. Normal ADLs and recreational activities. Tolerating medications well. All other review of systems are negative. Past medical, surgical history, social history allergies Meds reviewed. Current Outpatient Medications   Medication Sig Dispense Refill    atorvastatin (LIPITOR) 20 MG tablet TAKE 1 TABLET DAILY 90 tablet 0    clobetasol (TEMOVATE) 0.05 % external solution APPLY TO THE AFFECTED AREA TWICE DAILY AS NEEDED FOR FLARES 50 mL 5    ENBREL SURECLICK 50 MG/ML SOAJ INJECT 1 PEN UNDER THE SKIN EVERY 7 DAYS. 4 mL 3    lisinopril (PRINIVIL;ZESTRIL) 20 MG tablet TAKE 1 TABLET DAILY 90 tablet 3    linaCLOtide (LINZESS) 72 MCG CAPS capsule Take 1 capsule by mouth every morning (before breakfast) 90 capsule 3    meloxicam (MOBIC) 15 MG tablet TAKE 1 TABLET DAILY 90 tablet 3    hydroCHLOROthiazide (HYDRODIURIL) 12.5 MG tablet Take 1 tablet by mouth daily 90 tablet 0    fluocinolone (DERMA-SMOOTHE/FS SCALP) 0.01 % external oil Apply topically at bedtime with cap as directed. Wash in the morning. 120 mL 5    SORILUX 0.005 % FOAM Apply daily to the scalp for psoriasis. 120 g 5    zinc sulfate (ZINCATE) 220 (50 Zn) MG capsule Take 50 mg by mouth daily      tretinoin (RETIN-A) 0.05 % cream Apply a pea sized amount to the face QHS 45 g 3    clobetasol (TEMOVATE) 0.05 % ointment Apply to affected area BID PRN flares 60 g 1    CLOBEX SPRAY 0.05 % LIQD Apply to scalp daily - bid prn flares. 250 mL 3    Estradiol (YUVAFEM) 10 MCG TABS vaginal tablet INSERT 1 TABLET VAGINALLY TWICE WEEKLY      fluticasone (FLONASE) 50 MCG/ACT nasal spray 1 spray by Nasal route daily      Ascorbic Acid (VITAMIN C) 500 MG tablet Take 1,000 mg by mouth daily.  Cholecalciferol (VITAMIN D) 1000 UNIT CAPS Take 1 capsule by mouth daily.  Flaxseed, Linseed, (FLAXSEED OIL) 1000 MG CAPS Take  by mouth.  Green Tea 250 MG CAPS Take  by mouth daily.  PROBIOTIC CAPS Take  by mouth daily.  clonazePAM (KLONOPIN) 0.5 MG tablet Take 1 tablet by mouth daily as needed for Anxiety for up to 60 days. 60 tablet 0     No current facility-administered medications for this visit. Allergies   Allergen Reactions    Sulfa Antibiotics Rash    Codeine          OBJECTIVE  Physical    Vitals:    09/15/21 0758   BP: 120/68       General appearance/psychiatric: Well nourished and well groomed, normal judgment. Alert, appears stated age and cooperative. MKS:     28 joint JOINT COUNT:28 joint count 0                               Right                                                  Left   Swell Tender Swell Tender   PIP1           PIP2           PIP3           PIP4          PIP5          MCP1           MCP2           MCP3           MCP4           MCP5           Wrist           Elbow           Shoulder          Knee             She does not have any tender swollen or inflamed joints in upper or lower extremities. OA changes across her DIPs, PIPs and knees are unchanged, nontender, no swelling or synovitis. F ROM in all peripheral joints. Normal gait. No dactylitis or enthesitis. Skin: Skin psoriasis-very small scattered lesions in her elbows, shin area and scalp.     Data:  Lab Results   Component Value Date    WBC 6.8 05/18/2021    RBC 5.44 05/18/2021    HGB 14.4 05/18/2021    HCT 44.2 05/18/2021     05/18/2021    MCV 81.2 05/18/2021    MCH 26.5 05/18/2021    MCHC 32.7 05/18/2021    RDW 13.7 05/18/2021    SEGSPCT 55.3 03/15/2013    LYMPHOPCT 29.6 05/18/2021    MONOPCT 8.5 05/18/2021    BASOPCT 0.9 05/18/2021    MONOSABS 0.6 05/18/2021    LYMPHSABS 2.0 05/18/2021    EOSABS 0.1 05/18/2021    BASOSABS 0.1 05/18/2021    DIFFTYPE Auto-K 03/15/2013       Chemistry        Component Value Date/Time     05/18/2021 0849    K 4.3 05/18/2021 0849     05/18/2021 0849    CO2 25 05/18/2021 0849    BUN 24 (H) 05/18/2021 0849    CREATININE 0.7 05/18/2021 0849        Component Value Date/Time    CALCIUM 10.1 05/18/2021 0849    ALKPHOS 160 (H) 05/18/2021 0849    AST 31 05/18/2021 0849    ALT 28 05/18/2021 0849    BILITOT <0.2 05/18/2021 0849             I thank you for giving me the opportunity to be involved in [address] care and I look forward following Tonja Henry along with you. If you have any questions or concerns please feel free to contact me at any time.     Parth Mitchell MD 09/15/21

## 2021-10-06 RX ORDER — ETANERCEPT 50 MG/ML
SOLUTION SUBCUTANEOUS
Qty: 4 EACH | Refills: 3 | Status: SHIPPED | OUTPATIENT
Start: 2021-10-06 | End: 2022-03-02

## 2021-10-20 RX ORDER — CLOBETASOL PROPIONATE 0.5 MG/G
OINTMENT TOPICAL
Qty: 60 G | Refills: 1 | Status: SHIPPED | OUTPATIENT
Start: 2021-10-20 | End: 2021-11-08 | Stop reason: SDUPTHER

## 2021-11-02 RX ORDER — CALCIPOTRIENE 50 UG/G
AEROSOL, FOAM TOPICAL
Qty: 120 G | Refills: 5 | Status: SHIPPED | OUTPATIENT
Start: 2021-11-02 | End: 2021-11-08 | Stop reason: SDUPTHER

## 2021-11-08 ENCOUNTER — OFFICE VISIT (OUTPATIENT)
Dept: DERMATOLOGY | Age: 61
End: 2021-11-08
Payer: COMMERCIAL

## 2021-11-08 VITALS — TEMPERATURE: 97.4 F

## 2021-11-08 DIAGNOSIS — D22.9 MULTIPLE NEVI: Primary | ICD-10-CM

## 2021-11-08 DIAGNOSIS — L40.9 PSORIASIS: ICD-10-CM

## 2021-11-08 DIAGNOSIS — Z86.018 HISTORY OF DYSPLASTIC NEVUS: ICD-10-CM

## 2021-11-08 DIAGNOSIS — Z87.2 HISTORY OF ACTINIC KERATOSES: ICD-10-CM

## 2021-11-08 DIAGNOSIS — L82.1 SEBORRHEIC KERATOSIS: ICD-10-CM

## 2021-11-08 DIAGNOSIS — D48.5 NEOPLASM OF UNCERTAIN BEHAVIOR OF SKIN: ICD-10-CM

## 2021-11-08 PROCEDURE — 11102 TANGNTL BX SKIN SINGLE LES: CPT | Performed by: DERMATOLOGY

## 2021-11-08 PROCEDURE — 99214 OFFICE O/P EST MOD 30 MIN: CPT | Performed by: DERMATOLOGY

## 2021-11-08 RX ORDER — CALCIPOTRIENE 50 UG/G
AEROSOL, FOAM TOPICAL
Qty: 120 G | Refills: 5 | Status: SHIPPED | OUTPATIENT
Start: 2021-11-08 | End: 2022-02-23 | Stop reason: SDUPTHER

## 2021-11-08 RX ORDER — CLOBETASOL PROPIONATE 0.46 MG/ML
SOLUTION TOPICAL
Qty: 50 ML | Refills: 5 | Status: SHIPPED | OUTPATIENT
Start: 2021-11-08

## 2021-11-08 RX ORDER — TRETINOIN 0.5 MG/G
CREAM TOPICAL
Qty: 45 G | Refills: 3 | Status: SHIPPED | OUTPATIENT
Start: 2021-11-08 | End: 2022-04-25 | Stop reason: SDUPTHER

## 2021-11-08 RX ORDER — FLUOCINOLONE ACETONIDE 0.11 MG/ML
OIL TOPICAL
Qty: 120 ML | Refills: 5 | Status: SHIPPED | OUTPATIENT
Start: 2021-11-08

## 2021-11-08 RX ORDER — CLOBETASOL PROPIONATE 0.5 MG/G
OINTMENT TOPICAL
Qty: 60 G | Refills: 1 | Status: SHIPPED | OUTPATIENT
Start: 2021-11-08

## 2021-11-08 NOTE — PROGRESS NOTES
[facility]  Cyrus José MD  [phone]      Maximus Sahu  1960    63 y.o. female     Date of Visit: 11/8/2021    Last seen: 5-2021    Chief Complaint: moles/lesions, f/u psoriasis  Chief Complaint   Patient presents with    Skin Exam     6 mos skin exam     History of Present Illness:  *went to a [address] in [address] [place] [address] earlier this year    1. Mult nevi - here for full skin check; no new concerns or changing lesions. 2. F/u for AKs - no new concerns or probs with previous sites. 3. Hx of psoriasis, mainly scalp (on Enbrel for PsA arthritis) - sees Dr. Zander Rico now; fairly well-controlled with clobex spray/soluntion as needed flares on the scalp + sorilux and occasional dermasmoothe and clobetasol oint prn flares elsewhere. She has been flaring on the occipital scalp and a few lesions on the distal LE's currently. Added Sorilux foam more recently for recent scalp flares and this has helped some. 4.  S/p bx of esion on the left upper back at last visit -r ead as mod dysplastic nevus. Had recurrence of pigment here last visit in October 2020 so repeat biopsy was done and read as junctional recurrence of a previous biopsy nevus, no atypia seen. 5. She notes a persistent pink papule on the L jawline. Asx. S/p excision of 2 cysts on the back in 2018 - one had ruptured back in the summer -  Ended up in ED and had I/D with packing and abx - clinda then doxy d/t culture grew staph lugdunensis prior to excision. Hx of ovarian cancer; her sister was diagnosed with breast CA. In the past few years - her sister was BRCA neg. She has had her veins treated. Review of Systems:  Gen: Feels well, good sense of health. Skin: No changing moles or lesions. Past Medical History, Family History, Surgical History, Medications and Allergies reviewed.     Outpatient Medications Marked as Taking for the 11/8/21 encounter (Office Visit) with Sukhjinder Barrientos MD   Medication Sig Dispense Refill    SORILUX 0.005 % FOAM APPLY EXTERNALLY TO THE SCALP DAILY FOR PSORIASIS 120 g 5    clobetasol (TEMOVATE) 0.05 % ointment Apply to affected area BID PRN flares 60 g 1    ENBREL SURECLICK 50 MG/ML SOAJ INJECT 1 PEN UNDER THE SKIN EVERY 7 DAYS. 4 each 3    atorvastatin (LIPITOR) 20 MG tablet TAKE 1 TABLET DAILY 90 tablet 0    clobetasol (TEMOVATE) 0.05 % external solution APPLY TO THE AFFECTED AREA TWICE DAILY AS NEEDED FOR FLARES 50 mL 5    lisinopril (PRINIVIL;ZESTRIL) 20 MG tablet TAKE 1 TABLET DAILY 90 tablet 3    linaCLOtide (LINZESS) 72 MCG CAPS capsule Take 1 capsule by mouth every morning (before breakfast) 90 capsule 3    meloxicam (MOBIC) 15 MG tablet TAKE 1 TABLET DAILY 90 tablet 3    hydroCHLOROthiazide (HYDRODIURIL) 12.5 MG tablet Take 1 tablet by mouth daily 90 tablet 0    fluocinolone (DERMA-SMOOTHE/FS SCALP) 0.01 % external oil Apply topically at bedtime with cap as directed. Wash in the morning. 120 mL 5    clonazePAM (KLONOPIN) 0.5 MG tablet Take 1 tablet by mouth daily as needed for Anxiety for up to 60 days. 60 tablet 0    zinc sulfate (ZINCATE) 220 (50 Zn) MG capsule Take 50 mg by mouth daily      tretinoin (RETIN-A) 0.05 % cream Apply a pea sized amount to the face QHS 45 g 3    CLOBEX SPRAY 0.05 % LIQD Apply to scalp daily - bid prn flares. 250 mL 3    Estradiol (YUVAFEM) 10 MCG TABS vaginal tablet INSERT 1 TABLET VAGINALLY TWICE WEEKLY      fluticasone (FLONASE) 50 MCG/ACT nasal spray 1 spray by Nasal route daily      Ascorbic Acid (VITAMIN C) 500 MG tablet Take 1,000 mg by mouth daily.  Cholecalciferol (VITAMIN D) 1000 UNIT CAPS Take 1 capsule by mouth daily.  Flaxseed, Linseed, (FLAXSEED OIL) 1000 MG CAPS Take  by mouth.  Green Tea 250 MG CAPS Take  by mouth daily.  PROBIOTIC CAPS Take  by mouth daily.        Allergies   Allergen Reactions    Sulfa Antibiotics Rash    Codeine        Past Medical History:   Diagnosis Date    Actinic keratosis 2010    Allergic rhinitis     Anxiety 2010    Cancer (HCC)     ovarian    Chronic idiopathic constipation 2017    Hemifacial spasm     Botox    Hyperlipidemia     Osteoarthritis     Psoriasis     Psoriatic arthritis ([facility] [address]      Past Surgical History:   Procedure Laterality Date    BRAIN SURGERY      for blood vessel abnormality    BREAST REDUCTION SURGERY       SECTION      COLONOSCOPY  12.    FINGER SURGERY  2007    Left index finger     HYSTERECTOMY      INNER EAR SURGERY  2008    repair artery right ear    NEUROMA SURGERY      microvascular reconstruction to fix hemifacial spasm    RHINOPLASTY      SHOULDER SURGERY      left shoulder    AMRITA AND BSO      TOE SURGERY      TONSILLECTOMY      VARICOSE VEIN SURGERY      WRIST SURGERY      right wrist       Physical Examination     Gen, well-appearing  FSE today    trunk and extremities with scattered brown macules and papules   No AK's  Occipital scalp with mild scaly patches  Left upper back, near linear scar from cyst removal -scar clear  L jawline with 1-2 mm pink papule    Assessment and Plan     1. Benign-appearing nevi and few SKs  2. AK's - no new concerns  - educ re si/sx/ABCD's of MM   educ sun protection - OTC sunscreen with SPF 30-50+ recommended and reviewed usage  encouraged skin check yearly (sooner if indicated), self checks    3. Psoriasis - mild flaring on the scalp and focal macules on the legs, 1-2% BSA  - clobex spray/soln for the scalp prn flares; ed se/misuse  - clobetasol oint daily - bid prn flares; ed se/misuse  - cont soriulux for the scalp  - can add dermasmoothe oil prn more severe scalp flares  - cont Enbrel per rheum - no skin side effects -discussed some alternative treatments may give better skin clearance but may not have as good of control with arthritis/joint symptoms, so would not recommend a change at this point    4.  Moderately dysplastic nevus - L upper back near scar from cyst - removed  - Recurrent and removed  - remains clear  -Continue sun protection as above     5. L jawline - r/o BCC vs adnexal growth  - Shave biopsy performed after verbal consent obtained. Patient educated regarding risk of bleeding, infection, scar and educated on wound care. Skin cleansed with alcohol pad and site anesthetized with lido + epi. Aluminum chloride applied to site for hemostasis. Petrolatum ointment and bandage applied. Specimen bottle labeled with patient information and site and specimen sent to [facility]. Follow-up 6 to 12 months. Filled tretinoin.

## 2021-11-08 NOTE — PATIENT INSTRUCTIONS
Biopsy Wound Care Instructions    · Keep the bandage in place for 24 hours. · Cleanse the wound with mild soapy water daily   Gently dry the area.  Apply Vaseline or petroleum jelly to the wound using a cotton tipped applicator.  Cover with a clean bandage.  Repeat this process until the biopsy site is healed.  If you had stitches placed, continue treating the site until the stitches are removed. Remember to make an appointment to return to have your stitches removed by our staff.  You may shower and bathe as usual.       ** Biopsy results generally take around 7 business days to come back. If you have not heard from us by then, please call the office at [phone]. *Please note that biopsy results are released to both the patient and physician at the same time in [address] Please allow time for your physician to review the results. One of our staff members will reach out to you with the results and plan. Protecting Yourself From the Sun    · Apply an over-the-counter broad spectrum water resistant sunscreen with an SPF of at least 30 to exposed areas of the skin. Dont forget the ears and lips! Remember to reapply sunscreen about every 2 hours and after swimming or sweating. · Wear sun protective clothing. Swim shirts (aka. rash guards) are a great idea and negates the need to reapply sunscreen in those areas.      · Seek the shade whenever possible especially between the hours of 10 am and 4 pm when the suns rays are the strongest.     · Avoid tanning beds  ·

## 2021-11-10 LAB — DERMATOLOGY PATHOLOGY REPORT: NORMAL

## 2021-11-22 DIAGNOSIS — E78.2 MIXED HYPERLIPIDEMIA: ICD-10-CM

## 2021-11-23 RX ORDER — ATORVASTATIN CALCIUM 20 MG/1
TABLET, FILM COATED ORAL
Qty: 90 TABLET | Refills: 0 | Status: SHIPPED | OUTPATIENT
Start: 2021-11-23 | End: 2022-02-10

## 2021-12-09 ENCOUNTER — TELEPHONE (OUTPATIENT)
Dept: INTERNAL MEDICINE CLINIC | Age: 61
End: 2021-12-09

## 2021-12-09 NOTE — TELEPHONE ENCOUNTER
Patient called, she tested positive for covid yesterday, 12/08/21. She is trying to get the monoclonal antibodies asap. Calling this morning at 8:30 when they open. She just wanted to notify Dr. Erika Pierson. ..

## 2021-12-13 DIAGNOSIS — G25.81 RESTLESS LEGS SYNDROME (RLS): ICD-10-CM

## 2021-12-13 DIAGNOSIS — F41.9 ANXIETY: ICD-10-CM

## 2021-12-13 NOTE — TELEPHONE ENCOUNTER
----- Message from Mateo Thompson sent at 12/13/2021  1:44 PM EST -----  Subject: Message to Provider    QUESTIONS  Information for Provider? Pt of Dr. Estella Martins called to make appt for medication   follow up. Received message-Unable to perform action due to internal   technical failure, while searching for an appt. Requested call back. ---------------------------------------------------------------------------  --------------  Nathaly BAIN  What is the best way for the office to contact you? OK to leave message on   voicemail  Preferred Call Back Phone Number? [phone]  ---------------------------------------------------------------------------  --------------  SCRIPT ANSWERS  Relationship to Patient?  Self

## 2021-12-16 ENCOUNTER — OFFICE VISIT (OUTPATIENT)
Dept: RHEUMATOLOGY | Age: 61
End: 2021-12-16
Payer: COMMERCIAL

## 2021-12-16 VITALS
SYSTOLIC BLOOD PRESSURE: 124 MMHG | WEIGHT: 155 LBS | BODY MASS INDEX: 25.83 KG/M2 | HEIGHT: 65 IN | DIASTOLIC BLOOD PRESSURE: 78 MMHG

## 2021-12-16 DIAGNOSIS — J01.91 ACUTE RECURRENT SINUSITIS, UNSPECIFIED LOCATION: ICD-10-CM

## 2021-12-16 DIAGNOSIS — L40.9 PSORIASIS: ICD-10-CM

## 2021-12-16 DIAGNOSIS — Z79.899 HIGH RISK MEDICATION USE: ICD-10-CM

## 2021-12-16 DIAGNOSIS — L40.50 PSORIATIC ARTHRITIS (HCC): Primary | ICD-10-CM

## 2021-12-16 PROCEDURE — 99214 OFFICE O/P EST MOD 30 MIN: CPT | Performed by: INTERNAL MEDICINE

## 2021-12-16 RX ORDER — AMOXICILLIN AND CLAVULANATE POTASSIUM 875; 125 MG/1; MG/1
1 TABLET, FILM COATED ORAL 2 TIMES DAILY
Qty: 14 TABLET | Refills: 0 | Status: SHIPPED | OUTPATIENT
Start: 2021-12-16 | End: 2021-12-23

## 2021-12-16 RX ORDER — CLONAZEPAM 0.5 MG/1
0.5 TABLET ORAL DAILY PRN
Qty: 30 TABLET | Refills: 0 | Status: SHIPPED | OUTPATIENT
Start: 2021-12-16 | End: 2022-01-21 | Stop reason: SDUPTHER

## 2021-12-16 NOTE — PROGRESS NOTES
[address] Yang MD                                                                                                [phone] (S) [phone] (F)      Primary provider: Kirti Vasquez MD  Patient identification: Drew Epps: 1960,Sex: female         ASSESSMENT/PLAN:    Aileen Rivas was seen today for follow-up. 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  Onset at the age of 24- started with Dactylitis, then progressed to wide spread inflammatory arthritis. Started with Medrol, Sulfasalazine, Enbrel-early 2000's. A/P Today's visit-    Psoriatic arthritis- Asymptomatic  Enbrel 50 mg weekly. Tolerating medications well. Continue Enbrel weekly. Prescription authorized. Skin psoriasis-has a few erythematous areas in her elbows, well managed with as needed topical clobetasol. 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 Current URI with sinus drainage and ear clogging-had similar symptoms a month ago, took Augmentin. I represcribed a course of Augmentin but recommend seeing ENT. She also has tubes in both eardrums. RTC 4 months. Patient indicates understanding and agrees with the management plan. I reviewed patients medical information and medical history in the medical records. Note is transcribed using voice recognition software.  Inadvertent computerized transcription errors may be present. ##################################################################    Subjective: Follow-up for psoriasis, psoriatic arthritis and osteoarthritis. Interval Corina Hernandez is doing well in terms of psoriatic arthritis and osteoarthritis. Tends to get psoriasis in her elbows but manageable with topical steroid injection. What is bothering her most is yellow-green sinus drainage, stuffy nose, congested ears. She had URI couple of weeks ago, initially thought to be Covid but PCR was negative. She also received monoclonal antibodies. No fever, chills. Feels well otherwise at this time. She developed sinus infection in October. She has tubes in both eardrums. She is having difficulty in contacting primary care physician. All other review of systems are negative. Past medical, surgical history, social history allergies Meds reviewed. Current Outpatient Medications   Medication Sig Dispense Refill    amoxicillin-clavulanate (AUGMENTIN) 875-125 MG per tablet Take 1 tablet by mouth 2 times daily for 7 days 14 tablet 0    clonazePAM (KLONOPIN) 0.5 MG tablet Take 1 tablet by mouth daily as needed for Anxiety for up to 60 days. 60 tablet 0    atorvastatin (LIPITOR) 20 MG tablet TAKE 1 TABLET DAILY 90 tablet 0    fluocinolone (DERMA-SMOOTHE/FS SCALP) 0.01 % external oil Apply topically at bedtime with cap as directed. Wash in the morning. 120 mL 5    clobetasol (TEMOVATE) 0.05 % external solution Apply topically 2 times daily. 50 mL 5    clobetasol (TEMOVATE) 0.05 % ointment Apply to affected area BID PRN flares 60 g 1    CLOBEX SPRAY 0.05 % LIQD Apply to scalp daily - bid prn flares. 250 mL 3    SORILUX 0.005 % FOAM APPLY EXTERNALLY TO THE SCALP DAILY FOR PSORIASIS 120 g 5    tretinoin (RETIN-A) 0.05 % cream Apply a pea sized amount to the face QHS 45 g 3    ENBREL SURECLICK 50 MG/ML SOAJ INJECT 1 PEN UNDER THE SKIN EVERY 7 DAYS.  4 each 3    lisinopril (PRINIVIL;ZESTRIL) 20 MG tablet TAKE 1 TABLET DAILY 90 tablet 3    linaCLOtide (LINZESS) 72 MCG CAPS capsule Take 1 capsule by mouth every morning (before breakfast) 90 capsule 3    meloxicam (MOBIC) 15 MG tablet TAKE 1 TABLET DAILY 90 tablet 3    hydroCHLOROthiazide (HYDRODIURIL) 12.5 MG tablet Take 1 tablet by mouth daily 90 tablet 0    zinc sulfate (ZINCATE) 220 (50 Zn) MG capsule Take 50 mg by mouth daily      Estradiol (YUVAFEM) 10 MCG TABS vaginal tablet INSERT 1 TABLET VAGINALLY TWICE WEEKLY      fluticasone (FLONASE) 50 MCG/ACT nasal spray 1 spray by Nasal route daily      Ascorbic Acid (VITAMIN C) 500 MG tablet Take 1,000 mg by mouth daily.  Cholecalciferol (VITAMIN D) 1000 UNIT CAPS Take 1 capsule by mouth daily.  Flaxseed, Linseed, (FLAXSEED OIL) 1000 MG CAPS Take  by mouth.  Green Tea 250 MG CAPS Take  by mouth daily.  PROBIOTIC CAPS Take  by mouth daily. No current facility-administered medications for this visit. Allergies   Allergen Reactions    Sulfa Antibiotics Rash    Codeine          OBJECTIVE  Physical    Vitals:    12/16/21 0744   BP: 124/78       General appearance/psychiatric: Well nourished and well groomed, normal judgment. Alert, appears stated age and cooperative. MKS:     28 joint JOINT COUNT:28 joint count 0                               Right                                                  Left   Swell Tender Swell Tender   PIP1           PIP2           PIP3           PIP4          PIP5          MCP1           MCP2           MCP3           MCP4           MCP5           Wrist           Elbow           Shoulder          Knee             Do not appreciate any tender swollen inflamed joints in upper or lower extremities, F ROM in all peripheral joints. Normal gait. No dactylitis or enthesitis. Skin: Skin psoriasis-erythematous lesions in her elbows. ENT-maxillary sinus tenderness.     Data:  Lab Results   Component Value Date    WBC 5.0 09/15/2021    RBC 5.33 09/15/2021    HGB 14.1 09/15/2021    HCT 43.8 09/15/2021     09/15/2021    MCV 82.2 09/15/2021    MCH 26.5 09/15/2021    MCHC 32.2 09/15/2021    RDW 14.0 09/15/2021    SEGSPCT 55.3 03/15/2013    LYMPHOPCT 36.9 09/15/2021    MONOPCT 7.8 09/15/2021    BASOPCT 1.1 09/15/2021    MONOSABS 0.4 09/15/2021    LYMPHSABS 1.8 09/15/2021    EOSABS 0.1 09/15/2021    BASOSABS 0.1 09/15/2021    DIFFTYPE Auto-K 03/15/2013       Chemistry        Component Value Date/Time     09/15/2021 0811    K 4.4 09/15/2021 0811     09/15/2021 0811    CO2 25 09/15/2021 0811    BUN 20 09/15/2021 0811    CREATININE 0.7 09/15/2021 0811        Component Value Date/Time    CALCIUM 9.9 09/15/2021 0811    ALKPHOS 93 09/15/2021 0811    AST 21 09/15/2021 0811    ALT 21 09/15/2021 0811    BILITOT 0.5 09/15/2021 0811             I thank you for giving me the opportunity to be involved in [facility] care and I look forward following Jennifer Cee along with you. If you have any questions or concerns please feel free to contact me at any time.     Elliott Mancia MD 12/16/21

## 2022-01-10 ENCOUNTER — VIRTUAL VISIT (OUTPATIENT)
Dept: INTERNAL MEDICINE CLINIC | Age: 62
End: 2022-01-10
Payer: COMMERCIAL

## 2022-01-10 DIAGNOSIS — G25.81 RESTLESS LEG SYNDROME: Primary | ICD-10-CM

## 2022-01-10 DIAGNOSIS — J32.9 RECURRENT SINUSITIS: ICD-10-CM

## 2022-01-10 PROCEDURE — 99213 OFFICE O/P EST LOW 20 MIN: CPT | Performed by: INTERNAL MEDICINE

## 2022-01-10 NOTE — PROGRESS NOTES
Hector Richter (:  1960) is a 63 y.o. female,Established patient, here for evaluation of the following chief complaint(s): Medication Check         ASSESSMENT/PLAN:  1. Restless leg syndrome   - stable, continue clonazepam 0.5mg nightly. OARRS reviewed, no concerns  2. Recurrent sinusitis  - refer to ENT  -     Kalani Hall MD, Otolaryngology, [facility]      Return in about 3 months (around 4/10/2022) for restless leg. SUBJECTIVE/OBJECTIVE:  HPI    Has been on clonazepam for RLS for a long time. Symptoms start around 7-8pm. Also has a hard time falling asleep so helps with that too. Using at nighttime. Sinus infection - saw Dr. Rozina Osullivan, treated with Augmentin which cleared it up. Has been having one 2-3 times per year since moving to [place]. Has had allergy testing but allergy shots were never recommended. Is using montelukast and flonase. Getting pneumonia shot this week    Review of Systems    Patient-Reported Vitals 1/10/2022   Patient-Reported Weight 153lb   Patient-Reported Height 5'5        Physical Exam  Vitals reviewed. Constitutional:       General: She is not in acute distress. Appearance: Normal appearance. HENT:      Head: Normocephalic and atraumatic. Pulmonary:      Effort: Pulmonary effort is normal. No respiratory distress. Neurological:      Mental Status: She is alert. Psychiatric:         Mood and Affect: Mood normal.         Behavior: Behavior normal.         Thought Content: Thought content normal.         Judgment: Judgment normal.                   Hector Richter, was evaluated through a synchronous (real-time) audio-video encounter. The patient (or guardian if applicable) is aware that this is a billable service. Verbal consent to proceed has been obtained within the past 12 months.  The visit was conducted pursuant to the emergency declaration under the [facility] [address] waiver authority and the Coronavirus Preparedness and Response Supplemental Appropriations Act. Patient identification was verified, and a caregiver was present when appropriate. The patient was located in a state where the provider was credentialed to provide care. An electronic signature was used to authenticate this note.     --Rae Valderrama MD

## 2022-01-17 ENCOUNTER — OFFICE VISIT (OUTPATIENT)
Dept: ENT CLINIC | Age: 62
End: 2022-01-17
Payer: COMMERCIAL

## 2022-01-17 ENCOUNTER — TELEPHONE (OUTPATIENT)
Dept: ENT CLINIC | Age: 62
End: 2022-01-17

## 2022-01-17 VITALS
BODY MASS INDEX: 26.33 KG/M2 | SYSTOLIC BLOOD PRESSURE: 117 MMHG | WEIGHT: 158 LBS | DIASTOLIC BLOOD PRESSURE: 76 MMHG | HEIGHT: 65 IN | HEART RATE: 80 BPM

## 2022-01-17 DIAGNOSIS — J01.01 RECURRENT MAXILLARY SINUSITIS: Primary | ICD-10-CM

## 2022-01-17 DIAGNOSIS — Z79.899 HIGH RISK MEDICATION USE: ICD-10-CM

## 2022-01-17 DIAGNOSIS — J31.0 CHRONIC RHINITIS: ICD-10-CM

## 2022-01-17 LAB
A/G RATIO: 1.8 (ref 1.1–2.2)
ALBUMIN SERPL-MCNC: 4.3 G/DL (ref 3.4–5)
ALP BLD-CCNC: 100 U/L (ref 40–129)
ALT SERPL-CCNC: 19 U/L (ref 10–40)
ANION GAP SERPL CALCULATED.3IONS-SCNC: 13 MMOL/L (ref 3–16)
AST SERPL-CCNC: 20 U/L (ref 15–37)
BASOPHILS ABSOLUTE: 0.1 K/UL (ref 0–0.2)
BASOPHILS RELATIVE PERCENT: 1.2 %
BILIRUB SERPL-MCNC: <0.2 MG/DL (ref 0–1)
BUN BLDV-MCNC: 22 MG/DL (ref 7–20)
C-REACTIVE PROTEIN: <3 MG/L (ref 0–5.1)
CALCIUM SERPL-MCNC: 9.6 MG/DL (ref 8.3–10.6)
CHLORIDE BLD-SCNC: 105 MMOL/L (ref 99–110)
CO2: 25 MMOL/L (ref 21–32)
CREAT SERPL-MCNC: 0.8 MG/DL (ref 0.6–1.1)
EOSINOPHILS ABSOLUTE: 0.2 K/UL (ref 0–0.6)
EOSINOPHILS RELATIVE PERCENT: 4 %
GFR AFRICAN AMERICAN: >60
GFR NON-AFRICAN AMERICAN: >60
GLUCOSE BLD-MCNC: 95 MG/DL (ref 70–99)
HCT VFR BLD CALC: 42.3 % (ref 36–48)
HEMOGLOBIN: 13.6 G/DL (ref 12–16)
LYMPHOCYTES ABSOLUTE: 1.5 K/UL (ref 1–5.1)
LYMPHOCYTES RELATIVE PERCENT: 30.5 %
MCH RBC QN AUTO: 26.5 PG (ref 26–34)
MCHC RBC AUTO-ENTMCNC: 32.2 G/DL (ref 31–36)
MCV RBC AUTO: 82.3 FL (ref 80–100)
MONOCYTES ABSOLUTE: 0.5 K/UL (ref 0–1.3)
MONOCYTES RELATIVE PERCENT: 10.3 %
NEUTROPHILS ABSOLUTE: 2.6 K/UL (ref 1.7–7.7)
NEUTROPHILS RELATIVE PERCENT: 54 %
PDW BLD-RTO: 13.6 % (ref 12.4–15.4)
PLATELET # BLD: 288 K/UL (ref 135–450)
PMV BLD AUTO: 8.5 FL (ref 5–10.5)
POTASSIUM SERPL-SCNC: 4.4 MMOL/L (ref 3.5–5.1)
RBC # BLD: 5.15 M/UL (ref 4–5.2)
SEDIMENTATION RATE, ERYTHROCYTE: 11 MM/HR (ref 0–30)
SODIUM BLD-SCNC: 143 MMOL/L (ref 136–145)
TOTAL PROTEIN: 6.7 G/DL (ref 6.4–8.2)
WBC # BLD: 4.8 K/UL (ref 4–11)

## 2022-01-17 PROCEDURE — 31231 NASAL ENDOSCOPY DX: CPT | Performed by: OTOLARYNGOLOGY

## 2022-01-17 PROCEDURE — 99203 OFFICE O/P NEW LOW 30 MIN: CPT | Performed by: OTOLARYNGOLOGY

## 2022-01-17 RX ORDER — AZELASTINE 1 MG/ML
2 SPRAY, METERED NASAL 2 TIMES DAILY
Qty: 1 EACH | Refills: 11 | Status: SHIPPED | OUTPATIENT
Start: 2022-01-17 | End: 2023-01-17

## 2022-01-17 ASSESSMENT — ENCOUNTER SYMPTOMS
CHEST TIGHTNESS: 0
CHOKING: 0
WHEEZING: 0
NAUSEA: 0
RHINORRHEA: 0
VOMITING: 0
EYE PAIN: 0
TROUBLE SWALLOWING: 0
VOICE CHANGE: 0
STRIDOR: 0
SORE THROAT: 0
CONSTIPATION: 0
BLOOD IN STOOL: 0
APNEA: 0
SINUS PAIN: 0
FACIAL SWELLING: 0
BACK PAIN: 0
EYE DISCHARGE: 0
DIARRHEA: 0
COLOR CHANGE: 0
SINUS PRESSURE: 0
COUGH: 0
SHORTNESS OF BREATH: 0

## 2022-01-17 NOTE — LETTER
[facility]  [address] [place] [address]  Phone: [phone]  Fax: [phone]           Mitzi Bridges MD      January 17, 2022     Patient: Uriel Joaquin   MR Number: [id number]   YOB: 1960   Date of Visit: 1/17/2022       Dear Dr. Altman Height: Thank you for referring Osvaldo Joaquin to me for evaluation/treatment. Below are the relevant portions of my assessment and plan of care. Diagnosis Orders   1. Recurrent maxillary sinusitis  VT ALLERGY SKIN TESTS    VT INTRACUTANEOUS TESTS W/ALLERGENIC EXTRACTS    CT SINUS WO CONTRAST    azelastine (ASTELIN) 0.1 % nasal spray   2. Chronic rhinitis  VT ALLERGY SKIN TESTS    VT INTRACUTANEOUS TESTS W/ALLERGENIC EXTRACTS    CT SINUS WO CONTRAST    azelastine (ASTELIN) 0.1 % nasal spray         Start azelastine. CT sinuses. Call with results. Allergy testing. Follow up after     If you have questions, please do not hesitate to call me. I look forward to following Suzi Alvarez along with you. Sincerely,        Mitzi Bridges MD    CC providers:  Tim Gallardo MD  [address]

## 2022-01-17 NOTE — PROGRESS NOTES
Subjective:      Patient ID: Bernadette Corcoran is a 63 y.o. female. HPI  Chief Complaint   Patient presents with    Recurrent sinusitis  History of Present Rosaline Ball is a(n) 63 y.o. female who presents with a 20 year history of recurrent sinus infections. 2-3 times a year. Requires antibiotics and steroids. Had septoplasty 20 years ago. Allergy tested. Some molds and tress. No shots. Nasal Discharge: clear  Nasal Obstruction: No   Post Nasal Drainage: Yes  Nasal Bleeding: No  Sense of Smell: normal  Allergy Symptoms:  Patient complains of a 20 year history of moderate postnasal drip and sinus pressure. She denies purulent nasal discharge and sputum, fevers, lymphadenopathy, and sore throat. These symptoms are seasonal. Current triggers include: no known precipitant. She has tried intranasal steroid- flonase. Immunotherapy has never been tried. The patient has no history of asthma. .  The patient has no history of eczema. .  She has not had sinus surgery in the past. Symptoms show no change over time. Current allergist:  No.  History of Facial/Head Trauma: No  Pain/Discomfort:No  Headaches: none  Aspirin Sensitivity: No  Eye Symptoms: none  Previous Treatments: As above plus singulair and mucinex.           Patient Active Problem List   Diagnosis    Vitamin D deficiency    Neoplasm of uncertain behavior of skin    Pituitary adenoma ([address]    Hemifacial spasm    Solar lentigo    Personal history of malignant neoplasm of ovary    Irritable bowel syndrome    Anxiety    Female infertility    Arthritis    Atypical nevi    Actinic keratosis    Psoriasis    Unspecified polyarthropathy or polyarthritis, site unspecified    Hearing loss    Essential hypertension    Mixed hyperlipidemia    Seasonal allergic rhinitis due to pollen    Other constipation    Restless legs syndrome (RLS)    Psoriatic arthritis (HCC)     Past Surgical History:   Procedure Laterality Date    BRAIN SURGERY      for blood vessel abnormality    BREAST REDUCTION SURGERY       SECTION      COLONOSCOPY      FINGER SURGERY  2007    Left index finger     HYSTERECTOMY      INNER EAR SURGERY  2008    repair artery right ear    NEUROMA SURGERY      microvascular reconstruction to fix hemifacial spasm    RHINOPLASTY      SHOULDER SURGERY      left shoulder    AMRITA AND BSO      TOE SURGERY      TONSILLECTOMY      VARICOSE VEIN SURGERY      WRIST SURGERY      right wrist     Family History   Problem Relation Age of Onset    Heart Disease Father         chf    High Blood Pressure Father     High Cholesterol Father     Diabetes Father     Kidney Disease Father     High Blood Pressure Brother     Depression Brother     Diabetes Brother     Cancer Paternal Grandmother     Stroke Paternal Grandmother     Stroke Paternal Grandfather     Cancer Mother         skin    Cancer Sister         breast       Social History     Socioeconomic History    Marital status:      Spouse name: Not on file    Number of children: Not on file    Years of education: Not on file    Highest education level: Not on file   Occupational History    Not on file   Tobacco Use    Smoking status: Never Smoker    Smokeless tobacco: Never Used   Substance and Sexual Activity    Alcohol use: Yes    Drug use: No    Sexual activity: Yes   Other Topics Concern    Not on file   Social History Narrative    Not on file     Social Determinants of Health     Financial Resource Strain: Low Risk     Difficulty of Paying Living Expenses: Not hard at all   Food Insecurity: No Food Insecurity    Worried About Running Out of Food in the Last Year: Never true    [address] in the Last Year: Never true   Transportation Needs:     Lack of Transportation (Medical): Not on file    Lack of Transportation (Non-Medical):  Not on file   Physical Activity:     Days of Exercise per Week: Not on file    Minutes of Exercise per Session: Not on file   Stress:     Feeling of Stress : Not on file   Social Connections:     Frequency of Communication with Friends and Family: Not on file    Frequency of Social Gatherings with Friends and Family: Not on file    Attends Confucianism Services: Not on file    Active Member of Clubs or Organizations: Not on file    Attends Club or Organization Meetings: Not on file    Marital Status: Not on file   Intimate Partner Violence:     Fear of Current or Ex-Partner: Not on file    Emotionally Abused: Not on file    Physically Abused: Not on file    Sexually Abused: Not on file   Housing Stability:     Unable to Pay for Housing in the Last Year: Not on file    Number of [place] in the Last Year: Not on file    Unstable Housing in the Last Year: Not on file       DRUG/FOOD ALLERGIES: Sulfa antibiotics and Codeine    CURRENT MEDICATIONS  Prior to Admission medications    Medication Sig Start Date End Date Taking? Authorizing Provider   atorvastatin (LIPITOR) 20 MG tablet TAKE 1 TABLET DAILY 11/23/21  Yes Bradford Saleh MD   fluocinolone (DERMA-SMOOTHE/FS SCALP) 0.01 % external oil Apply topically at bedtime with cap as directed. Wash in the morning. 11/8/21  Yes Britany Mary MD   clobetasol (TEMOVATE) 0.05 % external solution Apply topically 2 times daily. 11/8/21  Yes Britany Mary MD   clobetasol (TEMOVATE) 0.05 % ointment Apply to affected area BID PRN flares 11/8/21  Yes Britany Mary MD   CLOBEX SPRAY 0.05 % LIQD Apply to scalp daily - bid prn flares. 11/8/21  Yes Makeda Espinal MD   SORILUX 0.005 % FOAM APPLY EXTERNALLY TO THE SCALP DAILY FOR PSORIASIS 11/8/21  Yes Britany Mary MD   tretinoin (RETIN-A) 0.05 % cream Apply a pea sized amount to the face QHS 11/8/21  Yes Britany Mary MD   ENBREL SURECLICK 50 MG/ML SOAJ INJECT 1 PEN UNDER THE SKIN EVERY 7 DAYS.  10/6/21  Yes Gretel Duverney, MD   lisinopril (PRINIVIL;ZESTRIL) 20 MG tablet TAKE 1 TABLET DAILY 3/18/21 Yes Elva Stacy MD   linaCLOtide (LINZESS) 72 MCG CAPS capsule Take 1 capsule by mouth every morning (before breakfast) 3/4/21  Yes Elva Stacy MD   meloxicam (MOBIC) 15 MG tablet TAKE 1 TABLET DAILY 11/16/20  Yes Audrey Castillo MD   hydroCHLOROthiazide (HYDRODIURIL) 12.5 MG tablet Take 1 tablet by mouth daily 10/9/20  Yes Elva Stacy MD   zinc sulfate (ZINCATE) 220 (50 Zn) MG capsule Take 50 mg by mouth daily   Yes Historical Provider, MD   Estradiol (YUVAFEM) 10 MCG TABS vaginal tablet INSERT 1 TABLET VAGINALLY TWICE WEEKLY 7/5/18  Yes Historical Provider, MD   fluticasone (FLONASE) 50 MCG/ACT nasal spray 1 spray by Nasal route daily   Yes Historical Provider, MD   Ascorbic Acid (VITAMIN C) 500 MG tablet Take 1,000 mg by mouth daily. Yes Historical Provider, MD   Cholecalciferol (VITAMIN D) 1000 UNIT CAPS Take 1 capsule by mouth daily. 2/28/11  Yes Historical Provider, MD   Flaxseed, Linseed, (FLAXSEED OIL) 1000 MG CAPS Take  by mouth. Yes Historical Provider, MD   Michail Sheerer Tea 250 MG CAPS Take  by mouth daily. 6/28/10  Yes Historical Provider, MD   PROBIOTIC CAPS Take  by mouth daily. 6/28/10  Yes Historical Provider, MD   clonazePAM (KLONOPIN) 0.5 MG tablet Take 1 tablet by mouth daily as needed for Anxiety for up to 30 days.  12/16/21 1/15/22  Vania Melendez MD       Lab Studies:  Lab Results   Component Value Date    WBC 5.0 09/15/2021    HGB 14.1 09/15/2021    HCT 43.8 09/15/2021    MCV 82.2 09/15/2021     09/15/2021     Lab Results   Component Value Date    GLUCOSE 99 09/15/2021    BUN 20 09/15/2021    CREATININE 0.7 09/15/2021    K 4.4 09/15/2021     09/15/2021     09/15/2021    CALCIUM 9.9 09/15/2021     No results found for: MG  Lab Results   Component Value Date    PHOS 4.3 06/17/2015     Lab Results   Component Value Date    ALKPHOS 93 09/15/2021    ALT 21 09/15/2021    AST 21 09/15/2021    BILITOT 0.5 09/15/2021    PROT 6.9 09/15/2021     Review of Systems Constitutional: Negative for activity change, appetite change, chills, fatigue and fever. HENT: Positive for postnasal drip. Negative for congestion, dental problem, drooling, ear discharge, ear pain, facial swelling, hearing loss, mouth sores, nosebleeds, rhinorrhea, sinus pressure, sinus pain, sneezing, sore throat, tinnitus, trouble swallowing and voice change. Eyes: Negative for pain, discharge and visual disturbance. Respiratory: Negative for apnea, cough, choking, chest tightness, shortness of breath, wheezing and stridor. Cardiovascular: Negative for palpitations. Gastrointestinal: Negative for blood in stool, constipation, diarrhea, nausea and vomiting. Endocrine: Negative for cold intolerance, heat intolerance, polydipsia, polyphagia and polyuria. Musculoskeletal: Negative for back pain, gait problem, neck pain and neck stiffness. Skin: Negative for color change, pallor, rash and wound. Allergic/Immunologic: Negative for environmental allergies, food allergies and immunocompromised state. Neurological: Negative for dizziness, facial asymmetry, speech difficulty, light-headedness, numbness and headaches. Hematological: Negative for adenopathy. Does not bruise/bleed easily. Psychiatric/Behavioral: Negative for agitation, confusion, self-injury and sleep disturbance. The patient is not nervous/anxious. Objective:   Physical Exam  Constitutional:       Appearance: She is well-developed. She is not ill-appearing. HENT:      Head: Normocephalic and atraumatic. Not macrocephalic and not microcephalic. No raccoon eyes, Escalera's sign, abrasion, contusion, right periorbital erythema, left periorbital erythema or laceration. Hair is normal.      Jaw: No trismus. Salivary Glands: Right salivary gland is not diffusely enlarged. Left salivary gland is not diffusely enlarged. Right Ear: Hearing, tympanic membrane and external ear normal. No decreased hearing noted.  No drainage, swelling or tenderness. No middle ear effusion. No mastoid tenderness. Tympanic membrane is not perforated, retracted or bulging. Tympanic membrane has normal mobility. Left Ear: Hearing, tympanic membrane and external ear normal. No decreased hearing noted. No drainage, swelling or tenderness. No middle ear effusion. No mastoid tenderness. Tympanic membrane is not perforated, retracted or bulging. Tympanic membrane has normal mobility. Ears:      Valdovinos exam findings: does not lateralize. Right Rinne: AC > BC. Left Rinne: AC > BC. Nose: No nasal deformity, septal deviation, laceration, mucosal edema or rhinorrhea. Right Nostril: No epistaxis. Left Nostril: No epistaxis. Right Turbinates: Enlarged. Left Turbinates: Enlarged. Right Sinus: No maxillary sinus tenderness or frontal sinus tenderness. Left Sinus: No maxillary sinus tenderness or frontal sinus tenderness. Mouth/Throat:      Lips: No lesions. Mouth: Mucous membranes are not pale, not dry and not cyanotic. No lacerations or oral lesions. Dentition: Normal dentition. No dental caries or dental abscesses. Tongue: No lesions. Palate: No mass. Pharynx: Uvula midline. No oropharyngeal exudate, posterior oropharyngeal erythema or uvula swelling. Tonsils: No tonsillar abscesses. Eyes:      General: Lids are normal. Lids are everted, no foreign bodies appreciated. Right eye: No discharge. Left eye: No discharge. Extraocular Movements:      Right eye: Normal extraocular motion and no nystagmus. Left eye: Normal extraocular motion and no nystagmus. Conjunctiva/sclera:      Right eye: No chemosis or exudate. Left eye: No chemosis or exudate. Neck:      Thyroid: No thyroid mass or thyromegaly. Vascular: Normal carotid pulses. Trachea: Trachea normal. No tracheal deviation.    Cardiovascular:      Rate and Rhythm: Normal rate and regular rhythm. Pulmonary:      Effort: No tachypnea, bradypnea or respiratory distress. Breath sounds: No stridor. Musculoskeletal:      Right shoulder: Normal range of motion. Left shoulder: Normal range of motion. Cervical back: Neck supple. Lymphadenopathy:      Head:      Right side of head: No submental, submandibular, tonsillar, preauricular, posterior auricular or occipital adenopathy. Left side of head: No submental, submandibular, tonsillar, preauricular, posterior auricular or occipital adenopathy. Cervical:      Right cervical: No superficial, deep or posterior cervical adenopathy. Left cervical: No superficial, deep or posterior cervical adenopathy. Skin:     Findings: No bruising, erythema, laceration or lesion. Neurological:      Mental Status: She is alert and oriented to person, place, and time. Psychiatric:         Speech: Speech normal.         Behavior: Behavior normal.           Due to the patients chronic sinus disease and/or history of sinonasal neoplasm for surveillance a nasal endoscopy with or without debridement will be performed to complete a significant physical examination of the patient which cannot be performed by anterior rhinoscopy alone (failure of complete examination of the paranasal sinuses). Failure to provide this procedure may lead to late detection of significant chronic benign disease, acute exacerbation, resolution or failure of early diagnosis of recurrent cancer. The procedure report is present in the body of the chart. Nasal Endoscopy    Pre OP: recurrent sinusitis  Post OP: same and rhinitis  Reason: recurrent sinusitis  Procedure: Nasal endoscopy  Surgeon: Shauna Abrams  Anesthesia: Afrin with 2% lidocaine  Estimated Blood Loss: None      After obtaining verbal consent from the patient 1% lidocaine with afrin was sprayed into the nasal cavities.   After allowing a time for anesthesia, a nasal endoscope was placed into the nostril. The septum, inferior, and middle turbinates were examined. The middle meatus, and sphenoethmoid recess was examined bilaterally. Cultures were not obtained from the sinuses. There were no complications. Pertinent positives included: There was not edema and purulence in the left middle meatus. There was not edema and purulence at the right middle meatus. Polyps were not identified in the sinuses. Masses were not identified. Tolerated well without complication. I attest that I was present for and did the entire procedure myself. Assessment:       Diagnosis Orders   1. Recurrent maxillary sinusitis  CA ALLERGY SKIN TESTS    [facility] INTRACUTANEOUS TESTS W/ALLERGENIC EXTRACTS    CT SINUS WO CONTRAST    azelastine (ASTELIN) 0.1 % nasal spray   2. Chronic rhinitis  CA ALLERGY SKIN TESTS    [facility] INTRACUTANEOUS TESTS W/ALLERGENIC EXTRACTS    CT SINUS WO CONTRAST    azelastine (ASTELIN) 0.1 % nasal spray           Plan:      Start azelastine. CT sinuses. Call with results. Allergy testing.    Follow up after         Justin Ching MD

## 2022-01-17 NOTE — TELEPHONE ENCOUNTER
Called patient to set up for allergy testing. Pt takes Klonopin daily and is not a candidate for skin testing. She will need RAST blood testing instead. Pt made aware and she will go to the lab to have blood testing done once orders are placed.

## 2022-01-24 ENCOUNTER — TELEPHONE (OUTPATIENT)
Dept: ENT CLINIC | Age: 62
End: 2022-01-24

## 2022-01-24 DIAGNOSIS — J30.9 ALLERGIC RHINITIS, UNSPECIFIED SEASONALITY, UNSPECIFIED TRIGGER: Primary | ICD-10-CM

## 2022-01-24 NOTE — TELEPHONE ENCOUNTER
Patient informed that orders for RAST testing were in computer and that she could get her blood drawn anytime. Patient stated that she would be going in early February.

## 2022-02-02 ENCOUNTER — PATIENT MESSAGE (OUTPATIENT)
Dept: INTERNAL MEDICINE CLINIC | Age: 62
End: 2022-02-02

## 2022-02-02 RX ORDER — MONTELUKAST SODIUM 10 MG/1
TABLET ORAL
COMMUNITY
Start: 2021-11-01 | End: 2022-02-02 | Stop reason: SDUPTHER

## 2022-02-02 RX ORDER — MONTELUKAST SODIUM 10 MG/1
10 TABLET ORAL NIGHTLY
Qty: 90 TABLET | Refills: 1 | Status: SHIPPED | OUTPATIENT
Start: 2022-02-02 | End: 2022-05-03

## 2022-02-02 NOTE — TELEPHONE ENCOUNTER
From: Karl Lee  To: Dr. Rosy Parsons: 2/2/2022 3:23 PM EST  Subject: Refill    Can you please send a 90 day prescription for Montelukast 10 mg to [facility] long term prescription provider for me? It's for my allergies. I am about to run out.     Thanks,  Rand Schroeder

## 2022-02-23 ENCOUNTER — PATIENT MESSAGE (OUTPATIENT)
Dept: DERMATOLOGY | Age: 62
End: 2022-02-23

## 2022-02-23 RX ORDER — CALCIPOTRIENE 50 UG/G
AEROSOL, FOAM TOPICAL
Qty: 120 G | Refills: 5 | Status: SHIPPED | OUTPATIENT
Start: 2022-02-23

## 2022-02-23 NOTE — TELEPHONE ENCOUNTER
From: Bryan Fiore  To: Dr. Darlene Whiteside: 2/23/2022 10:25 AM EST  Subject: Need Refill    Can you call a refillable prescription for Sorilux Foam 0.005% to [facility] on [address]?     Thanks

## 2022-02-25 DIAGNOSIS — J30.9 ALLERGIC RHINITIS, UNSPECIFIED SEASONALITY, UNSPECIFIED TRIGGER: ICD-10-CM

## 2022-03-02 RX ORDER — ETANERCEPT 50 MG/ML
SOLUTION SUBCUTANEOUS
Qty: 4 EACH | Refills: 2 | Status: SHIPPED | OUTPATIENT
Start: 2022-03-02 | End: 2022-06-20 | Stop reason: SDUPTHER

## 2022-03-03 ENCOUNTER — HOSPITAL ENCOUNTER (OUTPATIENT)
Dept: CT IMAGING | Age: 62
Discharge: HOME | End: 2022-03-03
Payer: COMMERCIAL

## 2022-03-03 ENCOUNTER — TELEPHONE (OUTPATIENT)
Dept: DERMATOLOGY | Age: 62
End: 2022-03-03

## 2022-03-03 DIAGNOSIS — J31.0 CHRONIC RHINITIS: ICD-10-CM

## 2022-03-03 DIAGNOSIS — J01.01 RECURRENT MAXILLARY SINUSITIS: ICD-10-CM

## 2022-03-03 LAB
ALLERGEN CANDIDA ALBICANS: <0.1 KU/L
ALLERGEN DUCK FEATHERS IGE: <0.1 KU/L
ALLERGEN ENGLISH PLANTAIN: <0.1 KU/L
ALLERGEN GOOSE FEATHERS IGE: <0.1 KU/L
ALLERGEN LAMB'S QUARTERS: <0.1 KU/L
ALLERGEN SEE NOTE: NORMAL
AUREOBASIDIUM PULLULANS: <0.1 KU/L
EPICOCCUM PURPURASCENS: <0.1 KU/L
FUSARIUM MONILIFORME: <0.1 KU/L
Lab: <0.1 KU/L
Lab: <0.1 KU/L

## 2022-03-03 PROCEDURE — 70486 CT MAXILLOFACIAL W/O DYE: CPT

## 2022-03-03 NOTE — TELEPHONE ENCOUNTER
Spoke to pharmacist regarding coverage for Sorilux foam.     Generic sorilux-calcipotriene does not come in a foam, only solution    Calcipotriene topical Dosage forms: topical cream (0.005%); topical foam (0.005%); topical ointment (0.005%); topical solution (0.005%)    Ok to change prescription?

## 2022-03-04 ENCOUNTER — TELEPHONE (OUTPATIENT)
Dept: ENT CLINIC | Age: 62
End: 2022-03-04

## 2022-03-04 LAB
2000687N OAK TREE IGE: <0.1 KU/L (ref 0–0.34)
ALLERGEN BERMUDA GRASS IGE: <0.1 KU/L (ref 0–0.34)
ALLERGEN BIRCH IGE: <0.1 KU/L (ref 0–0.34)
ALLERGEN DOG DANDER IGE: <0.1 KU/L (ref 0–0.34)
ALLERGEN GERMAN COCKROACH IGE: <0.1 KU/L (ref 0–0.34)
ALLERGEN HORMODENDRUM IGE: <0.1 KUL/L (ref 0–0.34)
ALLERGEN MOUSE EPITHELIA IGE: <0.1 KU/L (ref 0–0.34)
ALLERGEN PECAN TREE IGE: <0.1 KU/L (ref 0–0.34)
ALLERGEN PIGWEED ROUGH IGE: <0.1 KU/L (ref 0–0.34)
ALLERGEN SHEEP SORREL (W18) IGE: <0.1 KU/L (ref 0–0.34)
ALLERGEN TREE SYCAMORE: <0.1 KU/L (ref 0–0.34)
ALLERGEN WALNUT TREE IGE: <0.1 KU/L (ref 0–0.34)
ALLERGEN WHITE MULBERRY TREE, IGE: <0.1 KU/L (ref 0–0.34)
ALLERGEN, TREE, WHITE ASH IGE: <0.1 KU/L (ref 0–0.34)
ALTERNARIA ALTERNATA: <0.1 KU/L (ref 0–0.34)
ASPERGILLUS FUMIGATUS: <0.1 KU/L (ref 0–0.34)
CAT DANDER ANTIBODY: <0.1 KU/L (ref 0–0.34)
COTTONWOOD TREE: <0.1 KU/L (ref 0–0.34)
D. FARINAE: <0.1 KU/L (ref 0–0.34)
D. PTERONYSSINUS: <0.1 KU/L (ref 0–0.34)
ELM TREE: <0.1 KU/L (ref 0–0.34)
IGE: 12 IU/ML
MAPLE/BOXELDER TREE: <0.1 KU/L (ref 0–0.34)
MOUNTAIN CEDAR TREE: <0.1 KU/L (ref 0–0.34)
MUCOR RACEMOSUS: <0.1 KU/L (ref 0–0.34)
P. NOTATUM: <0.1 KU/L (ref 0–0.34)
RUSSIAN THISTLE: <0.1 KU/L (ref 0–0.34)
SHORT RAGWD(A ARTEMIS.) IGE: <0.1 KU/L (ref 0–0.34)
TIMOTHY GRASS: <0.1 KU/L (ref 0–0.34)

## 2022-03-04 NOTE — TELEPHONE ENCOUNTER
----- Message from Denia Bowens MD sent at 3/4/2022  7:26 AM EST -----  Please let Ms. Ofelia Yost know that her allergy testing thus far is negative. Her CT sinuses are normal. Ill discuss further at her next appointment game plans.

## 2022-03-09 NOTE — TELEPHONE ENCOUNTER
LVM for patient to return call. Patient spoke to someone at the pharmacy and they ordered her medication. Any issues getting the medication, patient will call our office.

## 2022-03-09 NOTE — TELEPHONE ENCOUNTER
If you will let her know that the pharmacy is having trouble filling the sorilux, we can change it to [address] (capped at $65 I believe) or try to change to generic but may be a different form.

## 2022-03-15 ENCOUNTER — TELEPHONE (OUTPATIENT)
Dept: ENT CLINIC | Age: 62
End: 2022-03-15

## 2022-03-15 LAB
Lab: NORMAL
REPORT: NORMAL
THIS TEST SENT TO: NORMAL

## 2022-03-15 NOTE — TELEPHONE ENCOUNTER
----- Message from Miya Paniagua MD sent at 3/15/2022 11:46 AM EDT -----  Please let patient know her IgE levels are normal. I could refer her to an allergist immunologist to look into other disease states that may be causing her itching and congestion if she would like. I would just need to know what area of town she live  s.

## 2022-03-15 NOTE — TELEPHONE ENCOUNTER
Patient returned call. She needs clarification regarding message that was left stating that next step is to see immunologist. Also, confirming whether or not she needs her Thursday, 3/17 appointment.

## 2022-03-16 ENCOUNTER — TELEPHONE (OUTPATIENT)
Dept: INTERNAL MEDICINE CLINIC | Age: 62
End: 2022-03-16

## 2022-03-16 RX ORDER — AMOXICILLIN AND CLAVULANATE POTASSIUM 875; 125 MG/1; MG/1
1 TABLET, FILM COATED ORAL 2 TIMES DAILY
Qty: 10 TABLET | Refills: 0 | Status: SHIPPED | OUTPATIENT
Start: 2022-03-16 | End: 2022-03-21

## 2022-03-16 RX ORDER — FLUCONAZOLE 150 MG/1
150 TABLET ORAL
Qty: 2 TABLET | Refills: 0 | Status: SHIPPED | OUTPATIENT
Start: 2022-03-16 | End: 2022-03-22

## 2022-03-16 NOTE — TELEPHONE ENCOUNTER
----- Message from Jacinto Dalal sent at 3/16/2022  8:46 AM EDT -----  Subject: Message to Provider    QUESTIONS  Information for Provider? pt would like Augmentin called in for her for   her annual sinus infection she is experiencing right now. Please call to   advise if this can be done. She said she spoke to with her in the office   about having these. Pt says that she wants Diflucan with that because she   usually gets a yeast infection with antibiotics. [facility]   [address] [place] [address], [address] - P [phone] - F [phone].     ---------------------------------------------------------------------------  --------------  Richar BAIN  What is the best way for the office to contact you? OK to leave message on   voicemail  Preferred Call Back Phone Number? [phone]  ---------------------------------------------------------------------------  --------------  SCRIPT ANSWERS  Relationship to Patient?  Self

## 2022-03-17 ENCOUNTER — OFFICE VISIT (OUTPATIENT)
Dept: ENT CLINIC | Age: 62
End: 2022-03-17
Payer: COMMERCIAL

## 2022-03-17 VITALS
SYSTOLIC BLOOD PRESSURE: 115 MMHG | BODY MASS INDEX: 26.33 KG/M2 | WEIGHT: 158 LBS | HEART RATE: 77 BPM | HEIGHT: 65 IN | DIASTOLIC BLOOD PRESSURE: 72 MMHG

## 2022-03-17 DIAGNOSIS — J30.9 ALLERGIC RHINITIS, UNSPECIFIED SEASONALITY, UNSPECIFIED TRIGGER: Primary | ICD-10-CM

## 2022-03-17 DIAGNOSIS — J31.0 CHRONIC RHINITIS: ICD-10-CM

## 2022-03-17 DIAGNOSIS — D32.9 MENINGIOMA (HCC): ICD-10-CM

## 2022-03-17 DIAGNOSIS — J01.01 RECURRENT MAXILLARY SINUSITIS: ICD-10-CM

## 2022-03-17 PROCEDURE — 31231 NASAL ENDOSCOPY DX: CPT | Performed by: OTOLARYNGOLOGY

## 2022-03-17 PROCEDURE — 99214 OFFICE O/P EST MOD 30 MIN: CPT | Performed by: OTOLARYNGOLOGY

## 2022-03-17 NOTE — PROGRESS NOTES
Subjective:      Patient ID: Evaristo Williamson is a 63 y.o. female. HPI  Chief Complaint   Patient presents with    Recurrent sinusitis  History of Present Amalia Peterson is a(n) 63 y.o. female who presents with a 20 year history of recurrent sinus infections. 2-3 times a year. Requires antibiotics and steroids. Had septoplasty 20 years ago. Allergy tested. Some molds and tress. No shots. Nasal Discharge: clear  Nasal Obstruction: No   Post Nasal Drainage: Yes  Nasal Bleeding: No  Sense of Smell: normal  Allergy Symptoms:  Patient complains of a 20 year history of moderate postnasal drip and sinus pressure. She denies purulent nasal discharge and sputum, fevers, lymphadenopathy, and sore throat. These symptoms are seasonal. Current triggers include: no known precipitant. She has tried intranasal steroid- flonase. Immunotherapy has never been tried. The patient has no history of asthma. .  The patient has no history of eczema. .  She has not had sinus surgery in the past. Symptoms show no change over time. Current allergist:  No.  History of Facial/Head Trauma: No  Pain/Discomfort:No  Headaches: none  Aspirin Sensitivity: No  Eye Symptoms: none  Previous Treatments: As above plus singulair and mucinex. Reviewed CT results. Normal sinuses. Likely cerebellar meningioma. MRI recommended. Allergy testing normal. Discussed medications with non allergic rhinitis. Discussed allergy immunology. Patient agreed to proceed. Referral to neurosurgery.        Patient Active Problem List   Diagnosis    Vitamin D deficiency    Neoplasm of uncertain behavior of skin    Pituitary adenoma ([facility] [address]    Hemifacial spasm    Solar lentigo    Personal history of malignant neoplasm of ovary    Irritable bowel syndrome    Anxiety    Female infertility    Arthritis    Atypical nevi    Actinic keratosis    Psoriasis    Unspecified polyarthropathy or polyarthritis, site unspecified    Hearing loss    Essential hypertension  Mixed hyperlipidemia    Seasonal allergic rhinitis due to pollen    Other constipation    Restless legs syndrome (RLS)    Psoriatic arthritis (HCC)     Past Surgical History:   Procedure Laterality Date    BRAIN SURGERY      for blood vessel abnormality    BREAST REDUCTION SURGERY       SECTION      COLONOSCOPY  12    FINGER SURGERY  2007    Left index finger     HYSTERECTOMY      INNER EAR SURGERY  2008    repair artery right ear    NEUROMA SURGERY      microvascular reconstruction to fix hemifacial spasm    RHINOPLASTY      SHOULDER SURGERY      left shoulder    AMRITA AND BSO      TOE SURGERY      TONSILLECTOMY      VARICOSE VEIN SURGERY      WRIST SURGERY      right wrist     Family History   Problem Relation Age of Onset    Heart Disease Father         chf    High Blood Pressure Father     High Cholesterol Father     Diabetes Father     Kidney Disease Father     High Blood Pressure Brother     Depression Brother     Diabetes Brother     Cancer Paternal Grandmother     Stroke Paternal Grandmother     Stroke Paternal Grandfather     Cancer Mother         skin    Cancer Sister         breast       Social History     Socioeconomic History    Marital status:      Spouse name: Not on file    Number of children: Not on file    Years of education: Not on file    Highest education level: Not on file   Occupational History    Not on file   Tobacco Use    Smoking status: Never Smoker    Smokeless tobacco: Never Used   Substance and Sexual Activity    Alcohol use:  Yes    Drug use: No    Sexual activity: Yes   Other Topics Concern    Not on file   Social History Narrative    Not on file     Social Determinants of Health     Financial Resource Strain: Low Risk     Difficulty of Paying Living Expenses: Not hard at all   Food Insecurity: No Food Insecurity    Worried About [email or web address] in the Last Year: Never true    [address] in the Last Year: Never true   Transportation Needs:     Lack of Transportation (Medical): Not on file    Lack of Transportation (Non-Medical): Not on file   Physical Activity:     Days of Exercise per Week: Not on file    Minutes of Exercise per Session: Not on file   Stress:     Feeling of Stress : Not on file   Social Connections:     Frequency of Communication with Friends and Family: Not on file    Frequency of Social Gatherings with Friends and Family: Not on file    Attends Holiness Services: Not on file    Active Member of Clubs or Organizations: Not on file    Attends Club or Organization Meetings: Not on file    Marital Status: Not on file   Intimate Partner Violence:     Fear of Current or Ex-Partner: Not on file    Emotionally Abused: Not on file    Physically Abused: Not on file    Sexually Abused: Not on file   Housing Stability:     Unable to Pay for Housing in the Last Year: Not on file    Number of [place] in the Last Year: Not on file    Unstable Housing in the Last Year: Not on file       DRUG/FOOD ALLERGIES: Sulfa antibiotics and Codeine    CURRENT MEDICATIONS  Prior to Admission medications    Medication Sig Start Date End Date Taking? Authorizing Provider   atorvastatin (LIPITOR) 20 MG tablet TAKE 1 TABLET DAILY 11/23/21  Yes Justo Joel MD   fluocinolone (DERMA-SMOOTHE/FS SCALP) 0.01 % external oil Apply topically at bedtime with cap as directed. Wash in the morning. 11/8/21  Yes Nina Galloway MD   clobetasol (TEMOVATE) 0.05 % external solution Apply topically 2 times daily. 11/8/21  Yes Nina Galloway MD   clobetasol (TEMOVATE) 0.05 % ointment Apply to affected area BID PRN flares 11/8/21  Yes Nina Galloway MD   CLOBEX SPRAY 0.05 % LIQD Apply to scalp daily - bid prn flares.  11/8/21  Yes Nina Galloway MD   SORILUX 0.005 % FOAM APPLY EXTERNALLY TO THE SCALP DAILY FOR PSORIASIS 11/8/21  Yes Nina Galloway MD   tretinoin (RETIN-A) 0.05 % cream Apply a pea sized amount to the face QHS 11/8/21  Yes Madeleine Jacome MD   ENBREL SURECLICK 50 MG/ML SOAJ INJECT 1 PEN UNDER THE SKIN EVERY 7 DAYS. 10/6/21  Yes Mine Bazan MD   lisinopril (PRINIVIL;ZESTRIL) 20 MG tablet TAKE 1 TABLET DAILY 3/18/21  Yes Madhuri Larios MD   linaCLOtide (LINZESS) 72 MCG CAPS capsule Take 1 capsule by mouth every morning (before breakfast) 3/4/21  Yes Madhuri Larios MD   meloxicam (MOBIC) 15 MG tablet TAKE 1 TABLET DAILY 11/16/20  Yes Mine Bazan MD   hydroCHLOROthiazide (HYDRODIURIL) 12.5 MG tablet Take 1 tablet by mouth daily 10/9/20  Yes Madhuri Larios MD   zinc sulfate (ZINCATE) 220 (50 Zn) MG capsule Take 50 mg by mouth daily   Yes Historical Provider, MD   Estradiol (YUVAFEM) 10 MCG TABS vaginal tablet INSERT 1 TABLET VAGINALLY TWICE WEEKLY 7/5/18  Yes Historical Provider, MD   fluticasone (FLONASE) 50 MCG/ACT nasal spray 1 spray by Nasal route daily   Yes Historical Provider, MD   Ascorbic Acid (VITAMIN C) 500 MG tablet Take 1,000 mg by mouth daily. Yes Historical Provider, MD   Cholecalciferol (VITAMIN D) 1000 UNIT CAPS Take 1 capsule by mouth daily. 2/28/11  Yes Historical Provider, MD   Flaxseed, Linseed, (FLAXSEED OIL) 1000 MG CAPS Take  by mouth. Yes Historical Provider, MD   Euliclety Keen Tea 250 MG CAPS Take  by mouth daily. 6/28/10  Yes Historical Provider, MD   PROBIOTIC CAPS Take  by mouth daily. 6/28/10  Yes Historical Provider, MD   clonazePAM (KLONOPIN) 0.5 MG tablet Take 1 tablet by mouth daily as needed for Anxiety for up to 30 days.  12/16/21 1/15/22  Mariel Drummond MD       Lab Studies:  Lab Results   Component Value Date    WBC 5.0 09/15/2021    HGB 14.1 09/15/2021    HCT 43.8 09/15/2021    MCV 82.2 09/15/2021     09/15/2021     Lab Results   Component Value Date    GLUCOSE 99 09/15/2021    BUN 20 09/15/2021    CREATININE 0.7 09/15/2021    K 4.4 09/15/2021     09/15/2021     09/15/2021    CALCIUM 9.9 09/15/2021     No results found for: MG  Lab Results   Component Value Date    PHOS 4.3 06/17/2015     Lab Results   Component Value Date    ALKPHOS 93 09/15/2021    ALT 21 09/15/2021    AST 21 09/15/2021    BILITOT 0.5 09/15/2021    PROT 6.9 09/15/2021     Review of Systems   Constitutional: Negative for activity change, appetite change, chills, fatigue and fever. HENT: Positive for postnasal drip. Negative for congestion, dental problem, drooling, ear discharge, ear pain, facial swelling, hearing loss, mouth sores, nosebleeds, rhinorrhea, sinus pressure, sinus pain, sneezing, sore throat, tinnitus, trouble swallowing and voice change. Eyes: Negative for pain, discharge and visual disturbance. Respiratory: Negative for apnea, cough, choking, chest tightness, shortness of breath, wheezing and stridor. Cardiovascular: Negative for palpitations. Gastrointestinal: Negative for blood in stool, constipation, diarrhea, nausea and vomiting. Endocrine: Negative for cold intolerance, heat intolerance, polydipsia, polyphagia and polyuria. Musculoskeletal: Negative for back pain, gait problem, neck pain and neck stiffness. Skin: Negative for color change, pallor, rash and wound. Allergic/Immunologic: Negative for environmental allergies, food allergies and immunocompromised state. Neurological: Negative for dizziness, facial asymmetry, speech difficulty, light-headedness, numbness and headaches. Hematological: Negative for adenopathy. Does not bruise/bleed easily. Psychiatric/Behavioral: Negative for agitation, confusion, self-injury and sleep disturbance. The patient is not nervous/anxious. Objective:   Physical Exam  Constitutional:       Appearance: She is well-developed. She is not ill-appearing. HENT:      Head: Normocephalic and atraumatic. Not macrocephalic and not microcephalic. No raccoon eyes, Escalera's sign, abrasion, contusion, right periorbital erythema, left periorbital erythema or laceration.  Hair is normal.      Jaw: No trismus. Salivary Glands: Right salivary gland is not diffusely enlarged. Left salivary gland is not diffusely enlarged. Right Ear: Hearing, tympanic membrane and external ear normal. No decreased hearing noted. No drainage, swelling or tenderness. No middle ear effusion. No mastoid tenderness. Tympanic membrane is not perforated, retracted or bulging. Tympanic membrane has normal mobility. Left Ear: Hearing, tympanic membrane and external ear normal. No decreased hearing noted. No drainage, swelling or tenderness. No middle ear effusion. No mastoid tenderness. Tympanic membrane is not perforated, retracted or bulging. Tympanic membrane has normal mobility. Ears:      Valdovinos exam findings: does not lateralize. Right Rinne: AC > BC. Left Rinne: AC > BC. Nose: No nasal deformity, septal deviation, laceration, mucosal edema or rhinorrhea. Right Nostril: No epistaxis. Left Nostril: No epistaxis. Right Turbinates: Enlarged. Left Turbinates: Enlarged. Right Sinus: No maxillary sinus tenderness or frontal sinus tenderness. Left Sinus: No maxillary sinus tenderness or frontal sinus tenderness. Mouth/Throat:      Lips: No lesions. Mouth: Mucous membranes are not pale, not dry and not cyanotic. No lacerations or oral lesions. Dentition: Normal dentition. No dental caries or dental abscesses. Tongue: No lesions. Palate: No mass. Pharynx: Uvula midline. No oropharyngeal exudate, posterior oropharyngeal erythema or uvula swelling. Tonsils: No tonsillar abscesses. Eyes:      General: Lids are normal. Lids are everted, no foreign bodies appreciated. Right eye: No discharge. Left eye: No discharge. Extraocular Movements:      Right eye: Normal extraocular motion and no nystagmus. Left eye: Normal extraocular motion and no nystagmus.       Conjunctiva/sclera:      Right eye: No chemosis or exudate. Left eye: No chemosis or exudate. Neck:      Thyroid: No thyroid mass or thyromegaly. Vascular: Normal carotid pulses. Trachea: Trachea normal. No tracheal deviation. Cardiovascular:      Rate and Rhythm: Normal rate and regular rhythm. Pulmonary:      Effort: No tachypnea, bradypnea or respiratory distress. Breath sounds: No stridor. Musculoskeletal:      Right shoulder: Normal range of motion. Left shoulder: Normal range of motion. Cervical back: Neck supple. Lymphadenopathy:      Head:      Right side of head: No submental, submandibular, tonsillar, preauricular, posterior auricular or occipital adenopathy. Left side of head: No submental, submandibular, tonsillar, preauricular, posterior auricular or occipital adenopathy. Cervical:      Right cervical: No superficial, deep or posterior cervical adenopathy. Left cervical: No superficial, deep or posterior cervical adenopathy. Skin:     Findings: No bruising, erythema, laceration or lesion. Neurological:      Mental Status: She is alert and oriented to person, place, and time. Psychiatric:         Speech: Speech normal.         Behavior: Behavior normal.           Due to the patients chronic sinus disease and/or history of sinonasal neoplasm for surveillance a nasal endoscopy with or without debridement will be performed to complete a significant physical examination of the patient which cannot be performed by anterior rhinoscopy alone (failure of complete examination of the paranasal sinuses). Failure to provide this procedure may lead to late detection of significant chronic benign disease, acute exacerbation, resolution or failure of early diagnosis of recurrent cancer. The procedure report is present in the body of the chart.        Nasal Endoscopy    Pre OP: sinus infection  Post OP: rhinitis  Reason: Worsening symtoms  Procedure: Nasal endoscopy  Surgeon: Yoselin Guerra  Anesthesia: Afrin with 2% lidocaine  Estimated Blood Loss: None      After obtaining verbal consent from the patient 1% lidocaine with afrin was sprayed into the nasal cavities. After allowing a time for anesthesia, a nasal endoscope was placed into the nostril. The septum, inferior, and middle turbinates were examined. The middle meatus, and sphenoethmoid recess was examined bilaterally. Cultures were not obtained from the sinuses. There were no complications. Pertinent positives included: There was not edema and purulence in the left middle meatus. There was not edema and purulence at the right middle meatus. Polyps were not identified in the sinuses. Masses were not identified. Tolerated well without complication. I attest that I was present for and did the entire procedure myself. Assessment:       Diagnosis Orders   1. Allergic rhinitis, unspecified seasonality, unspecified trigger     2. Recurrent maxillary sinusitis  External Referral To Allergy   3. Chronic rhinitis  External Referral To Allergy   4. Meningioma [facility])  Jasson Longoria MD, Neurosurgery, [facility]             Plan:      Stop azelastine. Stop singulair. Continue flonase. Start hypertonic rinses. Referral to allergy   Referral to NS. Call if sinus symtoms worsen.      Herman Brown MD

## 2022-04-07 ENCOUNTER — TELEPHONE (OUTPATIENT)
Dept: INTERNAL MEDICINE CLINIC | Age: 62
End: 2022-04-07

## 2022-04-07 NOTE — TELEPHONE ENCOUNTER
Pharmacy called in stating that the patient has a Sulfa allergy and there is a chance that she could be allergic to chlorthalidone (HYGROTON) 25 MG tablet. Pls call and advise.

## 2022-04-15 ENCOUNTER — OFFICE VISIT (OUTPATIENT)
Dept: RHEUMATOLOGY | Age: 62
End: 2022-04-15
Payer: COMMERCIAL

## 2022-04-15 VITALS
HEIGHT: 65 IN | DIASTOLIC BLOOD PRESSURE: 74 MMHG | BODY MASS INDEX: 26.33 KG/M2 | WEIGHT: 158 LBS | SYSTOLIC BLOOD PRESSURE: 118 MMHG

## 2022-04-15 DIAGNOSIS — M15.9 PRIMARY OSTEOARTHRITIS INVOLVING MULTIPLE JOINTS: ICD-10-CM

## 2022-04-15 DIAGNOSIS — J31.0 CHRONIC RHINITIS: ICD-10-CM

## 2022-04-15 DIAGNOSIS — L40.50 PSORIATIC ARTHRITIS (HCC): Primary | ICD-10-CM

## 2022-04-15 DIAGNOSIS — Z79.899 HIGH RISK MEDICATION USE: ICD-10-CM

## 2022-04-15 DIAGNOSIS — L40.9 PSORIASIS: ICD-10-CM

## 2022-04-15 PROCEDURE — 99214 OFFICE O/P EST MOD 30 MIN: CPT | Performed by: INTERNAL MEDICINE

## 2022-04-15 NOTE — PROGRESS NOTES
[address] Yang MD                                                                                                [phone] (P) [phone] (F)      Primary provider: Vera Briscoe MD  Patient identification: Muna Gavin: 1960,Sex: female         ASSESSMENT/PLAN:    Drew Aguilar was seen today for follow-up. 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  Onset at the age of 24- started with Dactylitis, then progressed to wide spread inflammatory arthritis. Started with Medrol, Sulfasalazine, Enbrel-early 2000's. A/P Today's visit-    Psoriatic arthritis- Asymptomatic  Enbrel 50 mg weekly. Safety labs are normal.  Prescription refilled. Skin psoriasis-in her elbows-doing very well with over-the-counter cream- \" mother of all oil\"-topical cream that did not contain steroid. Chronic sinus congestion-followed by allergy and ENT-not sure if she has vasomotor rhinitis. Recommend following up with current providers. 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 RTC 4 months. Patient indicates understanding and agrees with the management plan. I reviewed patients medical information and medical history in the medical records. Note is transcribed using voice recognition software. Inadvertent computerized transcription errors may be present. ##################################################################    Subjective: Follow-up for psoriasis, psoriatic arthritis and osteoarthritis. Interval changes-she had active psoriasis in both elbows, was frustrated with persistent scaly rash.   Her mother recommended over-the-counter cream \" mother of all of oil\"-that has really worked well for her. No scaly rash at this time. I looked at the ingredient this cream has multiple natural oils and moisturizers. Psoriatic arthritis asymptomatic. Osteoarthritis doing well on meloxicam.  What bothers her most is nasal congestion and nasal drainage That has been going on for years. Followed by ENT and allergist.  She is very frustrated that nothing has been helping with her rhinitis. All other review of systems are negative. Past medical, surgical history, social history allergies Meds reviewed. Current Outpatient Medications   Medication Sig Dispense Refill    chlorthalidone (HYGROTON) 25 MG tablet Take 0.5 tablets by mouth daily 45 tablet 1    chlorthalidone (HYGROTON) 25 MG tablet Take 0.5 tablets by mouth daily 30 tablet 0    ENBREL SURECLICK 50 MG/ML SOAJ INJECT 1 PEN UNDER THE SKIN EVERY 7 DAYS. 4 each 2    Calcipotriene (SORILUX) 0.005 % FOAM APPLY EXTERNALLY TO THE SCALP DAILY FOR PSORIASIS 120 g 5    atorvastatin (LIPITOR) 20 MG tablet TAKE 1 TABLET DAILY 90 tablet 3    montelukast (SINGULAIR) 10 MG tablet Take 1 tablet by mouth nightly 90 tablet 1    clonazePAM (KLONOPIN) 0.5 MG tablet Take 1 tablet by mouth daily as needed for Anxiety for up to 90 days. 90 tablet 0    azelastine (ASTELIN) 0.1 % nasal spray 2 sprays by Nasal route 2 times daily Use in each nostril as directed 1 each 11    fluocinolone (DERMA-SMOOTHE/FS SCALP) 0.01 % external oil Apply topically at bedtime with cap as directed. Wash in the morning. 120 mL 5    clobetasol (TEMOVATE) 0.05 % external solution Apply topically 2 times daily. 50 mL 5    clobetasol (TEMOVATE) 0.05 % ointment Apply to affected area BID PRN flares 60 g 1    CLOBEX SPRAY 0.05 % LIQD Apply to scalp daily - bid prn flares.  250 mL 3    tretinoin (RETIN-A) 0.05 % cream Apply a pea sized amount to the face QHS 45 g 3    lisinopril (PRINIVIL;ZESTRIL) 20 MG tablet TAKE 1 TABLET DAILY 90 tablet 3    linaCLOtide (LINZESS) 72 MCG CAPS capsule Take 1 capsule by mouth every morning (before breakfast) 90 capsule 3    meloxicam (MOBIC) 15 MG tablet TAKE 1 TABLET DAILY 90 tablet 3    zinc sulfate (ZINCATE) 220 (50 Zn) MG capsule Take 50 mg by mouth daily      Estradiol (YUVAFEM) 10 MCG TABS vaginal tablet INSERT 1 TABLET VAGINALLY TWICE WEEKLY      fluticasone (FLONASE) 50 MCG/ACT nasal spray 1 spray by Nasal route daily      Ascorbic Acid (VITAMIN C) 500 MG tablet Take 1,000 mg by mouth daily.  Cholecalciferol (VITAMIN D) 1000 UNIT CAPS Take 1 capsule by mouth daily.  Flaxseed, Linseed, (FLAXSEED OIL) 1000 MG CAPS Take  by mouth.  Green Tea 250 MG CAPS Take  by mouth daily.  PROBIOTIC CAPS Take  by mouth daily. No current facility-administered medications for this visit. Allergies   Allergen Reactions    Sulfa Antibiotics Rash    Codeine          OBJECTIVE  Physical    Vitals:    04/15/22 0804   BP: 118/74       General appearance/psychiatric: Well nourished and well groomed, normal judgment. Alert, appears stated age and cooperative. MKS:     28 joint JOINT COUNT:28 joint count 0                               Right                                                  Left   Swell Tender Swell Tender   PIP1           PIP2           PIP3           PIP4          PIP5          MCP1           MCP2           MCP3           MCP4           MCP5           Wrist           Elbow           Shoulder          Knee           Other than asymptomatic OA changes in scattered finger joints, CMC's, knees-no appreciable tender, swollen, inflamed joints in upper or lower extremities. F ROM in all peripheral joints. Normal gait. No dactylitis or enthesitis. Skin: Skin psoriasis-scant remanence of psoriasis in both elbows.     Data:  Lab Results   Component Value Date    WBC 4.8 01/17/2022    RBC 5.15 01/17/2022    HGB 13.6 01/17/2022    HCT 42.3 01/17/2022     01/17/2022    MCV 82.3 01/17/2022    MCH 26.5 01/17/2022    MCHC 32.2 01/17/2022    RDW 13.6 01/17/2022    SEGSPCT 55.3 03/15/2013    LYMPHOPCT 30.5 01/17/2022    MONOPCT 10.3 01/17/2022    BASOPCT 1.2 01/17/2022    MONOSABS 0.5 01/17/2022    LYMPHSABS 1.5 01/17/2022    EOSABS 0.2 01/17/2022    BASOSABS 0.1 01/17/2022    DIFFTYPE Auto-K 03/15/2013       Chemistry        Component Value Date/Time     01/17/2022 0937    K 4.4 01/17/2022 0937     01/17/2022 0937    CO2 25 01/17/2022 0937    BUN 22 (H) 01/17/2022 0937    CREATININE 0.8 01/17/2022 0937        Component Value Date/Time    CALCIUM 9.6 01/17/2022 0937    ALKPHOS 100 01/17/2022 0937    AST 20 01/17/2022 0937    ALT 19 01/17/2022 0937    BILITOT <0.2 01/17/2022 3754             I thank you for giving me the opportunity to be involved in [address] care and I look forward following Arielle Alejo along with you. If you have any questions or concerns please feel free to contact me at any time.     Dawood Cedeño MD 04/15/22

## 2022-04-18 ENCOUNTER — PATIENT MESSAGE (OUTPATIENT)
Dept: INTERNAL MEDICINE CLINIC | Age: 62
End: 2022-04-18

## 2022-04-18 DIAGNOSIS — I10 ESSENTIAL HYPERTENSION: ICD-10-CM

## 2022-04-18 RX ORDER — LISINOPRIL 20 MG/1
TABLET ORAL
Qty: 90 TABLET | Refills: 3 | Status: SHIPPED | OUTPATIENT
Start: 2022-04-18

## 2022-04-18 RX ORDER — MELOXICAM 15 MG/1
TABLET ORAL
Qty: 90 TABLET | Refills: 3 | Status: SHIPPED | OUTPATIENT
Start: 2022-04-18

## 2022-04-18 NOTE — TELEPHONE ENCOUNTER
From: Cordell Pepe  To: Dr. Roman Bodily: 4/18/2022 7:56 AM EDT  Subject: refills needed    Can you please call into [facility] refills for Meloxicam and Lisinopril? I am nearly out. 90 days with refills.     Thank you,  Linda Kirkland

## 2022-04-21 ENCOUNTER — PATIENT MESSAGE (OUTPATIENT)
Dept: DERMATOLOGY | Age: 62
End: 2022-04-21

## 2022-04-21 NOTE — TELEPHONE ENCOUNTER
From: Norbert Henry  To: Dr. William Rodriguez: 4/21/2022 9:05 AM EDT  Subject: Refill    [facility] you [facility]     Thanks  Nereida Rodriguez

## 2022-04-25 RX ORDER — TRETINOIN 0.5 MG/G
CREAM TOPICAL
Qty: 45 G | Refills: 5 | Status: SHIPPED | OUTPATIENT
Start: 2022-04-25

## 2022-05-02 ENCOUNTER — PATIENT MESSAGE (OUTPATIENT)
Dept: INTERNAL MEDICINE CLINIC | Age: 62
End: 2022-05-02

## 2022-05-02 DIAGNOSIS — F41.9 ANXIETY: ICD-10-CM

## 2022-05-02 DIAGNOSIS — G25.81 RESTLESS LEGS SYNDROME (RLS): ICD-10-CM

## 2022-05-02 RX ORDER — CLONAZEPAM 0.5 MG/1
0.5 TABLET ORAL DAILY PRN
Qty: 90 TABLET | Refills: 0 | Status: SHIPPED | OUTPATIENT
Start: 2022-05-02 | End: 2022-08-11 | Stop reason: SDUPTHER

## 2022-05-02 NOTE — TELEPHONE ENCOUNTER
From: Vitaliy New  To: Dr. Bassam Manzo: 5/2/2022 8:25 AM EDT  Subject: Refill for Clonazepam needed    Can you call in a 90 day refil to [facility]? I have my virtual visit with you on Thursday of this week.     Thanks,  Sanjuanita Preera

## 2022-05-03 ENCOUNTER — OFFICE VISIT (OUTPATIENT)
Dept: DERMATOLOGY | Age: 62
End: 2022-05-03
Payer: COMMERCIAL

## 2022-05-03 VITALS — TEMPERATURE: 97.2 F

## 2022-05-03 DIAGNOSIS — Z86.018 HISTORY OF DYSPLASTIC NEVUS: ICD-10-CM

## 2022-05-03 DIAGNOSIS — D22.9 MULTIPLE NEVI: Primary | ICD-10-CM

## 2022-05-03 DIAGNOSIS — L82.1 SEBORRHEIC KERATOSIS: ICD-10-CM

## 2022-05-03 DIAGNOSIS — L40.9 PSORIASIS: ICD-10-CM

## 2022-05-03 DIAGNOSIS — Z87.2 HISTORY OF ACTINIC KERATOSES: ICD-10-CM

## 2022-05-03 PROCEDURE — 99214 OFFICE O/P EST MOD 30 MIN: CPT | Performed by: DERMATOLOGY

## 2022-05-03 RX ORDER — CALCIPOTRIENE AND BETAMETHASONE DIPROPIONATE 50; .5 UG/G; MG/G
AEROSOL, FOAM TOPICAL
Status: CANCELLED | OUTPATIENT
Start: 2022-05-03

## 2022-05-03 NOTE — PROGRESS NOTES
[facility] Dermatology  Ame Pace MD  [phone]      Vitaliy New  1960    63 y.o. female     Date of Visit: 5/3/2022    Last seen:     Chief Complaint: moles/lesions, f/u psoriasis  Chief Complaint   Patient presents with    Skin Exam     6 mo FSE      HX: AK     History of Present Illness:  *went to a [address] in [address] [place] [address] earlier this year    1. Mult nevi - here for full skin check; no new concerns or changing lesions. 2. F/u for AKs - no new concerns or probs with previous sites. 3. Hx of psoriasis, mainly scalp (on Enbrel for PsA arthritis) - sees Dr. Alysia Orellana now; fairly well-controlled with clobex spray/soluntion as needed flares on the scalp + sorilux and occasional dermasmoothe and clobetasol oint prn flares elsewhere. She has been flaring on the occipital scalp and a few lesions on the distal LE's currently. Added Sorilux foam more recently for recent scalp flares and this has helped some. Worse since last seen - arms, legs, scalp flaring. Better after colors hair x 2 days and better with trips to warmer, more humid weather. 4.  S/p bx of esion on the left upper back at last visit -r ead as mod dysplastic nevus. Had recurrence of pigment here last visit in October 2020 so repeat biopsy was done and read as junctional recurrence of a previous biopsy nevus, no atypia seen. [facility] - bx 2021    S/p excision of 2 cysts on the back in 2018 - one had ruptured back in the summer -  Ended up in ED and had I/D with packing and abx - clinda then doxy d/t culture grew staph lugdunensis prior to excision. Hx of ovarian cancer; her sister was diagnosed with breast CA. In the past few years - her sister was BRCA neg. She has had her veins treated. Review of Systems:  Gen: Feels well, good sense of health. Skin: No changing moles or lesions. Past Medical History, Family History, Surgical History, Medications and Allergies reviewed.     Outpatient Medications Marked as Taking for the 5/3/22 encounter (Office Visit) with Gene Chapa MD   Medication Sig Dispense Refill    clonazePAM (KLONOPIN) 0.5 MG tablet Take 1 tablet by mouth daily as needed for Anxiety for up to 90 days. 90 tablet 0    tretinoin (RETIN-A) 0.05 % cream Apply a pea sized amount to the face QHS 45 g 5    lisinopril (PRINIVIL;ZESTRIL) 20 MG tablet TAKE 1 TABLET DAILY 90 tablet 3    meloxicam (MOBIC) 15 MG tablet TAKE 1 TABLET DAILY 90 tablet 3    chlorthalidone (HYGROTON) 25 MG tablet Take 0.5 tablets by mouth daily 45 tablet 1    chlorthalidone (HYGROTON) 25 MG tablet Take 0.5 tablets by mouth daily 30 tablet 0    ENBREL SURECLICK 50 MG/ML SOAJ INJECT 1 PEN UNDER THE SKIN EVERY 7 DAYS. 4 each 2    Calcipotriene (SORILUX) 0.005 % FOAM APPLY EXTERNALLY TO THE SCALP DAILY FOR PSORIASIS 120 g 5    atorvastatin (LIPITOR) 20 MG tablet TAKE 1 TABLET DAILY 90 tablet 3    azelastine (ASTELIN) 0.1 % nasal spray 2 sprays by Nasal route 2 times daily Use in each nostril as directed 1 each 11    fluocinolone (DERMA-SMOOTHE/FS SCALP) 0.01 % external oil Apply topically at bedtime with cap as directed. Wash in the morning. 120 mL 5    clobetasol (TEMOVATE) 0.05 % external solution Apply topically 2 times daily. 50 mL 5    clobetasol (TEMOVATE) 0.05 % ointment Apply to affected area BID PRN flares 60 g 1    CLOBEX SPRAY 0.05 % LIQD Apply to scalp daily - bid prn flares. 250 mL 3    linaCLOtide (LINZESS) 72 MCG CAPS capsule Take 1 capsule by mouth every morning (before breakfast) 90 capsule 3    zinc sulfate (ZINCATE) 220 (50 Zn) MG capsule Take 50 mg by mouth daily      Estradiol (YUVAFEM) 10 MCG TABS vaginal tablet INSERT 1 TABLET VAGINALLY TWICE WEEKLY      fluticasone (FLONASE) 50 MCG/ACT nasal spray 1 spray by Nasal route daily      Ascorbic Acid (VITAMIN C) 500 MG tablet Take 1,000 mg by mouth daily.       Cholecalciferol (VITAMIN D) 1000 UNIT CAPS Take 1 capsule by mouth daily.      Flaxseed, Linseed, (FLAXSEED OIL) 1000 MG CAPS Take  by mouth.  Green Tea 250 MG CAPS Take  by mouth daily.  PROBIOTIC CAPS Take  by mouth daily. Allergies   Allergen Reactions    Sulfa Antibiotics Rash    Codeine        Past Medical History:   Diagnosis Date    Actinic keratosis 2010    Allergic rhinitis     Anxiety 2010    Cancer ([facility]     ovarian    Chronic idiopathic constipation 2017    Hemifacial spasm     Botox    Hyperlipidemia     Osteoarthritis     Psoriasis     Psoriatic arthritis ([facility]      Past Surgical History:   Procedure Laterality Date    BRAIN SURGERY      for blood vessel abnormality    BREAST REDUCTION SURGERY       SECTION      COLONOSCOPY      FINGER SURGERY  2007    Left index finger     HYSTERECTOMY      INNER EAR SURGERY  2008    repair artery right ear    NEUROMA SURGERY      microvascular reconstruction to fix hemifacial spasm    RHINOPLASTY      SHOULDER SURGERY      left shoulder    AMRITA AND BSO      TOE SURGERY      TONSILLECTOMY      VARICOSE VEIN SURGERY      WRIST SURGERY      right wrist       Physical Examination     Gen, well-appearing  FSE today    trunk and extremities with scattered brown macules and papules   No AK's  Occipital scalp with erythematous mildly scaly patches  Extensor FA's and shins with scattered erythwmtous dry macules  Left upper back, near linear scar from cyst removal -scar clear    Assessment and Plan     1. Benign-appearing nevi and few SKs  2. AK's - no new concerns  - educ re si/sx/ABCD's of MM   educ sun protection - OTC sunscreen with SPF 30-50+ recommended and reviewed usage  encouraged skin check yearly (sooner if indicated), self checks    3.  Psoriasis - flaring on the scalp and focal macules on the legs + arms, ~2-3% BSA  - clobex spray/soln for the scalp prn flares; ed se/misuse  - clobetasol oint daily - bid prn flares; ed se/misuse  - cont soriulux for the scalp  - can add dermasmoothe oil prn more severe scalp flares  - sampled wynzora and duobrii today to try one on each leg - see if better clearance than above and will call with preference  - can also try enstilar but more expensive  - cont Enbrel per rheum - no skin side effects -discussed alternative treatments may give better skin clearance but may not have as good of control with arthritis/joint symptoms, so would not recommend a change at this point for degree of skin activity. If skin is worsening though, can consider change. 4. Moderately dysplastic nevus - L upper back near scar from cyst - removed  - Recurrent and removed  - remains clear  -Continue sun protection as above    Follow-up 6 to 12 months. Filled tretinoin.

## 2022-05-05 ENCOUNTER — TELEMEDICINE (OUTPATIENT)
Dept: INTERNAL MEDICINE CLINIC | Age: 62
End: 2022-05-05
Payer: COMMERCIAL

## 2022-05-05 DIAGNOSIS — J30.0 VASOMOTOR RHINITIS: ICD-10-CM

## 2022-05-05 DIAGNOSIS — F41.9 ANXIETY: Primary | ICD-10-CM

## 2022-05-05 DIAGNOSIS — I10 ESSENTIAL HYPERTENSION: ICD-10-CM

## 2022-05-05 PROCEDURE — 99214 OFFICE O/P EST MOD 30 MIN: CPT | Performed by: INTERNAL MEDICINE

## 2022-05-05 RX ORDER — HYDROCHLOROTHIAZIDE 12.5 MG/1
12.5 CAPSULE, GELATIN COATED ORAL DAILY
COMMUNITY
End: 2022-11-04 | Stop reason: SDUPTHER

## 2022-05-05 ASSESSMENT — PATIENT HEALTH QUESTIONNAIRE - PHQ9
SUM OF ALL RESPONSES TO PHQ QUESTIONS 1-9: 0
SUM OF ALL RESPONSES TO PHQ9 QUESTIONS 1 & 2: 0
SUM OF ALL RESPONSES TO PHQ QUESTIONS 1-9: 0
SUM OF ALL RESPONSES TO PHQ QUESTIONS 1-9: 0
2. FEELING DOWN, DEPRESSED OR HOPELESS: 0
1. LITTLE INTEREST OR PLEASURE IN DOING THINGS: 0
SUM OF ALL RESPONSES TO PHQ QUESTIONS 1-9: 0

## 2022-05-05 NOTE — PROGRESS NOTES
Ruthann Bobo (:  1960) is a Established patient, here for evaluation of the following:    Assessment & Plan   Below is the assessment and plan developed based on review of pertinent history, physical exam, labs, studies, and medications. 1. Anxiety   -Stable, continue clonazepam 0.5 mg daily as needed  2. Essential hypertension   -Controlled, continue lisinopril 20 mg daily, HCTZ 12.5 mg daily  3. Vasomotor rhinitis   -Saw allergy, continue Flonase, sinus rinses, uses Afrin for short periods of time as needed    Return in about 3 months (around 2022) for anxiety, htn. Subjective   HPI     Anxiety is stable, she is clonazepam.  No problems or side effects, it seems to working well. Hypertension - on the HCTZ - her batch wasn't recalled so just will stick with that. No problems with that medication, seems to be working well. Dx with vasomotor rhinitis. Off montelukast. Using afrin as needed, sinus rinses and flonase daily. So far seems to be going okay. She is going on a trip to [facility] in [place], the guide suggested bringing printed prescriptions but she plans to bring an adequate supply and is not sure she might need that. Psoriasis has been acting up a bit, her dermatologist put her on some new creams which hopefully will take care of it. She was to make sure she keeps an eye on her sugar, has never been an issue for her but her brother has uncontrolled diabetes and is looking at some possible amputations. She is having a follow-up MRI for possible meningioma that was seen on imaging of the sinuses, she thinks that this is something that was seen quite a while ago has some old imaging at  that she requested. Review of Systems       Objective   Patient-Reported Vitals  Patient-Reported Systolic (Top): 379 mmHg (Patient entered data at this time. Check the audit trail.)  Patient-Reported Diastolic (Bottom): 78 mmHg (Patient entered data at this time.  Check the audit trail.)  Patient-Reported Weight: 155 lbs (Patient entered data at this time. Check the audit trail.)  Patient-Reported Height: 5 5 (Patient entered data at this time. Check the audit trail.)       Physical Exam  Vitals reviewed. Constitutional:       General: She is not in acute distress. Appearance: Normal appearance. She is well-developed. HENT:      Head: Normocephalic and atraumatic. Pulmonary:      Effort: Pulmonary effort is normal. No respiratory distress. Skin:     General: Skin is warm and dry. Neurological:      Mental Status: She is alert. Psychiatric:         Behavior: Behavior normal.         Thought Content: Thought content normal.         Judgment: Judgment normal.                  Danny Kaminski, was evaluated through a synchronous (real-time) audio-video encounter. The patient (or guardian if applicable) is aware that this is a billable service, which includes applicable co-pays. This Virtual Visit was conducted with patient's (and/or legal guardian's) consent. The visit was conducted pursuant to the emergency declaration under the [address] [place] [address] authority and the [employer] General Act. Patient identification was verified, and a caregiver was present when appropriate. The patient was located at home in a state where the provider was licensed to provide care.        --Eric Triana MD

## 2022-05-07 ENCOUNTER — PATIENT MESSAGE (OUTPATIENT)
Dept: DERMATOLOGY | Age: 62
End: 2022-05-07

## 2022-05-09 NOTE — TELEPHONE ENCOUNTER
From: Felipa Goode  To: Dr. Radha Madera: 5/7/2022 9:58 AM EDT  Subject: Topical Prescription    Dr. Sonali Vargas:    It seems as if the Oralia Chairez is working the best. Can you please [facility] in a [facility] ?     Thanks,  Dave Garrison

## 2022-05-10 RX ORDER — CALCIPOTRIENE AND BETAMETHASONE DIPROPIONATE 50; 64 UG/G; MG/G
CREAM TOPICAL
Qty: 60 G | Refills: 1 | Status: SHIPPED | OUTPATIENT
Start: 2022-05-10

## 2022-05-10 RX ORDER — CALCIPOTRIENE AND BETAMETHASONE DIPROPIONATE 50; 64 UG/G; MG/G
CREAM TOPICAL
Qty: 60 G | Refills: 1 | Status: SHIPPED | OUTPATIENT
Start: 2022-05-10 | End: 2022-05-10 | Stop reason: SDUPTHER

## 2022-05-19 ENCOUNTER — HOSPITAL ENCOUNTER (OUTPATIENT)
Dept: MRI IMAGING | Age: 62
Discharge: HOME | End: 2022-05-19
Payer: COMMERCIAL

## 2022-05-19 DIAGNOSIS — D32.9 MENINGIOMA (HCC): ICD-10-CM

## 2022-05-19 PROCEDURE — 6360000004 HC RX CONTRAST MEDICATION: Performed by: OTOLARYNGOLOGY

## 2022-05-19 PROCEDURE — 70553 MRI BRAIN STEM W/O & W/DYE: CPT

## 2022-05-19 PROCEDURE — A9579 GAD-BASE MR CONTRAST NOS,1ML: HCPCS | Performed by: OTOLARYNGOLOGY

## 2022-05-19 RX ADMIN — GADOTERIDOL 15 ML: 279.3 INJECTION, SOLUTION INTRAVENOUS at 09:03

## 2022-06-06 ENCOUNTER — TELEMEDICINE (OUTPATIENT)
Dept: INTERNAL MEDICINE CLINIC | Age: 62
End: 2022-06-06
Payer: COMMERCIAL

## 2022-06-06 DIAGNOSIS — U07.1 COVID-19: Primary | ICD-10-CM

## 2022-06-06 PROCEDURE — 99213 OFFICE O/P EST LOW 20 MIN: CPT | Performed by: INTERNAL MEDICINE

## 2022-06-06 ASSESSMENT — PATIENT HEALTH QUESTIONNAIRE - PHQ9
2. FEELING DOWN, DEPRESSED OR HOPELESS: 0
SUM OF ALL RESPONSES TO PHQ QUESTIONS 1-9: 0
SUM OF ALL RESPONSES TO PHQ9 QUESTIONS 1 & 2: 0
SUM OF ALL RESPONSES TO PHQ QUESTIONS 1-9: 0
1. LITTLE INTEREST OR PLEASURE IN DOING THINGS: 0
SUM OF ALL RESPONSES TO PHQ QUESTIONS 1-9: 0
SUM OF ALL RESPONSES TO PHQ QUESTIONS 1-9: 0

## 2022-06-20 DIAGNOSIS — L40.50 PSORIATIC ARTHRITIS (HCC): Primary | ICD-10-CM

## 2022-06-20 RX ORDER — ETANERCEPT 50 MG/ML
50 SOLUTION SUBCUTANEOUS
Qty: 4 EACH | Refills: 2 | Status: SHIPPED | OUTPATIENT
Start: 2022-06-20 | End: 2022-06-27 | Stop reason: SDUPTHER

## 2022-06-20 NOTE — TELEPHONE ENCOUNTER
Pharmacy called requesting refill on Enbrel medication.  Rx has been pended for approval     Last OV 4/15/22  Future OV 8/15/22  Recent Labs 2/25/22

## 2022-06-27 ENCOUNTER — PATIENT MESSAGE (OUTPATIENT)
Dept: RHEUMATOLOGY | Age: 62
End: 2022-06-27

## 2022-06-27 DIAGNOSIS — L40.50 PSORIATIC ARTHRITIS (HCC): ICD-10-CM

## 2022-06-27 RX ORDER — ETANERCEPT 50 MG/ML
50 SOLUTION SUBCUTANEOUS
Qty: 4 EACH | Refills: 2 | Status: SHIPPED | OUTPATIENT
Start: 2022-06-27

## 2022-06-27 NOTE — TELEPHONE ENCOUNTER
From: Juan C Tovar  To: Dr. Shane Viramontes  Sent: 6/27/2022 9:49 AM EDT  Subject: Refill Needed    Can you please call in a renewed prescription for my Embrel to [facility] long-term prescription? I just filled my last one.     Thanks,  Jose Suggs

## 2022-08-04 LAB — MAMMOGRAPHY, EXTERNAL: NORMAL

## 2022-08-11 ENCOUNTER — TELEMEDICINE (OUTPATIENT)
Dept: INTERNAL MEDICINE CLINIC | Age: 62
End: 2022-08-11
Payer: COMMERCIAL

## 2022-08-11 DIAGNOSIS — F41.9 ANXIETY: ICD-10-CM

## 2022-08-11 DIAGNOSIS — G25.81 RESTLESS LEGS SYNDROME (RLS): Primary | ICD-10-CM

## 2022-08-11 DIAGNOSIS — L40.50 PSORIATIC ARTHRITIS (HCC): ICD-10-CM

## 2022-08-11 PROCEDURE — 99214 OFFICE O/P EST MOD 30 MIN: CPT | Performed by: INTERNAL MEDICINE

## 2022-08-11 RX ORDER — CLONAZEPAM 0.5 MG/1
0.5 TABLET ORAL DAILY PRN
Qty: 90 TABLET | Refills: 0 | Status: SHIPPED | OUTPATIENT
Start: 2022-08-11 | End: 2022-10-20 | Stop reason: SDUPTHER

## 2022-08-11 NOTE — PROGRESS NOTES
Jamel Ybarra (:  1960) is a Established patient, here for evaluation of the following:    Assessment & Plan   Below is the assessment and plan developed based on review of pertinent history, physical exam, labs, studies, and medications. 1. Restless legs syndrome (RLS)  -Stable. OARRS reviewed, no concerns  -     clonazePAM (KLONOPIN) 0.5 MG tablet; Take 1 tablet by mouth daily as needed for Anxiety for up to 90 days. , Disp-90 tablet, R-0Normal  2. Anxiety  -Increase related to work stress. No change to medication  -     clonazePAM (KLONOPIN) 0.5 MG tablet; Take 1 tablet by mouth daily as needed for Anxiety for up to 90 days. , Disp-90 tablet, R-0Normal  3. Psoriatic arthritis ([address]   -Worsening psoriasis plaques although the arthritis symptoms are stable. She has an appointment with the rheumatologist next week, can discuss whether to change the DMARD to an alternative agent. She has not been responding to medications adequately. Return in about 3 months (around 2022) for anxiety, rls. Subjective   HPI    RLS is stable - controlled with clonazepam. Having some insomnia but related to stress/anxiety at work. She does think the clonazepam also helps with the anxiety at nighttime. Getting more psoriasis plaque, increased with covid and accelerated in the last 6 months. The Enbrel does not seem to be controlling the psoriasis well at all, though she does not have a lot of arthritis symptoms. She is seeing her dermatologist and they have changed her topical medication but it only helped for a couple of weeks. She thinks the Enbrel may need to be changed. Review of Systems       Objective   Patient-Reported Vitals  No data recorded     Physical Exam  Vitals reviewed. Constitutional:       General: She is not in acute distress. Appearance: Normal appearance. HENT:      Head: Normocephalic and atraumatic.    Pulmonary:      Effort: Pulmonary effort is normal. No respiratory distress. Neurological:      General: No focal deficit present. Mental Status: She is alert. Psychiatric:         Mood and Affect: Mood is anxious. Behavior: Behavior normal.         Thought Content: Thought content normal.         Judgment: Judgment normal.                Renny Anguiano, was evaluated through a synchronous (real-time) audio-video encounter. The patient (or guardian if applicable) is aware that this is a billable service, which includes applicable co-pays. This Virtual Visit was conducted with patient's (and/or legal guardian's) consent. The visit was conducted pursuant to the emergency declaration under the [facility], [address] [place] [address] [facility] authority and the [employer] and [employer] General Act. Patient identification was verified, and a caregiver was present when appropriate. The patient was located at Home: [place] [address]. Provider was located at [address]): [phone]. [address] [place] [address], [address] [place] [address],  [address]         --Bernard Amado MD

## 2022-08-15 ENCOUNTER — OFFICE VISIT (OUTPATIENT)
Dept: RHEUMATOLOGY | Age: 62
End: 2022-08-15
Payer: COMMERCIAL

## 2022-08-15 VITALS
BODY MASS INDEX: 26.66 KG/M2 | SYSTOLIC BLOOD PRESSURE: 110 MMHG | HEIGHT: 65 IN | WEIGHT: 160 LBS | DIASTOLIC BLOOD PRESSURE: 76 MMHG

## 2022-08-15 DIAGNOSIS — M15.9 PRIMARY OSTEOARTHRITIS INVOLVING MULTIPLE JOINTS: ICD-10-CM

## 2022-08-15 DIAGNOSIS — L40.50 PSORIATIC ARTHRITIS (HCC): Primary | ICD-10-CM

## 2022-08-15 DIAGNOSIS — Z79.899 HIGH RISK MEDICATION USE: ICD-10-CM

## 2022-08-15 DIAGNOSIS — L40.9 PSORIASIS: ICD-10-CM

## 2022-08-15 PROCEDURE — 99215 OFFICE O/P EST HI 40 MIN: CPT | Performed by: INTERNAL MEDICINE

## 2022-08-15 RX ORDER — SECUKINUMAB 150 MG/ML
INJECTION SUBCUTANEOUS
Qty: 1 PEN | Refills: 3 | Status: SHIPPED | OUTPATIENT
Start: 2022-08-15

## 2022-08-15 RX ORDER — SECUKINUMAB 150 MG/ML
INJECTION SUBCUTANEOUS
Qty: 5 PEN | Refills: 0 | Status: SHIPPED | OUTPATIENT
Start: 2022-08-15 | End: 2022-09-13

## 2022-08-15 NOTE — PROGRESS NOTES
[address] Yang MD                                                                                                [phone] (I) [phone] (F)      Primary provider: Vickie Rodriguez MD  Patient identification: Cliff Archer: 1960,Sex: female         ASSESSMENT/PLAN:    Rg Childers was seen today for follow-up. Diagnoses and all orders for this visit:    Psoriatic arthritis ([facility] [facility]    Psoriasis    High risk medication use  -     Comprehensive Metabolic Panel; Future  -     CBC with Auto Differential; Future  -     C-Reactive Protein; Future  -     Sedimentation Rate; Future  -     Quantiferon, Incubated; Future  -     Hepatitis B Surface Antigen; Future  -     Hepatitis C Antibody; Future    Other orders  -     secukinumab (COSENTYX SENSOREADY PEN) 150 MG/ML SOAJ; Inject 150 mg sc Week 0,1,2,3,4. Total 5 loading doses. 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  Onset at the age of 24- started with Dactylitis, then progressed to wide spread inflammatory arthritis. Started with Medrol, Sulfasalazine, Enbrel-early 2000's. A/P Today's visit-    Psoriatic arthritis- Asymptomatic, has been on Enbrel since early 2000's. Skin psoriasis- Active although seems under reasonable control with topical therapy- elbows, and lower ext. Patient would like to try a different systemic therapy given the need to use topical therapy regularly, incomplete clearance with topical therapy and is affecting her quality of life adversely. Seasonal allergies-followed by allergist.    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 Plan-  Various treatment options were discussed-Biologics as well as adding low-dose methotrexate.   She prefers a different Biologics as she enjoys dinner wine 2 to 3 glasses-2-3 times a week. Recommend Cosentyx given different mechanism of action and Enbrel. Risk, benefits and side effects fully explained in details to my best knowledge, including but not limited the immune suppression, increased risk of infections dudley atypical infections like fungus, tuberculosis, probable risk of cancers and injection site reactions, activation of occult inflammatory bowel disease. In case of suspected  infection    patient is strongly advised to call my office or other health providers, and advised to hold medication. Patient verbalizes understanding. I also provided written information and patient is encouraged to discuss with any concerns. Patient was made aware that these are high risk medications, and explained the importance of close follow up and testing in order to identify any adverse events.  -Tb test, hepatitis testing today.  -Immunizations-recommend updating Tdap, pneumococcal vaccine, Shingrix. Up-to-date with COVID-vaccine. Washout time 1 week from Enbrel to Cosentyx. Call us in 2 weeks to follow-up on prior authorization. RTC 3 months. Patient indicates understanding and agrees with the management plan. I reviewed patients medical information and medical history in the medical records. Note is transcribed using voice recognition software. Inadvertent computerized transcription errors may be present. ##################################################################    Subjective: Follow-up for psoriasis, psoriatic arthritis and osteoarthritis. Interval changes-  She continues to have active skin psoriasis lesions in her elbow area, front of the knee and legs. She has been using topical corticosteroid that helps as long as she uses but does not clear the lesions completely.   States that she has not been able to use shorts or short-sleeved clothes this summer, gets embarrassed with black psoriatic lesions. Joints are well controlled. Osteoarthritis as well as psoriatic arthritis. She is noticing mild DIP joint bony swelling especially right index finger and long fingers, not painful. Denies any inflammatory back pain. Overall pleased on Enbrel in terms of joint disease, not too happy with skin psoriasis. No side effects from medications. She does not have history of inflammatory bowel diseases. All other review of systems are negative. Past medical, surgical history, social history allergies Meds reviewed. Current Outpatient Medications   Medication Sig Dispense Refill    secukinumab (COSENTYX SENSOREADY PEN) 150 MG/ML SOAJ Inject 150 mg sc Week 0,1,2,3,4. Total 5 loading doses. 5 pen 0    secukinumab (COSENTYX SENSOREADY PEN) 150 MG/ML SOAJ Inject 1 pen Q 28 days/ 1 pen 3    clonazePAM (KLONOPIN) 0.5 MG tablet Take 1 tablet by mouth daily as needed for Anxiety for up to 90 days. 90 tablet 0    Etanercept (ENBREL SURECLICK) 50 MG/ML SOAJ Inject 50 mg into the skin every 7 days 4 each 2    WYNZORA 0.005-0.064 % CREA Apply daily for psoriasis for up to 8 weeks. 60 g 1    hydroCHLOROthiazide (MICROZIDE) 12.5 MG capsule Take 12.5 mg by mouth daily      tretinoin (RETIN-A) 0.05 % cream Apply a pea sized amount to the face QHS 45 g 5    lisinopril (PRINIVIL;ZESTRIL) 20 MG tablet TAKE 1 TABLET DAILY 90 tablet 3    meloxicam (MOBIC) 15 MG tablet TAKE 1 TABLET DAILY 90 tablet 3    Calcipotriene (SORILUX) 0.005 % FOAM APPLY EXTERNALLY TO THE SCALP DAILY FOR PSORIASIS 120 g 5    atorvastatin (LIPITOR) 20 MG tablet TAKE 1 TABLET DAILY 90 tablet 3    azelastine (ASTELIN) 0.1 % nasal spray 2 sprays by Nasal route 2 times daily Use in each nostril as directed 1 each 11    fluocinolone (DERMA-SMOOTHE/FS SCALP) 0.01 % external oil Apply topically at bedtime with cap as directed. Wash in the morning. 120 mL 5    clobetasol (TEMOVATE) 0.05 % external solution Apply topically 2 times daily.  50 mL 5 clobetasol (TEMOVATE) 0.05 % ointment Apply to affected area BID PRN flares 60 g 1    CLOBEX SPRAY 0.05 % LIQD Apply to scalp daily - bid prn flares. 250 mL 3    linaCLOtide (LINZESS) 72 MCG CAPS capsule Take 1 capsule by mouth every morning (before breakfast) 90 capsule 3    zinc sulfate (ZINCATE) 220 (50 Zn) MG capsule Take 50 mg by mouth daily      Estradiol (VAGIFEM) 10 MCG TABS vaginal tablet INSERT 1 TABLET VAGINALLY TWICE WEEKLY      fluticasone (FLONASE) 50 MCG/ACT nasal spray 1 spray by Nasal route daily      Ascorbic Acid (VITAMIN C) 500 MG tablet Take 1,000 mg by mouth daily. Cholecalciferol (VITAMIN D) 1000 UNIT CAPS Take 1 capsule by mouth daily. Flaxseed, Linseed, (FLAXSEED OIL) 1000 MG CAPS Take  by mouth. Green Tea 250 MG CAPS Take  by mouth daily. PROBIOTIC CAPS Take  by mouth daily. No current facility-administered medications for this visit. Allergies   Allergen Reactions    Sulfa Antibiotics Rash    Codeine          OBJECTIVE  Physical    Vitals:    08/15/22 0806   BP: 110/76       General appearance/psychiatric: Well nourished and well groomed, normal judgment. Alert, appears stated age and cooperative. MKS:     28 joint JOINT COUNT:28 joint count 0                               Right                                                  Left   Swell Tender Swell Tender   PIP1           PIP2           PIP3           PIP4          PIP5          MCP1           MCP2           MCP3           MCP4           MCP5           Wrist           Elbow           Shoulder          Knee             Small Heberden's nodules in her both index finger DIPs and long finger-right. Subtle OA changes across her CMC's, PIPs. No appreciable tender, swollen, inflamed joints in upper or lower extremities. F ROM in all peripheral joints. Normal gait. No dactylitis or enthesitis. Skin: Scalp psoriasis, plaque psoriasis-elbows, front of knees, and legs.     Data:  Lab Results   Component Value Date/Time    WBC 4.8 01/17/2022 09:37 AM    RBC 5.15 01/17/2022 09:37 AM    HGB 13.6 01/17/2022 09:37 AM    HCT 42.3 01/17/2022 09:37 AM     01/17/2022 09:37 AM    MCV 82.3 01/17/2022 09:37 AM    MCH 26.5 01/17/2022 09:37 AM    MCHC 32.2 01/17/2022 09:37 AM    RDW 13.6 01/17/2022 09:37 AM    SEGSPCT 55.3 03/15/2013 07:11 AM    LYMPHOPCT 30.5 01/17/2022 09:37 AM    MONOPCT 10.3 01/17/2022 09:37 AM    BASOPCT 1.2 01/17/2022 09:37 AM    MONOSABS 0.5 01/17/2022 09:37 AM    LYMPHSABS 1.5 01/17/2022 09:37 AM    EOSABS 0.2 01/17/2022 09:37 AM    BASOSABS 0.1 01/17/2022 09:37 AM    DIFFTYPE Auto-K 03/15/2013 07:11 AM       Chemistry        Component Value Date/Time     01/17/2022 0937    K 4.4 01/17/2022 0937     01/17/2022 0937    CO2 25 01/17/2022 0937    BUN 22 (H) 01/17/2022 0937    CREATININE 0.8 01/17/2022 0937        Component Value Date/Time    CALCIUM 9.6 01/17/2022 0937    ALKPHOS 100 01/17/2022 0937    AST 20 01/17/2022 0937    ALT 19 01/17/2022 0937    BILITOT <0.2 01/17/2022 0937          Total time 42 minutes- reviewing medical records & pre charting on the same day of visit,  history taking, exam, explaining medical diagnosis, management plan, ordering labs, filling medications, communicating findings with other providers and medical documentation in EHR.          Ashutosh Angelo MD 08/15/22

## 2022-09-13 RX ORDER — SECUKINUMAB 150 MG/ML
INJECTION SUBCUTANEOUS
Qty: 5 ADJUSTABLE DOSE PRE-FILLED PEN SYRINGE | Refills: 0 | Status: SHIPPED | OUTPATIENT
Start: 2022-09-13

## 2022-09-16 ENCOUNTER — OFFICE VISIT (OUTPATIENT)
Dept: INTERNAL MEDICINE CLINIC | Age: 62
End: 2022-09-16
Payer: COMMERCIAL

## 2022-09-16 VITALS
WEIGHT: 164 LBS | BODY MASS INDEX: 27.32 KG/M2 | SYSTOLIC BLOOD PRESSURE: 126 MMHG | HEIGHT: 65 IN | DIASTOLIC BLOOD PRESSURE: 70 MMHG

## 2022-09-16 DIAGNOSIS — H25.9 AGE-RELATED CATARACT OF BOTH EYES, UNSPECIFIED AGE-RELATED CATARACT TYPE: ICD-10-CM

## 2022-09-16 DIAGNOSIS — L40.50 PSORIATIC ARTHRITIS (HCC): ICD-10-CM

## 2022-09-16 DIAGNOSIS — I10 ESSENTIAL HYPERTENSION: ICD-10-CM

## 2022-09-16 DIAGNOSIS — Z01.818 PREOP EXAMINATION: Primary | ICD-10-CM

## 2022-09-16 DIAGNOSIS — E78.2 MIXED HYPERLIPIDEMIA: ICD-10-CM

## 2022-09-16 PROCEDURE — 99213 OFFICE O/P EST LOW 20 MIN: CPT | Performed by: INTERNAL MEDICINE

## 2022-09-16 SDOH — ECONOMIC STABILITY: FOOD INSECURITY: WITHIN THE PAST 12 MONTHS, THE FOOD YOU BOUGHT JUST DIDN'T LAST AND YOU DIDN'T HAVE MONEY TO GET MORE.: NEVER TRUE

## 2022-09-16 SDOH — ECONOMIC STABILITY: FOOD INSECURITY: WITHIN THE PAST 12 MONTHS, YOU WORRIED THAT YOUR FOOD WOULD RUN OUT BEFORE YOU GOT MONEY TO BUY MORE.: NEVER TRUE

## 2022-09-16 ASSESSMENT — PATIENT HEALTH QUESTIONNAIRE - PHQ9
SUM OF ALL RESPONSES TO PHQ9 QUESTIONS 1 & 2: 0
1. LITTLE INTEREST OR PLEASURE IN DOING THINGS: 0
SUM OF ALL RESPONSES TO PHQ QUESTIONS 1-9: 0
SUM OF ALL RESPONSES TO PHQ QUESTIONS 1-9: 0
2. FEELING DOWN, DEPRESSED OR HOPELESS: 0
SUM OF ALL RESPONSES TO PHQ QUESTIONS 1-9: 0
SUM OF ALL RESPONSES TO PHQ QUESTIONS 1-9: 0

## 2022-09-16 ASSESSMENT — SOCIAL DETERMINANTS OF HEALTH (SDOH): HOW HARD IS IT FOR YOU TO PAY FOR THE VERY BASICS LIKE FOOD, HOUSING, MEDICAL CARE, AND HEATING?: NOT HARD AT ALL

## 2022-09-16 NOTE — PROGRESS NOTES
Preoperative Consultation      Giana Joshua  YOB: 1960    Date of Service:  9/16/2022    Vitals:    09/16/22 1448   BP: 126/70   Site: Right Upper Arm   Weight: 164 lb (74.4 kg)   Height: 5' 5\" (1.651 m)      Wt Readings from Last 2 Encounters:   09/16/22 164 lb (74.4 kg)   08/15/22 160 lb (72.6 kg)     BP Readings from Last 3 Encounters:   09/16/22 126/70   08/15/22 110/76   04/15/22 118/74        Chief Complaint   Patient presents with    Pre-op Exam     Cataract surgery, 1st 9/26/22, second 10/7/22, SHAYY Arredondo     Allergies   Allergen Reactions    Sulfa Antibiotics Rash    Codeine      Outpatient Medications Marked as Taking for the 9/16/22 encounter (Office Visit) with Stacey Navarro MD   Medication Sig Dispense Refill    secukinumab (COSENTYX SENSOREADY PEN) 150 MG/ML SOAJ INJECT ONE PEN SUBCUTANEOUSLY ONCE WEEKLY FOR 5 WEEKS (WEEKS 0, 1, 2, 3, AND 4), THEN INJECT EVERY 4 WEEKS THEREAFTER. REFRIGERATE. 5 Adjustable Dose Pre-filled Pen Syringe 0    secukinumab (COSENTYX SENSOREADY PEN) 150 MG/ML SOAJ Inject 1 pen Q 28 days/ 1 pen 3    clonazePAM (KLONOPIN) 0.5 MG tablet Take 1 tablet by mouth daily as needed for Anxiety for up to 90 days. 90 tablet 0    Etanercept (ENBREL SURECLICK) 50 MG/ML SOAJ Inject 50 mg into the skin every 7 days 4 each 2    WYNZORA 0.005-0.064 % CREA Apply daily for psoriasis for up to 8 weeks.  60 g 1    hydroCHLOROthiazide (MICROZIDE) 12.5 MG capsule Take 12.5 mg by mouth daily      tretinoin (RETIN-A) 0.05 % cream Apply a pea sized amount to the face QHS 45 g 5    lisinopril (PRINIVIL;ZESTRIL) 20 MG tablet TAKE 1 TABLET DAILY 90 tablet 3    meloxicam (MOBIC) 15 MG tablet TAKE 1 TABLET DAILY 90 tablet 3    Calcipotriene (SORILUX) 0.005 % FOAM APPLY EXTERNALLY TO THE SCALP DAILY FOR PSORIASIS 120 g 5    atorvastatin (LIPITOR) 20 MG tablet TAKE 1 TABLET DAILY 90 tablet 3    azelastine (ASTELIN) 0.1 % nasal spray 2 sprays by Nasal route 2 times daily Use in each nostril as directed 1 each 11    fluocinolone (DERMA-SMOOTHE/FS SCALP) 0.01 % external oil Apply topically at bedtime with cap as directed. Wash in the morning. 120 mL 5    clobetasol (TEMOVATE) 0.05 % external solution Apply topically 2 times daily. 50 mL 5    clobetasol (TEMOVATE) 0.05 % ointment Apply to affected area BID PRN flares 60 g 1    CLOBEX SPRAY 0.05 % LIQD Apply to scalp daily - bid prn flares. 250 mL 3    linaCLOtide (LINZESS) 72 MCG CAPS capsule Take 1 capsule by mouth every morning (before breakfast) 90 capsule 3    zinc sulfate (ZINCATE) 220 (50 Zn) MG capsule Take 50 mg by mouth daily      Estradiol (VAGIFEM) 10 MCG TABS vaginal tablet INSERT 1 TABLET VAGINALLY TWICE WEEKLY      fluticasone (FLONASE) 50 MCG/ACT nasal spray 1 spray by Nasal route daily      Ascorbic Acid (VITAMIN C) 500 MG tablet Take 1,000 mg by mouth daily. Cholecalciferol (VITAMIN D) 1000 UNIT CAPS Take 1 capsule by mouth daily. Flaxseed, Linseed, (FLAXSEED OIL) 1000 MG CAPS Take  by mouth. Green Tea 250 MG CAPS Take  by mouth daily. PROBIOTIC CAPS Take  by mouth daily. This patient presents to the office today for a preoperative consultation at the request of surgeon, Dr. Graham Conte, who plans on performing cataract surgery  on 9/26 and 10/7 at [facility]. She has history of hypertension which is controlled with lisinopril. No chest pain or shortness of breath. She is able to perform greater than 4 METS without difficulty.     Planned anesthesia: Local and MAC   Known anesthesia problems: None   Bleeding risk: No recent or remote history of abnormal bleeding    Patient Active Problem List   Diagnosis    Vitamin D deficiency    Neoplasm of uncertain behavior of skin    Pituitary adenoma ([facility] [address]    Hemifacial spasm    Solar lentigo    Personal history of malignant neoplasm of ovary    Irritable bowel syndrome    Anxiety    Female infertility    Arthritis    Atypical nevi    Actinic keratosis    Psoriasis Unspecified polyarthropathy or polyarthritis, site unspecified    Hearing loss    Essential hypertension    Mixed hyperlipidemia    Seasonal allergic rhinitis due to pollen    Other constipation    Restless legs syndrome (RLS)    Psoriatic arthritis (HCC)    Vasomotor rhinitis       Past Medical History:   Diagnosis Date    Actinic keratosis 2010    Allergic rhinitis     Anxiety 2010    Cancer ([facility] [address]     ovarian    Chronic idiopathic constipation 2017    Hemifacial spasm     Botox    Hyperlipidemia     Osteoarthritis     Psoriasis     Psoriatic arthritis (HCC)      Past Surgical History:   Procedure Laterality Date    BRAIN SURGERY      for blood vessel abnormality    BREAST REDUCTION SURGERY       SECTION      COLONOSCOPY      FINGER SURGERY  2007    Left index finger     HYSTERECTOMY (CERVIX STATUS UNKNOWN)      INNER EAR SURGERY  2008    repair artery right ear    NEUROMA SURGERY      microvascular reconstruction to fix hemifacial spasm    RHINOPLASTY      SHOULDER SURGERY      left shoulder    AMRITA AND BSO (CERVIX REMOVED)      TOE SURGERY      TONSILLECTOMY      VARICOSE VEIN SURGERY      WRIST SURGERY      right wrist     Family History   Problem Relation Age of Onset    Heart Disease Father         chf    High Blood Pressure Father     High Cholesterol Father     Diabetes Father     Kidney Disease Father     High Blood Pressure Brother     Depression Brother     Diabetes Brother     Cancer Paternal Grandmother     Stroke Paternal Grandmother     Stroke Paternal Grandfather     Cancer Mother         skin    Cancer Sister         breast     Social History     Socioeconomic History    Marital status:      Spouse name: Not on file    Number of children: Not on file    Years of education: Not on file    Highest education level: Not on file   Occupational History    Not on file   Tobacco Use    Smoking status: Never    Smokeless tobacco: Never   Substance and Sexual Activity surgery as above. Known risk factors for perioperative complications: Hypertension  Current medications which may produce withdrawal symptoms if withheld perioperatively: none      Plan:     1. Preop examination  - Preoperative workup as follows: none  - Change in medication regimen before surgery: Hold all medications on morning of surgery  - Prophylaxis for cardiac events with perioperative beta-blockers: Not indicated  - No contraindications to planned surgery    2. Age-related cataract of both eyes, unspecified age-related cataract type  -Progressive, will have this surgically addressed    3. Essential hypertension  -Controlled on lisinopril 20 mg daily    4. Mixed hyperlipidemia  -On atorvastatin, lipid panel with next blood work  - Lipid Panel; Future    5.  Psoriatic arthritis ([facility] [address]  -Currently in a significant flare, will be starting Cosentyx and stopping Enbrel

## 2022-09-19 RX ORDER — PREDNISONE 10 MG/1
TABLET ORAL
Qty: 15 TABLET | Refills: 0 | Status: SHIPPED | OUTPATIENT
Start: 2022-09-19

## 2022-10-20 ENCOUNTER — PATIENT MESSAGE (OUTPATIENT)
Dept: DERMATOLOGY | Age: 62
End: 2022-10-20

## 2022-10-20 ENCOUNTER — PATIENT MESSAGE (OUTPATIENT)
Dept: INTERNAL MEDICINE CLINIC | Age: 62
End: 2022-10-20

## 2022-10-20 DIAGNOSIS — G25.81 RESTLESS LEGS SYNDROME (RLS): ICD-10-CM

## 2022-10-20 DIAGNOSIS — F41.9 ANXIETY: ICD-10-CM

## 2022-10-20 RX ORDER — CLONAZEPAM 0.5 MG/1
0.5 TABLET ORAL DAILY PRN
Qty: 90 TABLET | Refills: 0 | Status: SHIPPED | OUTPATIENT
Start: 2022-10-20 | End: 2023-01-18

## 2022-10-20 NOTE — TELEPHONE ENCOUNTER
From: Tez Morales  To: Dr. Ryne Curtis: 10/20/2022 4:51 PM EDT  Subject: refill needed    Can you please call into [address] a refill for my clonazepam tab 0.5 mg 90 days?      Thanks,  Michelle Pennington  : [birth year] Patient calling to request refill of HYDROcodone-acetaminophen 5-325 MG Oral Tab  gabapentin 300 MG Oral [address] [phone], [phone]    Patient informed of 48 hour refill policy excluding weekends and holidays. Informed patient prescription is sent directly to pharmacy. Further explained patient will not receive a call back once prescription is ready.

## 2022-10-21 NOTE — TELEPHONE ENCOUNTER
From: Aundria Schaumann  To: Dr. Jarquin Centers: 10/20/2022 4:54 PM EDT  Subject: 11/1 visit at 2:15    Just confirming this is my next appt. Sorry I missed the previous message.     Kiara Flor

## 2022-11-03 ENCOUNTER — PATIENT MESSAGE (OUTPATIENT)
Dept: INTERNAL MEDICINE CLINIC | Age: 62
End: 2022-11-03

## 2022-11-03 NOTE — TELEPHONE ENCOUNTER
From: Demetria Goldmann  To: Dr. Rudy Pereira: 11/3/2022 9:34 AM EDT  Subject: Refill Request    Can you please call [facility] and get me a 90 day refill on hydroCHLOROthiazide 12.5 MG capsule?     Thank you,  Annmarie Orta  : [birth year]

## 2022-11-04 RX ORDER — HYDROCHLOROTHIAZIDE 12.5 MG/1
12.5 CAPSULE, GELATIN COATED ORAL DAILY
Qty: 90 CAPSULE | Refills: 1 | Status: SHIPPED | OUTPATIENT
Start: 2022-11-04

## 2022-11-10 ENCOUNTER — TELEMEDICINE (OUTPATIENT)
Dept: INTERNAL MEDICINE CLINIC | Age: 62
End: 2022-11-10
Payer: COMMERCIAL

## 2022-11-10 DIAGNOSIS — F41.9 ANXIETY: ICD-10-CM

## 2022-11-10 DIAGNOSIS — G25.81 RESTLESS LEGS SYNDROME (RLS): Primary | ICD-10-CM

## 2022-11-10 DIAGNOSIS — L40.9 PSORIASIS: ICD-10-CM

## 2022-11-10 DIAGNOSIS — I10 ESSENTIAL HYPERTENSION: ICD-10-CM

## 2022-11-10 PROCEDURE — 99214 OFFICE O/P EST MOD 30 MIN: CPT | Performed by: INTERNAL MEDICINE

## 2022-11-10 ASSESSMENT — PATIENT HEALTH QUESTIONNAIRE - PHQ9
SUM OF ALL RESPONSES TO PHQ QUESTIONS 1-9: 0
SUM OF ALL RESPONSES TO PHQ9 QUESTIONS 1 & 2: 0
1. LITTLE INTEREST OR PLEASURE IN DOING THINGS: 0
SUM OF ALL RESPONSES TO PHQ QUESTIONS 1-9: 0
2. FEELING DOWN, DEPRESSED OR HOPELESS: 0
SUM OF ALL RESPONSES TO PHQ QUESTIONS 1-9: 0
SUM OF ALL RESPONSES TO PHQ QUESTIONS 1-9: 0

## 2022-11-10 NOTE — PROGRESS NOTES
Oralia Gurrola (:  1960) is a Established patient, here for evaluation of the following:    Assessment & Plan   Below is the assessment and plan developed based on review of pertinent history, physical exam, labs, studies, and medications. 1. Restless legs syndrome (RLS)   -Stable. Responds well to clonazepam 0.5 mg nightly, will continue this. She will notify the office when she needs a refill. 2. Anxiety   -Stable, she also notes some benefit when she takes clonazepam  3. Essential hypertension   -Controlled, continue lisinopril 20 mg daily, HCTZ 12.5 mg daily  4. Psoriasis   -Improved on Cosentyx    Return in about 3 months (around 2/10/2023) for sleep/RLS. Subjective   HPI    Taking clonazepam at night - helps with restless legs and sleep. No side effects. She does notice her restless leg symptoms starting in the evening. Cosentyx controlled psoriasis much better, she noticed a difference within days. A little more stiff but not in significantly more pain so we will see how the arthritis responds. Did well with cataract surgery, vision is better. Follow up MRI scheduled in December for meningioma that was noted on the last MRI. Review of Systems       Objective   Patient-Reported Vitals  No data recorded     Physical Exam  Vitals reviewed. Constitutional:       General: She is not in acute distress. Appearance: Normal appearance. She is well-developed. HENT:      Head: Normocephalic and atraumatic. Pulmonary:      Effort: Pulmonary effort is normal. No respiratory distress. Skin:     General: Skin is warm and dry. Neurological:      Mental Status: She is alert. Psychiatric:         Mood and Affect: Mood normal.         Behavior: Behavior normal.         Thought Content: Thought content normal.         Judgment: Judgment normal.                Oralia Gurrola, was evaluated through a synchronous (real-time) audio-video encounter.  The patient (or guardian if applicable) is aware that this is a billable service, which includes applicable co-pays. This Virtual Visit was conducted with patient's (and/or legal guardian's) consent. The visit was conducted pursuant to the emergency declaration under the [address], [address] [place] [address] [facility] authority and the [facility] and [place] [employer] Act. Patient identification was verified, and a caregiver was present when appropriate. The patient was located at Home: [place] [address]. Provider was located at [facility] ([address] [place], [place] [address]t): [phone] [address] [employer], [address] [place] [address],  [address].         --Farley Runner, MD

## 2022-11-11 NOTE — PROGRESS NOTES
[address] Yang MD                                                                                                [phone] (B) [phone] (F)      Primary provider: Khadra Cabello MD  Patient identification: Jt Gutiérrez: 1960,Sex: female         ASSESSMENT/PLAN:    Justin Au was seen today for follow-up. Diagnoses and all orders for this visit:    Psoriatic arthritis ([address]    Psoriasis    High risk medication use    Primary osteoarthritis involving multiple joints    Other orders  -     secukinumab (COSENTYX SENSOREADY PEN) 150 MG/ML SOAJ; Inject 1 pen Q 28 days/      Ovarian cancer 2003- mucinous adenocarcinoma, surgery, Chemo- cancer free- follows up with oncologist.  Onset at the age of 24- started with Dactylitis, then progressed to wide spread inflammatory arthritis. Started with Medrol, Sulfasalazine, Enbrel-early 2000's. A/P Today's visit-    Started Cosentyx(September 2022), completed loading dose and first maintenance dose. 1.  Psoriatic arthritis-Minor arthralgias, unlikely from psoriatic arthritis. Suspect from osteoarthritis. No overt flares of inflammatory arthritis. 2.  Skin psoriasis-asymptomatic. Continue current dose of Cosentyx. Acetaminophen as needed for intercurrent arthralgias. Prior medications-   Enbrel since early 2000's-2022-lost efficacy    3. 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 RTC 3 months. Patient indicates understanding and agrees with the management plan. I reviewed patients medical information and medical history in the medical records. Note is transcribed using voice recognition software. Inadvertent computerized transcription errors may be present. ##################################################################    Subjective:    Follow-up for psoriasis, psoriatic arthritis and osteoarthritis. Interval changes-  She noticed dramatic improvement in her psoriasis just 1 week after starting Cosentyx. She was asymptomatic when she did the loading dose of Cosentyx, but started noticing arthralgias around third week after she started maintenance dose of Cosentyx. Arthralgias mainly in her shoulders, some in her DIP joints. No overt flares. States that she is not sure if it is from weather changes, horse riding or her physical activity or psoriatic arthritis, nonetheless joint symptoms are  not limiting her ADLs or recreational activities. No side effects from medications. No bowel symptoms. She does not have history of inflammatory bowel diseases. All other review of systems are negative. Past medical, surgical history, social history allergies Meds reviewed. Current Outpatient Medications   Medication Sig Dispense Refill    secukinumab (COSENTYX SENSOREADY PEN) 150 MG/ML SOAJ Inject 1 pen Q 28 days/ 1 Adjustable Dose Pre-filled Pen Syringe 3    hydroCHLOROthiazide (MICROZIDE) 12.5 MG capsule Take 1 capsule by mouth daily 90 capsule 1    clonazePAM (KLONOPIN) 0.5 MG tablet Take 1 tablet by mouth daily as needed for Anxiety for up to 90 days. 90 tablet 0    predniSONE (DELTASONE) 10 MG tablet Take 2 tab daily 4 days. 1 tab po daily 4 days. 1/2 tab po daily x 4 days and stop. 15 tablet 0    WYNZORA 0.005-0.064 % CREA Apply daily for psoriasis for up to 8 weeks.  60 g 1    tretinoin (RETIN-A) 0.05 % cream Apply a pea sized amount to the face QHS 45 g 5    lisinopril (PRINIVIL;ZESTRIL) 20 MG tablet TAKE 1 TABLET DAILY 90 tablet 3    meloxicam (MOBIC) 15 MG tablet TAKE 1 TABLET DAILY 90 tablet 3    Calcipotriene (SORILUX) 0.005 % FOAM APPLY EXTERNALLY TO THE SCALP DAILY FOR PSORIASIS 120 g 5    atorvastatin (LIPITOR) 20 MG tablet TAKE 1 TABLET DAILY 90 tablet 3    azelastine (ASTELIN) 0.1 % nasal spray 2 sprays by Nasal route 2 times daily Use in each nostril as directed 1 each 11 fluocinolone (DERMA-SMOOTHE/FS SCALP) 0.01 % external oil Apply topically at bedtime with cap as directed. Wash in the morning. 120 mL 5    clobetasol (TEMOVATE) 0.05 % external solution Apply topically 2 times daily. 50 mL 5    clobetasol (TEMOVATE) 0.05 % ointment Apply to affected area BID PRN flares 60 g 1    CLOBEX SPRAY 0.05 % LIQD Apply to scalp daily - bid prn flares. 250 mL 3    linaCLOtide (LINZESS) 72 MCG CAPS capsule Take 1 capsule by mouth every morning (before breakfast) 90 capsule 3    zinc sulfate (ZINCATE) 220 (50 Zn) MG capsule Take 50 mg by mouth daily      Estradiol (VAGIFEM) 10 MCG TABS vaginal tablet INSERT 1 TABLET VAGINALLY TWICE WEEKLY      fluticasone (FLONASE) 50 MCG/ACT nasal spray 1 spray by Nasal route daily      Ascorbic Acid (VITAMIN C) 500 MG tablet Take 1,000 mg by mouth daily. Cholecalciferol (VITAMIN D) 1000 UNIT CAPS Take 1 capsule by mouth daily. Flaxseed, Linseed, (FLAXSEED OIL) 1000 MG CAPS Take  by mouth. Green Tea 250 MG CAPS Take  by mouth daily. PROBIOTIC CAPS Take  by mouth daily. No current facility-administered medications for this visit. Allergies   Allergen Reactions    Sulfa Antibiotics Rash    Codeine          OBJECTIVE  Physical    Vitals:    11/14/22 0750   BP: 120/76         General appearance/psychiatric: Well nourished and well groomed, normal judgment. Alert, appears stated age and cooperative. MKS:     28 joint JOINT COUNT:28 joint count 0                               Right                                                  Left   Swell Tender Swell Tender   PIP1           PIP2           PIP3           PIP4          PIP5          MCP1           MCP2           MCP3           MCP4           MCP5           Wrist           Elbow           Shoulder          Knee           Other than OA changes-no appreciable tender, swollen or inflamed joints in upper or lower extremities, F ROM in all peripheral joints.   Small Heberden's nodules in her both index finger DIPs and long finger-right. Subtle OA changes across her CMC's, PIPs. Normal gait. No dactylitis or enthesitis. Skin: No psoriasis at this time. Data:  Lab Results   Component Value Date/Time    WBC 4.8 01/17/2022 09:37 AM    RBC 5.15 01/17/2022 09:37 AM    HGB 13.6 01/17/2022 09:37 AM    HCT 42.3 01/17/2022 09:37 AM     01/17/2022 09:37 AM    MCV 82.3 01/17/2022 09:37 AM    MCH 26.5 01/17/2022 09:37 AM    MCHC 32.2 01/17/2022 09:37 AM    RDW 13.6 01/17/2022 09:37 AM    SEGSPCT 55.3 03/15/2013 07:11 AM    LYMPHOPCT 30.5 01/17/2022 09:37 AM    MONOPCT 10.3 01/17/2022 09:37 AM    BASOPCT 1.2 01/17/2022 09:37 AM    MONOSABS 0.5 01/17/2022 09:37 AM    LYMPHSABS 1.5 01/17/2022 09:37 AM    EOSABS 0.2 01/17/2022 09:37 AM    BASOSABS 0.1 01/17/2022 09:37 AM    DIFFTYPE Auto-K 03/15/2013 07:11 AM       Chemistry        Component Value Date/Time     01/17/2022 0937    K 4.4 01/17/2022 0937     01/17/2022 0937    CO2 25 01/17/2022 0937    BUN 22 (H) 01/17/2022 0937    CREATININE 0.8 01/17/2022 0937        Component Value Date/Time    CALCIUM 9.6 01/17/2022 0937    ALKPHOS 100 01/17/2022 0937    AST 20 01/17/2022 0937    ALT 19 01/17/2022 0937    BILITOT <0.2 01/17/2022 0937          Total time 34minutes- reviewing medical records & pre charting on the same day of visit,  history taking, exam, explaining medical diagnosis, management plan, ordering labs, filling medications, communicating findings with other providers and medical documentation in EHR.          Raya Leblanc MD 11/14/22

## 2022-11-14 ENCOUNTER — OFFICE VISIT (OUTPATIENT)
Dept: RHEUMATOLOGY | Age: 62
End: 2022-11-14
Payer: COMMERCIAL

## 2022-11-14 VITALS
SYSTOLIC BLOOD PRESSURE: 120 MMHG | DIASTOLIC BLOOD PRESSURE: 76 MMHG | BODY MASS INDEX: 27.32 KG/M2 | HEIGHT: 65 IN | WEIGHT: 164 LBS

## 2022-11-14 DIAGNOSIS — E78.2 MIXED HYPERLIPIDEMIA: ICD-10-CM

## 2022-11-14 DIAGNOSIS — Z79.899 HIGH RISK MEDICATION USE: ICD-10-CM

## 2022-11-14 DIAGNOSIS — M15.9 PRIMARY OSTEOARTHRITIS INVOLVING MULTIPLE JOINTS: ICD-10-CM

## 2022-11-14 DIAGNOSIS — L40.9 PSORIASIS: ICD-10-CM

## 2022-11-14 DIAGNOSIS — L40.50 PSORIATIC ARTHRITIS (HCC): Primary | ICD-10-CM

## 2022-11-14 LAB
A/G RATIO: 2.2 (ref 1.1–2.2)
ALBUMIN SERPL-MCNC: 4.3 G/DL (ref 3.4–5)
ALP BLD-CCNC: 109 U/L (ref 40–129)
ALT SERPL-CCNC: 24 U/L (ref 10–40)
ANION GAP SERPL CALCULATED.3IONS-SCNC: 13 MMOL/L (ref 3–16)
AST SERPL-CCNC: 21 U/L (ref 15–37)
BASOPHILS ABSOLUTE: 0 K/UL (ref 0–0.2)
BASOPHILS RELATIVE PERCENT: 0.7 %
BILIRUB SERPL-MCNC: 0.3 MG/DL (ref 0–1)
BUN BLDV-MCNC: 21 MG/DL (ref 7–20)
C-REACTIVE PROTEIN: <3 MG/L (ref 0–5.1)
CALCIUM SERPL-MCNC: 9.7 MG/DL (ref 8.3–10.6)
CHLORIDE BLD-SCNC: 105 MMOL/L (ref 99–110)
CHOLESTEROL, TOTAL: 174 MG/DL (ref 0–199)
CO2: 26 MMOL/L (ref 21–32)
CREAT SERPL-MCNC: 0.8 MG/DL (ref 0.6–1.2)
EOSINOPHILS ABSOLUTE: 0.1 K/UL (ref 0–0.6)
EOSINOPHILS RELATIVE PERCENT: 1.5 %
GFR SERPL CREATININE-BSD FRML MDRD: >60 ML/MIN/{1.73_M2}
GLUCOSE BLD-MCNC: 99 MG/DL (ref 70–99)
HCT VFR BLD CALC: 43.8 % (ref 36–48)
HDLC SERPL-MCNC: 45 MG/DL (ref 40–60)
HEMOGLOBIN: 14.1 G/DL (ref 12–16)
HEPATITIS B SURFACE ANTIGEN INTERPRETATION: NORMAL
HEPATITIS C ANTIBODY INTERPRETATION: NORMAL
LDL CHOLESTEROL CALCULATED: 95 MG/DL
LYMPHOCYTES ABSOLUTE: 1.5 K/UL (ref 1–5.1)
LYMPHOCYTES RELATIVE PERCENT: 29.5 %
MCH RBC QN AUTO: 26.3 PG (ref 26–34)
MCHC RBC AUTO-ENTMCNC: 32.2 G/DL (ref 31–36)
MCV RBC AUTO: 81.7 FL (ref 80–100)
MONOCYTES ABSOLUTE: 0.4 K/UL (ref 0–1.3)
MONOCYTES RELATIVE PERCENT: 8 %
NEUTROPHILS ABSOLUTE: 3.1 K/UL (ref 1.7–7.7)
NEUTROPHILS RELATIVE PERCENT: 60.3 %
PDW BLD-RTO: 13.2 % (ref 12.4–15.4)
PLATELET # BLD: 280 K/UL (ref 135–450)
PMV BLD AUTO: 8.5 FL (ref 5–10.5)
POTASSIUM SERPL-SCNC: 4.2 MMOL/L (ref 3.5–5.1)
RBC # BLD: 5.36 M/UL (ref 4–5.2)
SEDIMENTATION RATE, ERYTHROCYTE: 19 MM/HR (ref 0–30)
SODIUM BLD-SCNC: 144 MMOL/L (ref 136–145)
TOTAL PROTEIN: 6.3 G/DL (ref 6.4–8.2)
TRIGL SERPL-MCNC: 169 MG/DL (ref 0–150)
VLDLC SERPL CALC-MCNC: 34 MG/DL
WBC # BLD: 5.2 K/UL (ref 4–11)

## 2022-11-14 PROCEDURE — 3074F SYST BP LT 130 MM HG: CPT | Performed by: INTERNAL MEDICINE

## 2022-11-14 PROCEDURE — 3078F DIAST BP <80 MM HG: CPT | Performed by: INTERNAL MEDICINE

## 2022-11-14 PROCEDURE — 99214 OFFICE O/P EST MOD 30 MIN: CPT | Performed by: INTERNAL MEDICINE

## 2022-11-14 RX ORDER — SECUKINUMAB 150 MG/ML
INJECTION SUBCUTANEOUS
Qty: 1 ADJUSTABLE DOSE PRE-FILLED PEN SYRINGE | Refills: 3 | Status: SHIPPED | OUTPATIENT
Start: 2022-11-14

## 2022-11-16 LAB
QUANTI TB GOLD PLUS: NEGATIVE
QUANTI TB1 MINUS NIL: 0 IU/ML (ref 0–0.34)
QUANTI TB2 MINUS NIL: 0 IU/ML (ref 0–0.34)
QUANTIFERON MITOGEN: >10 IU/ML
QUANTIFERON NIL: 0.02 IU/ML

## 2022-11-21 ENCOUNTER — OFFICE VISIT (OUTPATIENT)
Dept: DERMATOLOGY | Age: 62
End: 2022-11-21
Payer: COMMERCIAL

## 2022-11-21 DIAGNOSIS — L82.1 SEBORRHEIC KERATOSIS: ICD-10-CM

## 2022-11-21 DIAGNOSIS — L40.9 PSORIASIS: ICD-10-CM

## 2022-11-21 DIAGNOSIS — Z86.018 HISTORY OF DYSPLASTIC NEVUS: ICD-10-CM

## 2022-11-21 DIAGNOSIS — Z87.2 HISTORY OF ACTINIC KERATOSES: ICD-10-CM

## 2022-11-21 DIAGNOSIS — D22.9 MULTIPLE NEVI: Primary | ICD-10-CM

## 2022-11-21 DIAGNOSIS — D48.5 NEOPLASM OF UNCERTAIN BEHAVIOR OF SKIN: ICD-10-CM

## 2022-11-21 PROCEDURE — 99214 OFFICE O/P EST MOD 30 MIN: CPT | Performed by: DERMATOLOGY

## 2022-11-21 PROCEDURE — 11102 TANGNTL BX SKIN SINGLE LES: CPT | Performed by: DERMATOLOGY

## 2022-11-21 NOTE — PATIENT INSTRUCTIONS
Following the procedure, the treated areas may be red or appear bruised and will likely be swollen for a few hours or up to a few days. The affected areas should be treated delicately following treatment. Discomfort and swelling should be treated with the application of hourly cool compresses the evening of the procedure. Avoid applying ice directly to the skin. If your face was treated, you may want to sleep with your head elevated on an extra pillow to help minimize swelling. Wear sunscreen daily and avoid strenuous activity following rosacea treatments to optimize results. Avoid extra aspirin, ibuprofen, vitamin E following the procedure - this may increase your chance of bruising. Improper care of the treated area while the discoloration is present may increase the chance of scarring or skin textural changes to the treated area. Please call the office if you have excessive discomfort, herpes simplex virus flare, or burns, blisters, or scabbing. Biopsy Wound Care Instructions    Keep the bandage in place for 24 hours. Cleanse the wound with mild soapy water daily  Gently dry the area. Apply Vaseline or petroleum jelly to the wound using a cotton tipped applicator. Cover with a clean bandage. Repeat this process until the biopsy site is healed. If you had stitches placed, continue treating the site until the stitches are removed. Remember to make an appointment to return to have your stitches removed by our staff. You may shower and bathe as usual.       ** Biopsy results generally take around 7 business days to come back. If you have not heard from us by then, please call the office at [phone]. *Please note that biopsy results are released to both the patient and physician at the same time in [employer] [employer]. Please allow time for your physician to review the results. One of our staff members will reach out to you with the results and plan.

## 2022-11-21 NOTE — PROGRESS NOTES
[facility]  [facility] Rameshrey      Kevin Longoria  1960    63 y.o. female     Date of Visit: 11/21/2022    Last seen: 5-2022    Chief Complaint: moles/lesions, f/u psoriasis  Chief Complaint   Patient presents with    Skin Exam     6 mo FSE     HX: multi nevi     History of Present Illness:  *went [place] [employer] [place] [address] in 20[place]    1. Mult nevi - here for full skin check; no new concerns or changing lesions. 2. F/u for AKs - no new concerns or probs with previous sites. 3. Hx of psoriasis, mainly scalp (on Cosentyx - changed from Enbrel since last seen for PsA arthritis) - sees Dr. Randall Tatum ; fairly well-controlled with clobex spray/soluntion as needed flares on the scalp + sorilux and occasional dermasmoothe and clobetasol oint prn flares elsewhere. She has been flaring on the occipital scalp and a few lesions on the distal LE's currently. Added Sorilux foam more recently for recent scalp flares and this has helped some. Better since last seen with change to Cosentyx (by rheum). 4.  S/p bx of lesion on the left upper back at past visit - read as mod dysplastic nevus. Had recurrence of pigment here last visit in October 2020 so repeat biopsy was done and read as junctional recurrence of a previous biopsy nevus, no atypia seen. 5. She has a concerning lesion on the R shoulder. Asx.     6. She has a new dimple on the R lower eyelid at the lash line - noticed in the past month or two. Asx. [facility] - bx 2021    S/p excision of 2 cysts on the back in 2018 - one had ruptured back in the summer -  Ended up in ED and had I/D with packing and abx - clinda then doxy d/t culture grew staph lugdunensis prior to excision. Hx of ovarian cancer; her sister was diagnosed with breast CA. In the past few years - her sister was BRCA neg. She has had her veins treated. Review of Systems:  Gen: Feels well, good sense of health.     Skin: No changing moles vaginal tablet INSERT 1 TABLET VAGINALLY TWICE WEEKLY      fluticasone (FLONASE) 50 MCG/ACT nasal spray 1 spray by Nasal route daily      Ascorbic Acid (VITAMIN C) 500 MG tablet Take 1,000 mg by mouth daily. Cholecalciferol (VITAMIN D) 1000 UNIT CAPS Take 1 capsule by mouth daily. Flaxseed, Linseed, (FLAXSEED OIL) 1000 MG CAPS Take  by mouth. Green Tea 250 MG CAPS Take  by mouth daily. PROBIOTIC CAPS Take  by mouth daily. Allergies   Allergen Reactions    Sulfa Antibiotics Rash    Codeine        Past Medical History:   Diagnosis Date    Actinic keratosis 2010    Allergic rhinitis     Anxiety 2010    Cancer ([facility] [address]     ovarian    Chronic idiopathic constipation 2017    Hemifacial spasm     Botox    Hyperlipidemia     Osteoarthritis     Psoriasis     Psoriatic arthritis ([facility] [address]      Past Surgical History:   Procedure Laterality Date    BRAIN SURGERY      for blood vessel abnormality    BREAST REDUCTION SURGERY       SECTION      COLONOSCOPY      FINGER SURGERY      Left index finger     HYSTERECTOMY (CERVIX STATUS UNKNOWN)      INNER EAR SURGERY      repair artery right ear    NEUROMA SURGERY      microvascular reconstruction to fix hemifacial spasm    RHINOPLASTY      SHOULDER SURGERY      left shoulder    AMRITA AND BSO (CERVIX REMOVED)      TOE SURGERY      TONSILLECTOMY      VARICOSE VEIN SURGERY      WRIST SURGERY      right wrist       Physical Examination     Gen, well-appearing  FSE today    trunk and extremities with scattered brown macules and papules   No AK's  Occipital scalp with focal faint erythematous mildly scaly patches  Extensor FA's and shins with few faint erythwmtous dry macules remaining  Left upper back, near linear scar from cyst removal -scar clear  R anterior shoulder with bright pink scaled macule  R lower eyelid with dimple at lashline    Assessment and Plan     1.  Benign-appearing nevi and few SKs  2. AK's - no new concerns  - educ

## 2022-11-23 LAB — DERMATOLOGY PATHOLOGY REPORT: NORMAL

## 2022-11-30 DIAGNOSIS — D49.6 BRAIN NEOPLASM (HCC): ICD-10-CM

## 2022-11-30 LAB
CREAT SERPL-MCNC: 0.9 MG/DL (ref 0.6–1.2)
GFR SERPL CREATININE-BSD FRML MDRD: >60 ML/MIN/{1.73_M2}

## 2022-12-13 ENCOUNTER — HOSPITAL ENCOUNTER (OUTPATIENT)
Dept: MRI IMAGING | Age: 62
Discharge: HOME | End: 2022-12-13
Payer: COMMERCIAL

## 2022-12-13 DIAGNOSIS — D49.6 BRAIN NEOPLASM (HCC): ICD-10-CM

## 2022-12-13 PROCEDURE — 6360000004 HC RX CONTRAST MEDICATION: Performed by: NEUROLOGICAL SURGERY

## 2022-12-13 PROCEDURE — 70553 MRI BRAIN STEM W/O & W/DYE: CPT

## 2022-12-13 PROCEDURE — A9576 INJ PROHANCE MULTIPACK: HCPCS | Performed by: NEUROLOGICAL SURGERY

## 2022-12-13 RX ADMIN — GADOTERIDOL 16 ML: 279.3 INJECTION, SOLUTION INTRAVENOUS at 16:28

## 2023-01-04 RX ORDER — SECUKINUMAB 150 MG/ML
INJECTION SUBCUTANEOUS
Qty: 2 ADJUSTABLE DOSE PRE-FILLED PEN SYRINGE | Refills: 5 | Status: SHIPPED | OUTPATIENT
Start: 2023-01-04 | End: 2023-01-05 | Stop reason: SDUPTHER

## 2023-01-05 RX ORDER — SECUKINUMAB 150 MG/ML
INJECTION SUBCUTANEOUS
Qty: 2 ADJUSTABLE DOSE PRE-FILLED PEN SYRINGE | Refills: 5 | Status: SHIPPED | OUTPATIENT
Start: 2023-01-05

## 2023-01-19 ENCOUNTER — TELEPHONE (OUTPATIENT)
Dept: INTERNAL MEDICINE CLINIC | Age: 63
End: 2023-01-19

## 2023-01-19 RX ORDER — CYCLOBENZAPRINE HCL 5 MG
TABLET ORAL
Qty: 60 TABLET | Refills: 0 | Status: SHIPPED | OUTPATIENT
Start: 2023-01-19

## 2023-01-19 NOTE — TELEPHONE ENCOUNTER
Pt states she has been having muscle spasms and she is requesting a prescription for muscle relaxer's    Please advise. Mio Camejo

## 2023-01-30 ENCOUNTER — OFFICE VISIT (OUTPATIENT)
Dept: ENT CLINIC | Age: 63
End: 2023-01-30
Payer: COMMERCIAL

## 2023-01-30 VITALS
SYSTOLIC BLOOD PRESSURE: 145 MMHG | HEART RATE: 99 BPM | WEIGHT: 165 LBS | BODY MASS INDEX: 27.49 KG/M2 | HEIGHT: 65 IN | DIASTOLIC BLOOD PRESSURE: 81 MMHG | TEMPERATURE: 98 F

## 2023-01-30 DIAGNOSIS — R04.0 EPISTAXIS: Primary | ICD-10-CM

## 2023-01-30 PROCEDURE — 3079F DIAST BP 80-89 MM HG: CPT | Performed by: OTOLARYNGOLOGY

## 2023-01-30 PROCEDURE — 3077F SYST BP >= 140 MM HG: CPT | Performed by: OTOLARYNGOLOGY

## 2023-01-30 PROCEDURE — 99214 OFFICE O/P EST MOD 30 MIN: CPT | Performed by: OTOLARYNGOLOGY

## 2023-01-30 ASSESSMENT — ENCOUNTER SYMPTOMS
SINUS PAIN: 0
EYE PAIN: 0
RHINORRHEA: 0
TROUBLE SWALLOWING: 0
EYE ITCHING: 0
VOICE CHANGE: 0
DIARRHEA: 0
CHOKING: 0
COUGH: 0
EYE REDNESS: 0
NAUSEA: 0
SHORTNESS OF BREATH: 0
FACIAL SWELLING: 0
SINUS PRESSURE: 0
SORE THROAT: 0

## 2023-01-30 NOTE — PROGRESS NOTES
Subjective:      Patient ID: Lona Acevedo is a 63 y.o. female. HPI  Chief Complaint   Patient presents with    Nose bleed  History of Present Loc Beal is a(n) 63 y.o. female who presents with a one week history of left sided epistaxis. [place] Greet off horse. Packed twice. Surgery lest Friday with Marjan Juan. Now congested. Here for discussion pn post operative management. Not happy with [facility]'s performance and information.      Patient Active Problem List   Diagnosis    Vitamin D deficiency    Neoplasm of uncertain behavior of skin    Pituitary adenoma ([address]    Hemifacial spasm    Solar lentigo    Personal history of malignant neoplasm of ovary    Irritable bowel syndrome    Anxiety    Female infertility    Arthritis    Atypical nevi    Actinic keratosis    Psoriasis    Unspecified polyarthropathy or polyarthritis, site unspecified    Hearing loss    Essential hypertension    Mixed hyperlipidemia    Seasonal allergic rhinitis due to pollen    Other constipation    Restless legs syndrome (RLS)    Psoriatic arthritis (HCC)    Vasomotor rhinitis     Past Surgical History:   Procedure Laterality Date    BRAIN SURGERY      for blood vessel abnormality    BREAST REDUCTION SURGERY       SECTION      COLONOSCOPY      FINGER SURGERY  2007    Left index finger     HYSTERECTOMY (CERVIX STATUS UNKNOWN)      INNER EAR SURGERY  2008    repair artery right ear    NEUROMA SURGERY      microvascular reconstruction to fix hemifacial spasm    RHINOPLASTY      SHOULDER SURGERY      left shoulder    AMRITA AND BSO (CERVIX REMOVED)      TOE SURGERY      TONSILLECTOMY      VARICOSE VEIN SURGERY      WRIST SURGERY      right wrist     Family History   Problem Relation Age of Onset    Heart Disease Father         chf    High Blood Pressure Father     High Cholesterol Father     Diabetes Father     Kidney Disease Father     High Blood Pressure Brother     Depression Brother     Diabetes Brother     Cancer Paternal Grandmother Stroke Paternal Grandmother     Stroke Paternal Grandfather     Cancer Mother         skin    Cancer Sister         breast     Social History     Socioeconomic History    Marital status:      Spouse name: Not on file    Number of children: Not on file    Years of education: Not on file    Highest education level: Not on file   Occupational History    Not on file   Tobacco Use    Smoking status: Never    Smokeless tobacco: Never   Substance and Sexual Activity    Alcohol use: Yes    Drug use: No    Sexual activity: Yes   Other Topics Concern    Not on file   Social History Narrative    Not on file     Social Determinants of Health     Financial Resource Strain: Low Risk     Difficulty of Paying Living Expenses: Not hard at all   Food Insecurity: No Food Insecurity    Worried About Running Out of Food in the Last Year: Never true    Ran Out of Food in the Last Year: Never true   Transportation Needs: Not on file   Physical Activity: Not on file   Stress: Not on file   Social Connections: Not on file   Intimate Partner Violence: Not on file   Housing Stability: Not on file       DRUG/FOOD ALLERGIES: Sulfa antibiotics and Codeine    CURRENT MEDICATIONS  Prior to Admission medications    Medication Sig Start Date End Date Taking? Authorizing Provider   cyclobenzaprine (FLEXERIL) 5 MG tablet Take 1-2 tabs three times daily as needed for muscle spasms 1/19/23  Yes Maxx Win MD   secukinumab (COSENTYX SENSOREADY PEN) 150 MG/ML SOAJ Inject 2 pen Q 28 days/ 1/5/23  Yes Elmira Shaffer MD   hydroCHLOROthiazide (MICROZIDE) 12.5 MG capsule Take 1 capsule by mouth daily 11/4/22  Yes Maxx Win MD   WYNZORA 0.005-0.064 % CREA Apply daily for psoriasis for up to 8 weeks.  5/10/22  Yes Louis Washington MD   tretinoin (RETIN-A) 0.05 % cream Apply a pea sized amount to the face Q 4/25/22  Yes Louis Washington MD   lisinopril (PRINIVIL;ZESTRIL) 20 MG tablet TAKE 1 TABLET DAILY 4/18/22  Yes Maxx Win MD   meloxicam (MOBIC) 15 MG tablet TAKE 1 TABLET DAILY 4/18/22  Yes Anju Tate MD   Calcipotriene (SORILUX) 0.005 % FOAM APPLY EXTERNALLY TO THE SCALP DAILY FOR PSORIASIS 2/23/22  Yes Makeda Espinal MD   atorvastatin (LIPITOR) 20 MG tablet TAKE 1 TABLET DAILY 2/10/22  Yes Anju Tate MD   fluocinolone (DERMA-SMOOTHE/FS SCALP) 0.01 % external oil Apply topically at bedtime with cap as directed.  Wash in the morning. 11/8/21  Yes Makeda Espinal MD   clobetasol (TEMOVATE) 0.05 % external solution Apply topically 2 times daily. 11/8/21  Yes Makeda Espinal MD   CLOBEX SPRAY 0.05 % LIQD Apply to scalp daily - bid prn flares. 11/8/21  Yes Makeda Espinal MD   linaCLOtide (LINZESS) 72 MCG CAPS capsule Take 1 capsule by mouth every morning (before breakfast) 3/4/21  Yes Nish Ibanez MD   zinc sulfate (ZINCATE) 220 (50 Zn) MG capsule Take 50 mg by mouth daily   Yes Historical Provider, MD   Estradiol (VAGIFEM) 10 MCG TABS vaginal tablet INSERT 1 TABLET VAGINALLY TWICE WEEKLY 7/5/18  Yes Historical Provider, MD   fluticasone (FLONASE) 50 MCG/ACT nasal spray 1 spray by Nasal route daily   Yes Historical Provider, MD   Ascorbic Acid (VITAMIN C) 500 MG tablet Take 1,000 mg by mouth daily.   Yes Historical Provider, MD   Cholecalciferol (VITAMIN D) 1000 UNIT CAPS Take 1 capsule by mouth daily. 2/28/11  Yes Historical Provider, MD   Flaxseed, Linseed, (FLAXSEED OIL) 1000 MG CAPS Take  by mouth.   Yes Historical Provider, MD   Green Tea 250 MG CAPS Take  by mouth daily. 6/28/10  Yes Historical Provider, MD   PROBIOTIC CAPS Take  by mouth daily. 6/28/10  Yes Historical Provider, MD   clonazePAM (KLONOPIN) 0.5 MG tablet Take 1 tablet by mouth daily as needed for Anxiety for up to 90 days. 10/20/22 1/18/23  Anju Tate MD         Lab Studies:  Lab Results   Component Value Date    WBC 5.2 11/14/2022    HGB 14.1 11/14/2022    HCT 43.8 11/14/2022    MCV 81.7 11/14/2022     11/14/2022     Lab  Results   Component Value Date    GLUCOSE 99 11/14/2022    BUN 21 (H) 11/14/2022    CREATININE 0.9 11/30/2022    K 4.2 11/14/2022     11/14/2022     11/14/2022    CALCIUM 9.7 11/14/2022     No results found for: MG  Lab Results   Component Value Date    PHOS 4.3 06/17/2015     Lab Results   Component Value Date    ALKPHOS 109 11/14/2022    ALT 24 11/14/2022    AST 21 11/14/2022    BILITOT 0.3 11/14/2022    PROT 6.3 (L) 11/14/2022      Review of Systems   Constitutional:  Negative for activity change, appetite change, chills, fatigue and fever. HENT:  Positive for congestion. Negative for ear discharge, ear pain, facial swelling, hearing loss, nosebleeds, postnasal drip, rhinorrhea, sinus pressure, sinus pain, sneezing, sore throat, tinnitus, trouble swallowing and voice change. Eyes:  Negative for pain, redness, itching and visual disturbance. Respiratory:  Negative for cough, choking and shortness of breath. Gastrointestinal:  Negative for diarrhea and nausea. Endocrine: Negative for cold intolerance and heat intolerance. Objective:   Physical Exam  Constitutional:       Appearance: She is well-developed. HENT:      Head: Not macrocephalic and not microcephalic. No Escalera's sign, abrasion, right periorbital erythema, left periorbital erythema or laceration. Nose: No nasal deformity, septal deviation, laceration, mucosal edema or rhinorrhea. Right Nostril: No epistaxis or occlusion. Left Nostril: No epistaxis or occlusion. Right Turbinates: Not enlarged, swollen or pale. Left Turbinates: Not enlarged, swollen or pale. Right Sinus: No maxillary sinus tenderness or frontal sinus tenderness. Left Sinus: No maxillary sinus tenderness or frontal sinus tenderness. Mouth/Throat:      Lips: No lesions. Mouth: Mucous membranes are moist.      Tongue: No lesions. Palate: No mass. Pharynx: Uvula midline.  No oropharyngeal exudate or posterior oropharyngeal erythema. Tonsils: No tonsillar abscesses. Eyes:      General:         Right eye: No discharge. Left eye: No discharge. Extraocular Movements:      Right eye: Normal extraocular motion. Left eye: Normal extraocular motion. Conjunctiva/sclera:      Right eye: Right conjunctiva is not injected. No chemosis or exudate. Left eye: Left conjunctiva is not injected. No chemosis or exudate. Neck:      Thyroid: No thyroid mass or thyromegaly. Cardiovascular:      Rate and Rhythm: Normal rate and regular rhythm. Pulmonary:      Effort: No tachypnea or respiratory distress. Lymphadenopathy:      Head:      Right side of head: No preauricular or posterior auricular adenopathy. Left side of head: No preauricular or posterior auricular adenopathy. Cervical:      Right cervical: No superficial, deep or posterior cervical adenopathy. Left cervical: No superficial, deep or posterior cervical adenopathy. Neurological:      Mental Status: She is alert and oriented to person, place, and time. CBC  [facility]  01/27/2023  Component      WBC   RBC   Hematocrit Blood   MCV   MCH   MCHC   RDW   Platelets   MPV   Hemoglobin   nRBC     Component 01/27/2023 01/26/2023 01/26/2023 01/26/2023 01/26/2023 01/25/2023 01/25/2023 01/25/2023 09/15/2021               WBC -- -- -- 11.78 High     -- 9.0 -- 7.7 5.0   RBC -- -- -- 4.84 -- 5.06 -- 5.04 5.33 High        Hematocrit Blood 37.9 38.0 39.4 40.1 -- 41.7 41.0 42.1 43.8   MCV 84.2 83.2 82.6 82.9 -- 82.4 82.8 83.5 82.2   MCH 26.2 Low     26.3 Low     26.0 Low     26.4 Low     -- 26.7 Low     26.9 Low     27.4 26.5   MCHC 31.1 31.6 31.5 31.9 -- 32.4 32.4 32.8 32.2   RDW 12.7 12.7 12.8 12.8 -- 12.6 12.7 12.7 14.0   Platelets -- -- -- 967 -- 381 -- 314 316   MPV -- -- -- 9.9 -- 9.7 -- 9.7 8.6   Hemoglobin 11.8 12.0            Assessment:       Diagnosis Orders   1.  Epistaxis                Plan:      Left sided epistaxis. S/P surgery. Reviewed Dr. Samanta Villavicencio operative report. Reviewed labs from [facility]  Total time spent with the patient reviewing charts, lab tests, images, outside medical visits, history, examination, answering  questions and formulating treatment plan was 31 minutes. New 2 - 15 Return 2 - 10  New 3 - 30 Return 3 - 20  New 4 - 45 Return 4 - 30  New 5 - 60 Return 5 - 40+     Follow up Thursday for debridement.          Aysha Sky MD

## 2023-02-02 ENCOUNTER — OFFICE VISIT (OUTPATIENT)
Dept: ENT CLINIC | Age: 63
End: 2023-02-02
Payer: COMMERCIAL

## 2023-02-02 VITALS — HEIGHT: 65 IN | BODY MASS INDEX: 27.49 KG/M2 | WEIGHT: 165 LBS

## 2023-02-02 DIAGNOSIS — R09.81 NASAL CONGESTION: ICD-10-CM

## 2023-02-02 DIAGNOSIS — R04.0 EPISTAXIS: Primary | ICD-10-CM

## 2023-02-02 PROCEDURE — 31237 NSL/SINS NDSC SURG BX POLYPC: CPT | Performed by: OTOLARYNGOLOGY

## 2023-02-02 PROCEDURE — 99999 PR OFFICE/OUTPT VISIT,PROCEDURE ONLY: CPT | Performed by: OTOLARYNGOLOGY

## 2023-02-02 NOTE — PROGRESS NOTES
Patient here s/p FESS for epistaxis for post operative debridement, left. Left nasal cavity obstructed. PE: Left nasal cavity edema and mucus obstruction. Nasal Endoscopy with Debridement  Pre Op Dx: Nasal congestion, post operative sinus surgery  Post Op Dx: Same  Procedure: Nasal endoscopy with debridement left   Anesthesia: 2% lidocaine with afrin tiopical  EBL: None  Specimens: None  Findings: the left side nasal cavity and sinus crusting and mucus. Procedure:    After the application of afrin and pontocaine the nasal sinus cavity was debrided utilizing a zero degree carter endoscope with Kerrison rongeurs and daniel suctions on the left side. The sinus lining was healing well. No evidence of bleeding or rhinorrhea was noted. Tolerated well. Complications: None    A/P: Sinus rinses and sprays. Follow up in 3 weeks.

## 2023-02-08 ENCOUNTER — HOSPITAL ENCOUNTER (EMERGENCY)
Age: 63
Discharge: HOME | End: 2023-02-08
Attending: EMERGENCY MEDICINE
Payer: COMMERCIAL

## 2023-02-08 VITALS
SYSTOLIC BLOOD PRESSURE: 165 MMHG | TEMPERATURE: 97.7 F | BODY MASS INDEX: 26.73 KG/M2 | RESPIRATION RATE: 18 BRPM | OXYGEN SATURATION: 99 % | HEART RATE: 93 BPM | DIASTOLIC BLOOD PRESSURE: 91 MMHG | HEIGHT: 65 IN | WEIGHT: 160.4 LBS

## 2023-02-08 DIAGNOSIS — R04.0 EPISTAXIS: Primary | ICD-10-CM

## 2023-02-08 PROCEDURE — 6370000000 HC RX 637 (ALT 250 FOR IP): Performed by: EMERGENCY MEDICINE

## 2023-02-08 PROCEDURE — 99282 EMERGENCY DEPT VISIT SF MDM: CPT

## 2023-02-08 PROCEDURE — 31231 NASAL ENDOSCOPY DX: CPT | Performed by: OTOLARYNGOLOGY

## 2023-02-08 PROCEDURE — 99283 EMERGENCY DEPT VISIT LOW MDM: CPT | Performed by: OTOLARYNGOLOGY

## 2023-02-08 RX ORDER — OXYMETAZOLINE HYDROCHLORIDE 0.05 G/100ML
2 SPRAY NASAL ONCE
Status: COMPLETED | OUTPATIENT
Start: 2023-02-08 | End: 2023-02-08

## 2023-02-08 RX ADMIN — OXYMETAZOLINE HCL 2 SPRAY: 0.05 SPRAY NASAL at 21:36

## 2023-02-08 ASSESSMENT — PAIN - FUNCTIONAL ASSESSMENT: PAIN_FUNCTIONAL_ASSESSMENT: NONE - DENIES PAIN

## 2023-02-09 NOTE — ED TRIAGE NOTES
Patient arrived in the ER with c/o nose bleed. Patient states that two-week ago she had surgery to fix her nose bleed. However, today she sneeze and felt blood running down nose and throat. She called her ENT doctor and they told her come to the ER.

## 2023-02-09 NOTE — DISCHARGE INSTRUCTIONS
You were seen in the emergency department by ENT for evaluation of bleeding. The scope that they performed showed no active bleeding. They recommend that you continue using Afrin. You are noted to have an elevated blood pressure here in the emergency department. I encourage you to monitor this at home as persistently high blood pressures could result in recurrence of your bleeding. Please follow-up with your primary care for further management.

## 2023-02-09 NOTE — CONSULTS
Sahil      Patient Name: [facility] Record Number:  [id number]  Primary Care Physician:  Hadley Belle MD  Date of Consultation: 2023    Chief Complaint: Epistaxis    HISTORY OF PRESENT ILLNESS  Michelle Merritt is a(n) 63 y.o. female who presents to [facility] ED with recurrent epistaxis. 2 weeks ago, underwent left sphenopalatine artery cauterization at [facility].  Followed up with Dr. Rojas and [place] office. Postoperatively has been doing well. This evening, the patient called me with complaints of new onset nasal bleed after sneezing. Instructed her to use Afrin nasal spray in the left nostril. 30 minutes later, the patient continued to bleed and I instructed her to come to the emergency department. By the time she has arrived to the emergency department, the bleeding had essentially subsided. Patient is very concerned regarding potential for repeat significant bleed.       Patient Active Problem List   Diagnosis    Vitamin D deficiency    Neoplasm of uncertain behavior of skin    Pituitary adenoma ([address]    Hemifacial spasm    Solar lentigo    Personal history of malignant neoplasm of ovary    Irritable bowel syndrome    Anxiety    Female infertility    Arthritis    Atypical nevi    Actinic keratosis    Psoriasis    Unspecified polyarthropathy or polyarthritis, site unspecified    Hearing loss    Essential hypertension    Mixed hyperlipidemia    Seasonal allergic rhinitis due to pollen    Other constipation    Restless legs syndrome (RLS)    Psoriatic arthritis ([address]    Vasomotor rhinitis     Past Surgical History:   Procedure Laterality Date    BRAIN SURGERY      for blood vessel abnormality    BREAST REDUCTION SURGERY       SECTION      COLONOSCOPY      FINGER SURGERY  2007    Left index finger     HYSTERECTOMY (CERVIX STATUS UNKNOWN)      INNER EAR SURGERY  2008    repair artery right ear    NEUROMA SURGERY      microvascular reconstruction to fix hemifacial spasm    RHINOPLASTY      SHOULDER SURGERY      left shoulder    AMRITA AND BSO (CERVIX REMOVED)      TOE SURGERY      TONSILLECTOMY      VARICOSE VEIN SURGERY      WRIST SURGERY      right wrist     Family History   Problem Relation Age of Onset    Heart Disease Father         chf    High Blood Pressure Father     High Cholesterol Father     Diabetes Father     Kidney Disease Father     High Blood Pressure Brother     Depression Brother     Diabetes Brother     Cancer Paternal Grandmother     Stroke Paternal Grandmother     Stroke Paternal Grandfather     Cancer Mother         skin    Cancer Sister         breast     Social History     Socioeconomic History    Marital status:      Spouse name: Not on file    Number of children: Not on file    Years of education: Not on file    Highest education level: Not on file   Occupational History    Not on file   Tobacco Use    Smoking status: Never    Smokeless tobacco: Never   Substance and Sexual Activity    Alcohol use: Yes     Comment: occ    Drug use: No    Sexual activity: Yes   Other Topics Concern    Not on file   Social History Narrative    Not on file     Social Determinants of Health     Financial Resource Strain: Low Risk     Difficulty of Paying Living Expenses: Not hard at all   Food Insecurity: No Food Insecurity    Worried About Running Out of Food in the Last Year: Never true    Ran Out of Food in the Last Year: Never true   Transportation Needs: Not on file   Physical Activity: Not on file   Stress: Not on file   Social Connections: Not on file   Intimate Partner Violence: Not on file   Housing Stability: Not on file       DRUG/FOOD ALLERGIES: Sulfa antibiotics and Codeine    CURRENT MEDICATIONS  Prior to Admission medications    Medication Sig Start Date End Date Taking?  Authorizing Provider   cyclobenzaprine (FLEXERIL) 5 MG tablet Take 1-2 tabs three times daily as needed for muscle spasms 1/19/23   Rain Nova MD   secukinumab (COSENTYX SENSOREADY PEN) 150 MG/ML SOAJ Inject 2 pen Q 28 days/ 1/5/23   Lisandra Reagan MD   hydroCHLOROthiazide (MICROZIDE) 12.5 MG capsule Take 1 capsule by mouth daily 11/4/22   Rain Nova MD   clonazePAM (KLONOPIN) 0.5 MG tablet Take 1 tablet by mouth daily as needed for Anxiety for up to 90 days. 10/20/22 1/18/23  Rain Nova MD   WYNZORA 0.005-0.064 % CREA Apply daily for psoriasis for up to 8 weeks. 5/10/22   Mike Mejia MD   tretinoin (RETIN-A) 0.05 % cream Apply a pea sized amount to the face QHS 4/25/22   Mike Mejia MD   lisinopril (PRINIVIL;ZESTRIL) 20 MG tablet TAKE 1 TABLET DAILY 4/18/22   Rain Nova MD   meloxicam (MOBIC) 15 MG tablet TAKE 1 TABLET DAILY 4/18/22   Rain Nova MD   Calcipotriene (SORILUX) 0.005 % FOAM APPLY EXTERNALLY TO THE SCALP DAILY FOR PSORIASIS 2/23/22   Mike Mejia MD   atorvastatin (LIPITOR) 20 MG tablet TAKE 1 TABLET DAILY 2/10/22   Rain Nova MD   fluocinolone (DERMA-SMOOTHE/FS SCALP) 0.01 % external oil Apply topically at bedtime with cap as directed. Wash in the morning. 11/8/21   Mike Mejia MD   clobetasol (TEMOVATE) 0.05 % external solution Apply topically 2 times daily. 11/8/21   Katy Espinal MD   CLOBEX SPRAY 0.05 % LIQD Apply to scalp daily - bid prn flares. 11/8/21   Mike Mejia MD   linaCLOtide Otelia Hark) 72 MCG CAPS capsule Take 1 capsule by mouth every morning (before breakfast) 3/4/21   Sherri Weller MD   zinc sulfate (ZINCATE) 220 (50 Zn) MG capsule Take 50 mg by mouth daily    Historical Provider, MD   Estradiol (VAGIFEM) 10 MCG TABS vaginal tablet INSERT 1 TABLET VAGINALLY TWICE WEEKLY 7/5/18   Historical Provider, MD   fluticasone (FLONASE) 50 MCG/ACT nasal spray 1 spray by Nasal route daily    Historical Provider, MD   Ascorbic Acid (VITAMIN C) 500 MG tablet Take 1,000 mg by mouth daily.     Historical Provider, MD   Cholecalciferol (VITAMIN D) 1000 UNIT CAPS Take 1 capsule by mouth daily. 2/28/11   Historical Provider, MD   Flaxseed, Linseed, (FLAXSEED OIL) 1000 MG CAPS Take  by mouth. Historical Provider, MD   Lyubov Nilo Tea 250 MG CAPS Take  by mouth daily. 6/28/10   Historical Provider, MD   PROBIOTIC CAPS Take  by mouth daily. 6/28/10   Historical Provider, MD       REVIEW OF SYSTEMS  The following systems were reviewed and revealed the following in addition to any already discussed in the HPI:    CONSTITUTIONAL: denies weight loss, no fever, no night sweats, no chills  EYES: no vision changes, no blurry vision  EARS: no changes in hearing, no otalgia  NOSE: Positive left-sided epistaxis, no rhinorrhea  RESPIRATORY: no difficulty breathing, no shortness of breath  CV: no chest pain, no palpitations  HEME: No coagulation disorder, no bleeding disorder  NEURO: no TIA or stroke-like symptoms  SKIN: No new rashes in the head and neck, no recent skin cancers  MOUTH: No new ulcers, no recent teeth infections  GASTROINTESTINAL: No diarrhea, stomach pain  PSYCH: No anxiety, no depression      PHYSICAL EXAM  BP (!) 165/97   Pulse 93   Temp 97.7 °F (36.5 °C) (Oral)   Resp 18   Ht 5' 5\" (1.651 m)   Wt 160 lb 6.4 oz (72.8 kg)   LMP  (LMP Unknown)   SpO2 100%   BMI 26.69 kg/m²     GENERAL: No Acute Distress, Alert and Oriented, no hoarseness  EYES: EOMI, Anti-icteric  NOSE: No epistaxis, nasal mucosa within normal limits, no purulent drainage  EARS: Normal external canal appearance, EAC patent bilaterally  FACE: 1/6 House-Brackmann Scale, symmetric, sensation equal bilaterally  ORAL CAVITY: No masses or lesions palpated, uvula is midline, moist mucous membranes, scant amount of blood-tinged mucus in the oropharynx.   NECK: Normal range of motion, no thyromegaly, trachea is midline, no lymphadenopathy, no neck masses, no crepitus  CHEST: Normal respiratory effort, no retractions, breathing comfortably  SKIN: No rashes, normal appearing skin, no evidence of skin lesions/tumors    RADIOLOGY  Summary of findings:      PROCEDURE  Rigid Nasal Endoscopy    Preop: Epistaxis  Anes: topical lidocaine with afrin  Consent: verbal  Description:  After obtaining verbal consent from the patient 4% lidocaine with afrin was sprayed into the nasal cavities. After allowing a time for anesthesia, a nasal endoscope was placed into the naris. The septum, inferior, and middle turbinates were examined. The middle meatus, and sphenoethmoid recess was examined bilaterally. Cauterization site at left sphenopalatine artery well-healed without any evidence of active bleeding, clot or crusting. Mild amount of of thick drainage due to granulation and healing. Small amount of fresh blood on the left inferior turbinate with some crusting. No active bleeding noted. Tolerated well without complication. ASSESSMENT/PLAN  Yamilka Ferrari is a very pleasant 63 y.o. female with recurrent left-sided epistaxis status post left sphenopalatine artery cauterization 2 weeks ago. Nasal endoscopy today reveals the bleed was likely from the left inferior turbinate. I suspect she had a scab in this area secondary to nasal trauma from the packing from 2 weeks ago. This likely broke free during her sneeze causing minor bleed this evening. Afrin controlled the bleed. Patient may be discharged home safely. Recommend Afrin nasal spray 2 sprays each nostril every 12 hours for the next 3 days. If bleeding recurs she should call our office. I recommend she keep her current follow-up with Dr. Yoko Be in 2 weeks. Medical Decision Making:   The following items were considered in medical decision making:  Independent review of images  Review / order clinical lab tests  Review / order radiology tests  Decision to obtain old records

## 2023-02-09 NOTE — ED NOTES
Pt discharged from ED in stable condition. Discharge instruction explain, all question answered. Pt walked to lobby independently.        Mac Kelley RN  02/08/23 7507

## 2023-02-09 NOTE — ED PROVIDER NOTES
[facility]          EM ATTENDING NOTE       Date of evaluation: 2023    Chief Complaint     Epistaxis      History of Present Illness     Stu Haq is a 63 y.o. female who presents ***    Review of Systems     Please see HPI for pertinent positive and negatives. A complete review of systems was conducted and is otherwise negative unless noted. Past Medical, Surgical, Family, and Social History     She has a past medical history of Actinic keratosis, Allergic rhinitis, Anxiety, Cancer ([facility] [address] Chronic idiopathic constipation, Hemifacial spasm, Hyperlipidemia, Osteoarthritis, Psoriasis, and Psoriatic arthritis ([facility] [address] She has a past surgical history that includes  section; Hysterectomy; rhinoplasty; Breast reduction surgery; Toe Surgery; shoulder surgery; Wrist surgery; Neuroma surgery; Finger surgery (); Inner ear surgery (); Tonsillectomy; brain surgery; Colonoscopy (); Varicose vein surgery; and Total abdominal hysterectomy w/ bilateral salpingoophorectomy. Her family history includes Cancer in her mother, paternal grandmother, and sister; Depression in her brother; Diabetes in her brother and father; Heart Disease in her father; High Blood Pressure in her brother and father; High Cholesterol in her father; Kidney Disease in her father; Stroke in her paternal grandfather and paternal grandmother. She reports that she has never smoked. She has never used smokeless tobacco. She reports current alcohol use. She reports that she does not use drugs. Medications     Previous Medications    ASCORBIC ACID (VITAMIN C) 500 MG TABLET    Take 1,000 mg by mouth daily. ATORVASTATIN (LIPITOR) 20 MG TABLET    TAKE 1 TABLET DAILY    CALCIPOTRIENE (SORILUX) 0.005 % FOAM    APPLY EXTERNALLY TO THE SCALP DAILY FOR PSORIASIS    CHOLECALCIFEROL (VITAMIN D) 1000 UNIT CAPS    Take 1 capsule by mouth daily.     CLOBETASOL (TEMOVATE) 0.05 % EXTERNAL SOLUTION Apply topically 2 times daily. CLOBEX SPRAY 0.05 % LIQD    Apply to scalp daily - bid prn flares. CLONAZEPAM (KLONOPIN) 0.5 MG TABLET    Take 1 tablet by mouth daily as needed for Anxiety for up to 90 days. CYCLOBENZAPRINE (FLEXERIL) 5 MG TABLET    Take 1-2 tabs three times daily as needed for muscle spasms    ESTRADIOL (VAGIFEM) 10 MCG TABS VAGINAL TABLET    INSERT 1 TABLET VAGINALLY TWICE WEEKLY    FLAXSEED, LINSEED, (FLAXSEED OIL) 1000 MG CAPS    Take  by mouth. FLUOCINOLONE (DERMA-SMOOTHE/FS SCALP) 0.01 % EXTERNAL OIL    Apply topically at bedtime with cap as directed. Wash in the morning. FLUTICASONE (FLONASE) 50 MCG/ACT NASAL SPRAY    1 spray by Nasal route daily    GREEN  MG CAPS    Take  by mouth daily. HYDROCHLOROTHIAZIDE (MICROZIDE) 12.5 MG CAPSULE    Take 1 capsule by mouth daily    LINACLOTIDE (LINZESS) 72 MCG CAPS CAPSULE    Take 1 capsule by mouth every morning (before breakfast)    LISINOPRIL (PRINIVIL;ZESTRIL) 20 MG TABLET    TAKE 1 TABLET DAILY    MELOXICAM (MOBIC) 15 MG TABLET    TAKE 1 TABLET DAILY    PROBIOTIC CAPS    Take  by mouth daily. SECUKINUMAB (COSENTYX SENSOREADY PEN) 150 MG/ML SOAJ    Inject 2 pen Q 28 days/    TRETINOIN (RETIN-A) 0.05 % CREAM    Apply a pea sized amount to the face QHS    WYNZORA 0.005-0.064 % CREA    Apply daily for psoriasis for up to 8 weeks. ZINC SULFATE (ZINCATE) 220 (50 ZN) MG CAPSULE    Take 50 mg by mouth daily       Allergies     She is allergic to sulfa antibiotics and codeine.     Physical Exam     INITIAL VITALS: BP: (!) 165/97, Temp: 97.7 °F (36.5 °C), Heart Rate: 93, Resp: 18, SpO2: 100 %   Physical Exam    General:  Well developed *** in no acute distress, able toconverse in full sentences  HEENT:  Normocephalic, atraumatic, PERRL, extra-ocular movements intact, oropharynx benign  Neck:  Supple, no lymphadenopathy, trachea midline, no masses  Pulmonary:   Clear to auscultation bilaterally, good air movement, no wheezes  Cardiac:  Regular rate and rhythm, no M/R/G  Abdomen:  Soft, non-tender, non-distended, no rebounding or guarding  Musculoskeletal:  2+ pulses, no edema or clubbing  Skin:  No concerning rashes or lesions, no cyanosis  Neuro: Alert and oriented X 4,able to move all four extremities with equal strength bilaterally, sensory exam grossly intact, cranial nerves II-XII intact  Psych:  Appropriate mood and affect    Diagnostic Results     EKG   ***    RADIOLOGY:  No orders to display       LABS:   No results found for this visit on 02/08/23. ED BEDSIDE ULTRASOUND:  ***    RECENT VITALS:  BP: (!) 165/97, Temp: 97.7 °F (36.5 °C), Heart Rate: 93, Resp: 18, SpO2: 100 %     Procedures     ***    ED Course     Nursing Notes, Past Medical Hx, Past Surgical Hx, Social Hx, Allergies, and Family Hx were reviewed. The patient was given the following medications:  Orders Placed This Encounter   Medications    oxymetazoline (AFRIN) 0.05 % nasal spray 2 spray       CONSULTS:  None    MEDICAL DECISION MAKING / ASSESSMENT / PLAN     Corie Victor is a 63 y.o. female with a history andpresentation as described above in HPI. The patient was evaluated by myself, the ED Attending Physician. On initial encounter the patient was in no acute distress with reassuring vitals and no signs of impending hemodynamic or respiratory collapse. ***      Patient was treated with below medications:  Medications   oxymetazoline (AFRIN) 0.05 % nasal spray 2 spray (2 sprays Each Nostril Given by Other 2/8/23 2136)       ***At this time the patient has been deemed safe for discharge. Verbal discharge instructionsincluding strict return precautions for worsening or new symptoms have been communicated. The patient was advised to follow up with ***. The patient was prescribed ***. I discussed the medications with the patient. I gave them signs and symptoms that could indicate an adverse reaction.  I have advised them to limit their activities until they can see how they respond to medication. ***At this time the patient has been admitted to ***.    *** At this time I am going off-service and will be signing out care of this patient to my colleague Dr. Bradford Galindo for further care. My colleague's responsibilities will include: ***    Medical Decision Making  Risk  OTC drugs. Clinical Impression     No diagnosis found. Disposition     PATIENT REFERREDTO:  No follow-up provider specified.     DISCHARGE MEDICATIONS:  New Prescriptions    No medications on file       DISPOSITION The patient was advised to follow up with ENT. Medical Decision Making  Risk  OTC drugs. Clinical Impression     1. Epistaxis        Disposition     PATIENT REFERREDTO:  No follow-up provider specified.     DISCHARGE MEDICATIONS:  Discharge Medication List as of 2/8/2023 10:11 PM          DISPOSITION Decision To Discharge 02/08/2023 09:53:48 PM          Dayanara Martinez MD  02/14/23 [id number]

## 2023-02-10 ENCOUNTER — TELEMEDICINE (OUTPATIENT)
Dept: INTERNAL MEDICINE CLINIC | Age: 63
End: 2023-02-10
Payer: COMMERCIAL

## 2023-02-10 DIAGNOSIS — R04.0 EPISTAXIS: ICD-10-CM

## 2023-02-10 DIAGNOSIS — F41.9 ANXIETY: ICD-10-CM

## 2023-02-10 DIAGNOSIS — I10 ESSENTIAL HYPERTENSION: Primary | ICD-10-CM

## 2023-02-10 DIAGNOSIS — G25.81 RESTLESS LEGS SYNDROME (RLS): ICD-10-CM

## 2023-02-10 PROCEDURE — 99214 OFFICE O/P EST MOD 30 MIN: CPT | Performed by: INTERNAL MEDICINE

## 2023-02-10 RX ORDER — LISINOPRIL 40 MG/1
TABLET ORAL
Qty: 90 TABLET | Refills: 1 | Status: SHIPPED | OUTPATIENT
Start: 2023-02-10

## 2023-02-10 RX ORDER — HYDROCHLOROTHIAZIDE 25 MG/1
25 TABLET ORAL EVERY MORNING
Qty: 90 TABLET | Refills: 1 | Status: SHIPPED | OUTPATIENT
Start: 2023-02-10

## 2023-02-10 RX ORDER — CLONAZEPAM 0.5 MG/1
0.5 TABLET ORAL DAILY PRN
Qty: 90 TABLET | Refills: 0 | Status: SHIPPED | OUTPATIENT
Start: 2023-02-10 | End: 2023-05-11

## 2023-02-10 SDOH — ECONOMIC STABILITY: FOOD INSECURITY: WITHIN THE PAST 12 MONTHS, YOU WORRIED THAT YOUR FOOD WOULD RUN OUT BEFORE YOU GOT MONEY TO BUY MORE.: NEVER TRUE

## 2023-02-10 SDOH — ECONOMIC STABILITY: HOUSING INSECURITY
IN THE LAST 12 MONTHS, WAS THERE A TIME WHEN YOU DID NOT HAVE A STEADY PLACE TO SLEEP OR SLEPT IN A SHELTER (INCLUDING NOW)?: NO

## 2023-02-10 SDOH — ECONOMIC STABILITY: FOOD INSECURITY: WITHIN THE PAST 12 MONTHS, THE FOOD YOU BOUGHT JUST DIDN'T LAST AND YOU DIDN'T HAVE MONEY TO GET MORE.: NEVER TRUE

## 2023-02-10 SDOH — ECONOMIC STABILITY: INCOME INSECURITY: HOW HARD IS IT FOR YOU TO PAY FOR THE VERY BASICS LIKE FOOD, HOUSING, MEDICAL CARE, AND HEATING?: NOT HARD AT ALL

## 2023-02-10 ASSESSMENT — PATIENT HEALTH QUESTIONNAIRE - PHQ9
SUM OF ALL RESPONSES TO PHQ QUESTIONS 1-9: 0
2. FEELING DOWN, DEPRESSED OR HOPELESS: 0
SUM OF ALL RESPONSES TO PHQ QUESTIONS 1-9: 0
SUM OF ALL RESPONSES TO PHQ9 QUESTIONS 1 & 2: 0
SUM OF ALL RESPONSES TO PHQ QUESTIONS 1-9: 0
1. LITTLE INTEREST OR PLEASURE IN DOING THINGS: 0
SUM OF ALL RESPONSES TO PHQ QUESTIONS 1-9: 0

## 2023-02-10 NOTE — PROGRESS NOTES
Yaron Flor (:  1960) is a Established patient, here for evaluation of the following:    Assessment & Plan   Below is the assessment and plan developed based on review of pertinent history, physical exam, labs, studies, and medications. 1. Essential hypertension  -Increase lisinopril to 40 mg daily, HCTZ to 25 mg daily  -     lisinopril (PRINIVIL;ZESTRIL) 40 MG tablet; TAKE 1 TABLET DAILY, Disp-90 tablet, R-1Normal  2. Epistaxis   -She will be following up with her ENT  3. Anxiety  -Increased after the stress of the hospitalization. Continue clonazepam at night for anxiety and RLS. If still having nighttime awakenings could consider adding something else but she will try working on this  -     clonazePAM (KLONOPIN) 0.5 MG tablet; Take 1 tablet by mouth daily as needed for Anxiety for up to 90 days. , Disp-90 tablet, R-0Normal  4. Restless legs syndrome (RLS)  -     clonazePAM (KLONOPIN) 0.5 MG tablet; Take 1 tablet by mouth daily as needed for Anxiety for up to 90 days. , Disp-90 tablet, R-0Normal  Return in about 3 months (around 5/10/2023) for hypertension, restless legs. Subjective   HPI    Her blood pressure has been running higher. She is not sure whether it was running high prior to the nosebleeds but it has since then. She notes that she does not feel as well when the blood pressure is running really high. She still taking lisinopril and hydrochlorothiazide. Anxiety is running high after the hospitalization. She is waking up at night feeling anxious, not sure if she is having nightmares or panic attacks. She will be scheduling a closer follow-up with ENT. She is on Afrin for 3 days, tomorrow is last day. Review of Systems       Objective   Patient-Reported Vitals  Patient-Reported Systolic (Top): 419 mmHg  Patient-Reported Diastolic (Bottom): 91 mmHg  Patient-Reported Weight: 160 lbs  Patient-Reported Height: 5 5       Physical Exam  Vitals reviewed.    Constitutional: General: She is not in acute distress. Appearance: Normal appearance. Pulmonary:      Effort: Pulmonary effort is normal. No respiratory distress. Neurological:      General: No focal deficit present. Mental Status: She is alert. Psychiatric:         Mood and Affect: Mood is anxious. Speech: Speech normal.         Behavior: Behavior normal.         Thought Content: Thought content normal.         Cognition and Memory: Cognition and memory normal.                Sterling Cabrera, was evaluated through a synchronous (real-time) audio-video encounter. The patient (or guardian if applicable) is aware that this is a billable service, which includes applicable co-pays. This Virtual Visit was conducted with patient's (and/or legal guardian's) consent. The visit was conducted pursuant to the emergency declaration under the [facility], [address] [place] [address] waiver authority and the [employer] General Act. Patient identification was verified, and a caregiver was present when appropriate. The patient was located at Home: [address] [place] [address] Dr Mihai Dixon 21  Provider was located [phone] Michael Ahmadi [address] [place] [address]         --Dave Rockwell MD

## 2023-02-13 ENCOUNTER — OFFICE VISIT (OUTPATIENT)
Dept: ENT CLINIC | Age: 63
End: 2023-02-13
Payer: COMMERCIAL

## 2023-02-13 VITALS
WEIGHT: 157 LBS | SYSTOLIC BLOOD PRESSURE: 123 MMHG | BODY MASS INDEX: 26.16 KG/M2 | TEMPERATURE: 97.2 F | HEART RATE: 81 BPM | HEIGHT: 65 IN | DIASTOLIC BLOOD PRESSURE: 86 MMHG

## 2023-02-13 DIAGNOSIS — R04.0 EPISTAXIS: Primary | ICD-10-CM

## 2023-02-13 PROCEDURE — 3079F DIAST BP 80-89 MM HG: CPT | Performed by: OTOLARYNGOLOGY

## 2023-02-13 PROCEDURE — 99212 OFFICE O/P EST SF 10 MIN: CPT | Performed by: OTOLARYNGOLOGY

## 2023-02-13 PROCEDURE — 3074F SYST BP LT 130 MM HG: CPT | Performed by: OTOLARYNGOLOGY

## 2023-02-13 ASSESSMENT — ENCOUNTER SYMPTOMS
SHORTNESS OF BREATH: 0
TROUBLE SWALLOWING: 0
EYE ITCHING: 0
SINUS PRESSURE: 0
DIARRHEA: 0
SINUS PAIN: 0
COUGH: 0
RHINORRHEA: 0
EYE REDNESS: 0
CHOKING: 0
FACIAL SWELLING: 0
VOICE CHANGE: 0
EYE PAIN: 0
NAUSEA: 0
SORE THROAT: 0

## 2023-02-13 NOTE — PROGRESS NOTES
Subjective:      Patient ID: Sherri Hayden is a 63 y.o. female. HPI  Chief Complaint   Patient presents with    epistaxis  History of Present Isaiah Manzano is a(n) 63 y.o. female who presents with follow up epistaxis. Had surgery at [facility]. Seen in ER last week with small bleed resolved with afrin. Stopped rinses. Using saline sprays. Blood pressure medications increased.      Patient Active Problem List   Diagnosis    Vitamin D deficiency    Neoplasm of uncertain behavior of skin    Pituitary adenoma ([address]    Hemifacial spasm    Solar lentigo    Personal history of malignant neoplasm of ovary    Irritable bowel syndrome    Anxiety    Female infertility    Arthritis    Atypical nevi    Actinic keratosis    Psoriasis    Unspecified polyarthropathy or polyarthritis, site unspecified    Hearing loss    Essential hypertension    Mixed hyperlipidemia    Seasonal allergic rhinitis due to pollen    Other constipation    Restless legs syndrome (RLS)    Psoriatic arthritis (HCC)    Vasomotor rhinitis    Epistaxis     Past Surgical History:   Procedure Laterality Date    BRAIN SURGERY      for blood vessel abnormality    BREAST REDUCTION SURGERY       SECTION      COLONOSCOPY      FINGER SURGERY  2007    Left index finger     HYSTERECTOMY (CERVIX STATUS UNKNOWN)      INNER EAR SURGERY      repair artery right ear    NEUROMA SURGERY      microvascular reconstruction to fix hemifacial spasm    RHINOPLASTY      SHOULDER SURGERY      left shoulder    AMRITA AND BSO (CERVIX REMOVED)      TOE SURGERY      TONSILLECTOMY      VARICOSE VEIN SURGERY      WRIST SURGERY      right wrist     Family History   Problem Relation Age of Onset    Heart Disease Father         chf    High Blood Pressure Father     High Cholesterol Father     Diabetes Father     Kidney Disease Father     High Blood Pressure Brother     Depression Brother     Diabetes Brother     Cancer Paternal Grandmother     Stroke Paternal Grandmother Stroke Paternal Grandfather     Cancer Mother         skin    Cancer Sister         breast     Social History     Socioeconomic History    Marital status:      Spouse name: Not on file    Number of children: Not on file    Years of education: Not on file    Highest education level: Not on file   Occupational History    Not on file   Tobacco Use    Smoking status: Never    Smokeless tobacco: Never   Substance and Sexual Activity    Alcohol use: Yes     Comment: occ    Drug use: No    Sexual activity: Yes   Other Topics Concern    Not on file   Social History Narrative    Not on file     Social Determinants of Health     Financial Resource Strain: Low Risk     Difficulty of Paying Living Expenses: Not hard at all   Food Insecurity: No Food Insecurity    Worried About Running Out of Food in the Last Year: Never true    [address] in the Last Year: Never true   Transportation Needs: Unknown    Lack of Transportation (Medical): Not on file    Lack of Transportation (Non-Medical): No   Physical Activity: Not on file   Stress: Not on file   Social Connections: Not on file   Intimate Partner Violence: Not on file   Housing Stability: Unknown    Unable to Pay for Housing in the Last Year: Not on file    Number of Places Lived in the Last Year: Not on file    Unstable Housing in the Last Year: No       DRUG/FOOD ALLERGIES: Sulfa antibiotics and Codeine    CURRENT MEDICATIONS  Prior to Admission medications    Medication Sig Start Date End Date Taking? Authorizing Provider   lisinopril (PRINIVIL;ZESTRIL) 40 MG tablet TAKE 1 TABLET DAILY 2/10/23  Yes Bernard Amado MD   hydroCHLOROthiazide (HYDRODIURIL) 25 MG tablet Take 1 tablet by mouth every morning 2/10/23  Yes Bernard Amado MD   clonazePAM (KLONOPIN) 0.5 MG tablet Take 1 tablet by mouth daily as needed for Anxiety for up to 90 days.  2/10/23 5/11/23 Yes Bernard Amado MD   secukinumab (COSENTYX SENSOREADY PEN) 150 MG/ML SOAJ Inject 2 pen Q 28 days/ 1/5/23  Yes Daniella Lyle Rubi Avila MD   [facility] 0.005-0.064 % CREA Apply daily for psoriasis for up to 8 weeks. 5/10/22  Yes Venus Arevalo MD   tretinoin (RETIN-A) 0.05 % cream Apply a pea sized amount to the face QHS 4/25/22  Yes Glorine Osgood Moosbrugger, MD   meloxicam (MOBIC) 15 MG tablet TAKE 1 TABLET DAILY 4/18/22  Yes Beatrix Gosselin, MD   Calcipotriene ([facility]) 0.005 % FOAM APPLY EXTERNALLY TO THE SCALP DAILY FOR PSORIASIS 2/23/22  Yes Venus Arevalo MD   atorvastatin (LIPITOR) 20 MG tablet TAKE 1 TABLET DAILY 2/10/22  Yes Beatrix Gosselin, MD   fluocinolone (DERMA-SMOOTHE/FS SCALP) 0.01 % external oil Apply topically at bedtime with cap as directed. Wash in the morning. 11/8/21  Yes Venus Arevalo MD   clobetasol (TEMOVATE) 0.05 % external solution Apply topically 2 times daily. 11/8/21  Yes Venus Arevalo MD   CLOBEX SPRAY 0.05 % LIQD Apply to scalp daily - bid prn flares. 11/8/21  Yes Venus Arevalo MD   linaCLOtide Linzie Benne) 72 MCG CAPS capsule Take 1 capsule by mouth every morning (before breakfast) 3/4/21  Yes Josselin Long MD   zinc sulfate (ZINCATE) 220 (50 Zn) MG capsule Take 50 mg by mouth daily   Yes Historical Provider, MD   Estradiol (VAGIFEM) 10 MCG TABS vaginal tablet INSERT 1 TABLET VAGINALLY TWICE WEEKLY 7/5/18  Yes Historical Provider, MD   fluticasone (FLONASE) 50 MCG/ACT nasal spray 1 spray by Nasal route daily   Yes Historical Provider, MD   Ascorbic Acid (VITAMIN C) 500 MG tablet Take 1,000 mg by mouth daily. Yes Historical Provider, MD   Cholecalciferol (VITAMIN D) 1000 UNIT CAPS Take 1 capsule by mouth daily. 2/28/11  Yes Historical Provider, MD   Flaxseed, Linseed, (FLAXSEED OIL) 1000 MG CAPS Take  by mouth. Yes Historical Provider, MD [employer] Tea 250 MG CAPS Take  by mouth daily. 6/28/10  Yes Historical Provider, MD   PROBIOTIC CAPS Take  by mouth daily.  6/28/10  Yes Historical Provider, MD         Lab Studies:  Lab Results   Component Value Date    WBC 5.2 11/14/2022    HGB 14.1 11/14/2022    HCT 43.8 11/14/2022    MCV 81.7 11/14/2022     11/14/2022     Lab Results   Component Value Date    GLUCOSE 99 11/14/2022    BUN 21 (H) 11/14/2022    CREATININE 0.9 11/30/2022    K 4.2 11/14/2022     11/14/2022     11/14/2022    CALCIUM 9.7 11/14/2022     No results found for: MG  Lab Results   Component Value Date    PHOS 4.3 06/17/2015     Lab Results   Component Value Date    ALKPHOS 109 11/14/2022    ALT 24 11/14/2022    AST 21 11/14/2022    BILITOT 0.3 11/14/2022    PROT 6.3 (L) 11/14/2022      Review of Systems   Constitutional:  Negative for activity change, appetite change, chills, fatigue and fever. HENT:  Negative for congestion, ear discharge, ear pain, facial swelling, hearing loss, nosebleeds, postnasal drip, rhinorrhea, sinus pressure, sinus pain, sneezing, sore throat, tinnitus, trouble swallowing and voice change. Eyes:  Negative for pain, redness, itching and visual disturbance. Respiratory:  Negative for cough, choking and shortness of breath. Gastrointestinal:  Negative for diarrhea and nausea. Endocrine: Negative for cold intolerance and heat intolerance. Objective:   Physical Exam  Constitutional:       Appearance: She is well-developed. HENT:      Head: Not macrocephalic and not microcephalic. No Escalera's sign, abrasion, right periorbital erythema, left periorbital erythema or laceration. Nose: No nasal deformity, septal deviation, laceration, mucosal edema or rhinorrhea. Right Nostril: No epistaxis or occlusion. Left Nostril: No epistaxis or occlusion. Right Turbinates: Not enlarged, swollen or pale. Left Turbinates: Not enlarged, swollen or pale. Right Sinus: No maxillary sinus tenderness or frontal sinus tenderness. Left Sinus: No maxillary sinus tenderness or frontal sinus tenderness. Mouth/Throat:      Lips: No lesions. Mouth: Mucous membranes are moist.      Tongue: No lesions. Palate: No mass. Pharynx: Uvula midline. No oropharyngeal exudate or posterior oropharyngeal erythema. Tonsils: No tonsillar abscesses. Eyes:      General:         Right eye: No discharge. Left eye: No discharge. Extraocular Movements:      Right eye: Normal extraocular motion. Left eye: Normal extraocular motion. Conjunctiva/sclera:      Right eye: Right conjunctiva is not injected. No chemosis or exudate. Left eye: Left conjunctiva is not injected. No chemosis or exudate. Neck:      Thyroid: No thyroid mass or thyromegaly. Cardiovascular:      Rate and Rhythm: Normal rate and regular rhythm. Pulmonary:      Effort: No tachypnea or respiratory distress. Lymphadenopathy:      Head:      Right side of head: No preauricular or posterior auricular adenopathy. Left side of head: No preauricular or posterior auricular adenopathy. Cervical:      Right cervical: No superficial, deep or posterior cervical adenopathy. Left cervical: No superficial, deep or posterior cervical adenopathy. Neurological:      Mental Status: She is alert and oriented to person, place, and time. Assessment:       Diagnosis Orders   1. Epistaxis                Plan:      Saline sprays only for one week. Start rinses again next week. Follow up in 4 weeks.          Luis Haley MD

## 2023-02-20 DIAGNOSIS — E78.2 MIXED HYPERLIPIDEMIA: ICD-10-CM

## 2023-02-20 RX ORDER — ATORVASTATIN CALCIUM 20 MG/1
TABLET, FILM COATED ORAL
Qty: 90 TABLET | Refills: 3 | Status: SHIPPED | OUTPATIENT
Start: 2023-02-20

## 2023-03-01 ENCOUNTER — OFFICE VISIT (OUTPATIENT)
Dept: RHEUMATOLOGY | Age: 63
End: 2023-03-01
Payer: COMMERCIAL

## 2023-03-01 VITALS
DIASTOLIC BLOOD PRESSURE: 78 MMHG | SYSTOLIC BLOOD PRESSURE: 120 MMHG | WEIGHT: 157 LBS | HEIGHT: 65 IN | BODY MASS INDEX: 26.16 KG/M2

## 2023-03-01 DIAGNOSIS — L40.50 PSORIATIC ARTHRITIS (HCC): Primary | ICD-10-CM

## 2023-03-01 DIAGNOSIS — Z79.899 HIGH RISK MEDICATION USE: ICD-10-CM

## 2023-03-01 DIAGNOSIS — L40.9 PSORIASIS: ICD-10-CM

## 2023-03-01 DIAGNOSIS — M15.9 PRIMARY OSTEOARTHRITIS INVOLVING MULTIPLE JOINTS: ICD-10-CM

## 2023-03-01 PROCEDURE — 3078F DIAST BP <80 MM HG: CPT | Performed by: INTERNAL MEDICINE

## 2023-03-01 PROCEDURE — 3074F SYST BP LT 130 MM HG: CPT | Performed by: INTERNAL MEDICINE

## 2023-03-01 PROCEDURE — 99214 OFFICE O/P EST MOD 30 MIN: CPT | Performed by: INTERNAL MEDICINE

## 2023-03-01 RX ORDER — SECUKINUMAB 150 MG/ML
INJECTION SUBCUTANEOUS
Qty: 2 ADJUSTABLE DOSE PRE-FILLED PEN SYRINGE | Refills: 5 | Status: SHIPPED | OUTPATIENT
Start: 2023-03-01

## 2023-03-01 NOTE — PROGRESS NOTES
[address] Yang MD                                                                                                 (L) [phone] (F)      Primary provider: Willi Yañez MD  Patient identification: Sheryle Bjornstad: 1960,Sex: female         ASSESSMENT/PLAN:    Renny Johnson was seen today for follow-up. Diagnoses and all orders for this visit:    Psoriatic arthritis ([facility] [address]    Psoriasis    High risk medication use    Primary osteoarthritis involving multiple joints    Other orders  -     secukinumab (COSENTYX SENSOREADY PEN) 150 MG/ML SOAJ; Inject 2 pen Q 28 days/      Ovarian cancer 2003- mucinous adenocarcinoma, surgery, Chemo- cancer free- follows up with oncologist.  Onset at the age of 24- started with Dactylitis, then progressed to wide spread inflammatory arthritis. Started with Medrol, Sulfasalazine, Enbrel-early 2000's. A/P Today's visit-  1. Psoriasis and psoriatic arthritis-  Started Cosentyx(September 2022)-psoriasis and psoriatic arthritis -asymptomatic on 300 mg of Cosentyx every 4 weekly. Continue current regimen. Labs are stable. Prior medications-   Enbrel since early 2000's-2022-lost efficacy    3. 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 RTC 3 months. Patient indicates understanding and agrees with the management plan. I reviewed patients medical information and medical history in the medical records. Note is transcribed using voice recognition software. Inadvertent computerized transcription errors may be present. ##################################################################    Subjective: Follow-up for psoriasis, psoriatic arthritis and osteoarthritis. Interval changes-  She is pleased on Cosentyx, has done wonders for psoriasis and psoriatic arthritis. Denies any musculoskeletal discomfort. Osteoarthritis stable as well. Overall happy on current regimen. Recently hospitalized at [facility] because of nosebleed that was cauterized. No side effects from medications. No bowel symptoms. She does not have history of inflammatory bowel diseases. All other review of systems are negative. Past medical, surgical history, social history allergies Meds reviewed. Current Outpatient Medications   Medication Sig Dispense Refill    secukinumab (COSENTYX SENSOREADY PEN) 150 MG/ML SOAJ Inject 2 pen Q 28 days/ 2 Adjustable Dose Pre-filled Pen Syringe 5    atorvastatin (LIPITOR) 20 MG tablet TAKE 1 TABLET DAILY 90 tablet 3    lisinopril (PRINIVIL;ZESTRIL) 40 MG tablet TAKE 1 TABLET DAILY 90 tablet 1    hydroCHLOROthiazide (HYDRODIURIL) 25 MG tablet Take 1 tablet by mouth every morning 90 tablet 1    clonazePAM (KLONOPIN) 0.5 MG tablet Take 1 tablet by mouth daily as needed for Anxiety for up to 90 days. 90 tablet 0    WYNZORA 0.005-0.064 % CREA Apply daily for psoriasis for up to 8 weeks. 60 g 1    tretinoin (RETIN-A) 0.05 % cream Apply a pea sized amount to the face QHS 45 g 5    meloxicam (MOBIC) 15 MG tablet TAKE 1 TABLET DAILY 90 tablet 3    Calcipotriene (SORILUX) 0.005 % FOAM APPLY EXTERNALLY TO THE SCALP DAILY FOR PSORIASIS 120 g 5    fluocinolone (DERMA-SMOOTHE/FS SCALP) 0.01 % external oil Apply topically at bedtime with cap as directed. Wash in the morning. 120 mL 5    clobetasol (TEMOVATE) 0.05 % external solution Apply topically 2 times daily. 50 mL 5    CLOBEX SPRAY 0.05 % LIQD Apply to scalp daily - bid prn flares.  250 mL 3    linaCLOtide (LINZESS) 72 MCG CAPS capsule Take 1 capsule by mouth every morning (before breakfast) 90 capsule 3    zinc sulfate (ZINCATE) 220 (50 Zn) MG capsule Take 50 mg by mouth daily      Estradiol (VAGIFEM) 10 MCG TABS vaginal tablet INSERT 1 TABLET VAGINALLY TWICE WEEKLY      fluticasone (FLONASE) 50 MCG/ACT nasal spray 1 spray by Nasal route daily      Ascorbic Acid (VITAMIN C) 500 MG tablet Take 1,000 mg by mouth daily. Cholecalciferol (VITAMIN D) 1000 UNIT CAPS Take 1 capsule by mouth daily. Flaxseed, Linseed, (FLAXSEED OIL) 1000 MG CAPS Take  by mouth. Green Tea 250 MG CAPS Take  by mouth daily. PROBIOTIC CAPS Take  by mouth daily. No current facility-administered medications for this visit. Allergies   Allergen Reactions    Sulfa Antibiotics Rash    Codeine          OBJECTIVE  Physical    Vitals:    03/01/23 0759   BP: 120/78         General appearance/psychiatric: Well nourished and well groomed, normal judgment. Alert, appears stated age and cooperative. MKS:     28 joint JOINT COUNT:28 joint count 0                               Right                                                  Left   Swell Tender Swell Tender   PIP1           PIP2           PIP3           PIP4          PIP5          MCP1           MCP2           MCP3           MCP4           MCP5           Wrist           Elbow           Shoulder          Knee           No inflammatory musculoskeletal findings in exam    Other than OA changes-no appreciable tender, swollen or inflamed joints in upper or lower extremities, F ROM in all peripheral joints. Small Heberden's nodules in her both index finger DIPs and long finger-right. Subtle OA changes across her CMC's, PIPs. Normal gait. No dactylitis or enthesitis. Skin: No psoriasis at this time.     Data:  Lab Results   Component Value Date/Time    WBC 5.2 11/14/2022 07:49 AM    RBC 5.36 11/14/2022 07:49 AM    HGB 14.1 11/14/2022 07:49 AM    HCT 43.8 11/14/2022 07:49 AM     11/14/2022 07:49 AM    MCV 81.7 11/14/2022 07:49 AM    MCH 26.3 11/14/2022 07:49 AM    MCHC 32.2 11/14/2022 07:49 AM    RDW 13.2 11/14/2022 07:49 AM    SEGSPCT 55.3 03/15/2013 07:11 AM    LYMPHOPCT 29.5 11/14/2022 07:49 AM    MONOPCT 8.0 11/14/2022 07:49 AM    BASOPCT 0.7 11/14/2022 07:49 AM    MONOSABS 0.4 11/14/2022 07:49 AM    LYMPHSABS 1.5 11/14/2022 07:49 AM    EOSABS 0.1 11/14/2022 07:49 AM    BASOSABS 0.0 11/14/2022 07:49 AM    DIFFTYPE Auto-K 03/15/2013 07:11 AM       Chemistry        Component Value Date/Time     11/14/2022 0749    K 4.2 11/14/2022 0749     11/14/2022 0749    CO2 26 11/14/2022 0749    BUN 21 (H) 11/14/2022 0749    CREATININE 0.9 11/30/2022 0739        Component Value Date/Time    CALCIUM 9.7 11/14/2022 0749    ALKPHOS 109 11/14/2022 0749    AST 21 11/14/2022 0749    ALT 24 11/14/2022 0749    BILITOT 0.3 11/14/2022 0749          Total time 32 minutes- reviewing medical records & pre charting on the same day of visit,  history taking, exam, explaining medical diagnosis, management plan, ordering labs, filling medications, communicating findings with other providers and medical documentation in EHR.          Theo Parham MD 03/01/23

## 2023-03-13 ENCOUNTER — OFFICE VISIT (OUTPATIENT)
Dept: ENT CLINIC | Age: 63
End: 2023-03-13
Payer: COMMERCIAL

## 2023-03-13 VITALS
WEIGHT: 160 LBS | DIASTOLIC BLOOD PRESSURE: 81 MMHG | BODY MASS INDEX: 26.66 KG/M2 | SYSTOLIC BLOOD PRESSURE: 127 MMHG | HEART RATE: 76 BPM | OXYGEN SATURATION: 95 % | TEMPERATURE: 96.9 F | HEIGHT: 65 IN

## 2023-03-13 DIAGNOSIS — R04.0 EPISTAXIS: Primary | ICD-10-CM

## 2023-03-13 DIAGNOSIS — J32.0 CHRONIC MAXILLARY SINUSITIS: ICD-10-CM

## 2023-03-13 PROCEDURE — 31237 NSL/SINS NDSC SURG BX POLYPC: CPT | Performed by: OTOLARYNGOLOGY

## 2023-03-13 NOTE — PROGRESS NOTES
Patient here for follow up epistaxis. Surgery at [facility]. 1-26. Dr. Lenin Avila. Left SPA ligation. Doing well. States more nasal. Had infection since last visit. PE: Open nasal cavity. Thick mucus middle meatus. Debrided. Procedure:    Nasal Endoscopy with Debridement  Pre Op Dx: Nasal congestion, post operative sinus surgery  Post Op Dx: Same  Procedure: Nasal endoscopy with debridement left  Anesthesia: 2% lidocaine with afrin tiopical  EBL: None  Specimens: None  Findings: the left side nasal cavity and sinus crusting and mucus. Procedure:    After the application of afrin and pontocaine the nasal sinus cavity was debrided utilizing a zero degree carter endoscope with Kerrison rongeurs and daniel suctions on the left side. The sinus lining was healing well. Concern for recirculation as natural os not identified but mucoid build up in maxillary sinus. Suctioned. No evidence of bleeding or rhinorrhea was noted. Tolerated well. Complications: None        A/P: Flonase daily. Sinus rinses as needed. Follow up in one month.

## 2023-03-15 ENCOUNTER — PATIENT MESSAGE (OUTPATIENT)
Dept: INTERNAL MEDICINE CLINIC | Age: 63
End: 2023-03-15

## 2023-03-15 NOTE — TELEPHONE ENCOUNTER
From: Pollo Jim  To: Dr. Gama Abt: 3/15/2023 8:39 AM EDT  Subject: Kleber Sim    My mail pharmacy raised the price of this to $250 so I have a discount card I can use at the local pharmacy. Can you call this into [facility] on [address] [employer] so I can try the discount card? Thanks!   Doris Mackay

## 2023-03-18 ENCOUNTER — PATIENT MESSAGE (OUTPATIENT)
Dept: INTERNAL MEDICINE CLINIC | Age: 63
End: 2023-03-18

## 2023-03-20 RX ORDER — MELOXICAM 15 MG/1
TABLET ORAL
Qty: 90 TABLET | Refills: 1 | Status: SHIPPED | OUTPATIENT
Start: 2023-03-20

## 2023-03-20 NOTE — TELEPHONE ENCOUNTER
From: Mel Monson  To: Dr. Meehan Cristobal: 3/18/2023 9:57 AM EDT  Subject: Danae Cullen:    Can you submit a prescription for Meloxicam 15 mg to [facility], 90 days, for me? I am out of refills.     Thank you,  Mandi Hunt

## 2023-04-17 ENCOUNTER — TELEPHONE (OUTPATIENT)
Dept: INTERNAL MEDICINE CLINIC | Age: 63
End: 2023-04-17

## 2023-04-17 NOTE — TELEPHONE ENCOUNTER
Spoke to pt offered appt she declined stated she is unable to do it. Is there any other day you could do. Monday the 24th she is pretty open.

## 2023-04-17 NOTE — TELEPHONE ENCOUNTER
Can squeeze her in the end of the day on Monday  It's the only day I'm in the office next week so wouldn't be able to do it any later unless someone else is available

## 2023-04-17 NOTE — TELEPHONE ENCOUNTER
----- Message from Abhishek Sesay sent at 4/14/2023  4:51 PM EDT -----  Subject: Appointment Request    Reason for Call: Established Patient Appointment needed: Routine Pre-Op    QUESTIONS    Reason for appointment request? Available appointments did not meet   patient need     Additional Information for Provider? pt is requesting sooner appt for pre   op appt, states that she has surgery on the 28th of april and needs to be   seen sooner than june, would rather due video visit  ---------------------------------------------------------------------------  --------------  Lisa Gillette [facility]  [phone]; OK to leave message on voicemail  ---------------------------------------------------------------------------  --------------  SCRIPT MANJU DAWN Screen: Saida Haq

## 2023-04-18 ENCOUNTER — HOSPITAL ENCOUNTER (OUTPATIENT)
Dept: CT IMAGING | Age: 63
Discharge: HOME | End: 2023-04-18
Payer: COMMERCIAL

## 2023-04-18 DIAGNOSIS — J32.0 CHRONIC MAXILLARY SINUSITIS: ICD-10-CM

## 2023-04-18 DIAGNOSIS — J01.00 SUBACUTE MAXILLARY SINUSITIS: ICD-10-CM

## 2023-04-18 PROCEDURE — 70486 CT MAXILLOFACIAL W/O DYE: CPT

## 2023-04-24 ENCOUNTER — OFFICE VISIT (OUTPATIENT)
Dept: INTERNAL MEDICINE CLINIC | Age: 63
End: 2023-04-24
Payer: COMMERCIAL

## 2023-04-24 VITALS
SYSTOLIC BLOOD PRESSURE: 122 MMHG | BODY MASS INDEX: 26.82 KG/M2 | HEIGHT: 65 IN | WEIGHT: 161 LBS | DIASTOLIC BLOOD PRESSURE: 70 MMHG

## 2023-04-24 DIAGNOSIS — I10 ESSENTIAL HYPERTENSION: ICD-10-CM

## 2023-04-24 DIAGNOSIS — L40.50 PSORIATIC ARTHRITIS (HCC): ICD-10-CM

## 2023-04-24 DIAGNOSIS — R04.0 EPISTAXIS: ICD-10-CM

## 2023-04-24 DIAGNOSIS — Z01.818 PREOP EXAMINATION: Primary | ICD-10-CM

## 2023-04-24 PROCEDURE — 93000 ELECTROCARDIOGRAM COMPLETE: CPT | Performed by: INTERNAL MEDICINE

## 2023-04-24 PROCEDURE — 3078F DIAST BP <80 MM HG: CPT | Performed by: INTERNAL MEDICINE

## 2023-04-24 PROCEDURE — 99214 OFFICE O/P EST MOD 30 MIN: CPT | Performed by: INTERNAL MEDICINE

## 2023-04-24 PROCEDURE — 3074F SYST BP LT 130 MM HG: CPT | Performed by: INTERNAL MEDICINE

## 2023-04-24 NOTE — PROGRESS NOTES
Lungs are preoperative Consultation      Beatrice Sneed  YOB: 1960    Date of Service:  4/24/2023    Vitals:    04/24/23 1546   BP: 122/70   Site: Left Upper Arm   Weight: 161 lb (73 kg)   Height: 5' 5\" (1.651 m)      Wt Readings from Last 2 Encounters:   04/24/23 161 lb (73 kg)   04/13/23 159 lb 3.2 oz (72.2 kg)     BP Readings from Last 3 Encounters:   04/24/23 122/70   04/13/23 122/81   03/13/23 127/81        Chief Complaint   Patient presents with    Pre-op Exam     Sinus, Dr. Taylor Watters, 4/28/23     Allergies   Allergen Reactions    Sulfa Antibiotics Rash    Codeine      VOMITING    Percocet [Oxycodone-Acetaminophen] Nausea And Vomiting     Outpatient Medications Marked as Taking for the 4/24/23 encounter (Office Visit) with Bessy Hahn MD   Medication Sig Dispense Refill    MAGNESIUM-ZINC PO Take by mouth at bedtime      Coenzyme Q10 10 MG CAPS Take by mouth      meloxicam (MOBIC) 15 MG tablet TAKE 1 TABLET DAILY 90 tablet 1    linaCLOtide (LINZESS) 72 MCG CAPS capsule Take 1 capsule by mouth every morning (before breakfast) (Patient taking differently: Take 1 capsule by mouth as needed) 90 capsule 0    lisinopril (PRINIVIL;ZESTRIL) 20 MG tablet Take 1 tablet by mouth daily (Patient taking differently: Take 1 tablet by mouth at bedtime) 90 tablet 1    secukinumab (COSENTYX SENSOREADY PEN) 150 MG/ML SOAJ Inject 2 pen Q 28 days/ (Patient taking differently: Inject 2 pen Q 28 days/300 MG) 2 Adjustable Dose Pre-filled Pen Syringe 5    atorvastatin (LIPITOR) 20 MG tablet TAKE 1 TABLET DAILY 90 tablet 3    hydroCHLOROthiazide (HYDRODIURIL) 25 MG tablet Take 1 tablet by mouth every morning 90 tablet 1    clonazePAM (KLONOPIN) 0.5 MG tablet Take 1 tablet by mouth daily as needed for Anxiety for up to 90 days. (Patient taking differently: Take 1 tablet by mouth daily as needed for Anxiety.  PT TAKING DAILY AT HS) 90 tablet 0    tretinoin (RETIN-A) 0.05 % cream Apply a pea sized amount to the face

## 2023-04-27 ENCOUNTER — ANESTHESIA EVENT (OUTPATIENT)
Dept: OPERATING ROOM | Age: 63
End: 2023-04-27
Payer: COMMERCIAL

## 2023-04-28 ENCOUNTER — ANESTHESIA (OUTPATIENT)
Dept: OPERATING ROOM | Age: 63
End: 2023-04-28
Payer: COMMERCIAL

## 2023-04-28 ENCOUNTER — HOSPITAL ENCOUNTER (OUTPATIENT)
Age: 63
Setting detail: OUTPATIENT SURGERY
Discharge: HOME | End: 2023-04-28
Attending: OTOLARYNGOLOGY | Admitting: OTOLARYNGOLOGY
Payer: COMMERCIAL

## 2023-04-28 VITALS
TEMPERATURE: 97 F | HEIGHT: 65 IN | WEIGHT: 154 LBS | HEART RATE: 84 BPM | BODY MASS INDEX: 25.66 KG/M2 | DIASTOLIC BLOOD PRESSURE: 72 MMHG | OXYGEN SATURATION: 95 % | RESPIRATION RATE: 12 BRPM | SYSTOLIC BLOOD PRESSURE: 138 MMHG

## 2023-04-28 DIAGNOSIS — J32.0 CHRONIC MAXILLARY SINUSITIS: ICD-10-CM

## 2023-04-28 DIAGNOSIS — G89.18 POST-OP PAIN: Primary | ICD-10-CM

## 2023-04-28 PROCEDURE — 88305 TISSUE EXAM BY PATHOLOGIST: CPT

## 2023-04-28 PROCEDURE — 2720000010 HC SURG SUPPLY STERILE: Performed by: OTOLARYNGOLOGY

## 2023-04-28 PROCEDURE — 31256 EXPLORATION MAXILLARY SINUS: CPT | Performed by: OTOLARYNGOLOGY

## 2023-04-28 PROCEDURE — 6370000000 HC RX 637 (ALT 250 FOR IP): Performed by: OTOLARYNGOLOGY

## 2023-04-28 PROCEDURE — 6360000002 HC RX W HCPCS: Performed by: ANESTHESIOLOGY

## 2023-04-28 PROCEDURE — 2709999900 HC NON-CHARGEABLE SUPPLY: Performed by: OTOLARYNGOLOGY

## 2023-04-28 PROCEDURE — 2580000003 HC RX 258: Performed by: OTOLARYNGOLOGY

## 2023-04-28 PROCEDURE — 3600000014 HC SURGERY LEVEL 4 ADDTL 15MIN: Performed by: OTOLARYNGOLOGY

## 2023-04-28 PROCEDURE — 7100000000 HC PACU RECOVERY - FIRST 15 MIN: Performed by: OTOLARYNGOLOGY

## 2023-04-28 PROCEDURE — 3700000000 HC ANESTHESIA ATTENDED CARE: Performed by: OTOLARYNGOLOGY

## 2023-04-28 PROCEDURE — 7100000001 HC PACU RECOVERY - ADDTL 15 MIN: Performed by: OTOLARYNGOLOGY

## 2023-04-28 PROCEDURE — 2500000003 HC RX 250 WO HCPCS: Performed by: NURSE ANESTHETIST, CERTIFIED REGISTERED

## 2023-04-28 PROCEDURE — 3600000004 HC SURGERY LEVEL 4 BASE: Performed by: OTOLARYNGOLOGY

## 2023-04-28 PROCEDURE — 6360000002 HC RX W HCPCS: Performed by: NURSE ANESTHETIST, CERTIFIED REGISTERED

## 2023-04-28 PROCEDURE — 2580000003 HC RX 258: Performed by: FAMILY MEDICINE

## 2023-04-28 PROCEDURE — 2500000003 HC RX 250 WO HCPCS: Performed by: OTOLARYNGOLOGY

## 2023-04-28 PROCEDURE — 3700000001 HC ADD 15 MINUTES (ANESTHESIA): Performed by: OTOLARYNGOLOGY

## 2023-04-28 PROCEDURE — 6370000000 HC RX 637 (ALT 250 FOR IP): Performed by: ANESTHESIOLOGY

## 2023-04-28 PROCEDURE — 7100000011 HC PHASE II RECOVERY - ADDTL 15 MIN: Performed by: OTOLARYNGOLOGY

## 2023-04-28 PROCEDURE — 7100000010 HC PHASE II RECOVERY - FIRST 15 MIN: Performed by: OTOLARYNGOLOGY

## 2023-04-28 PROCEDURE — 31254 NSL/SINS NDSC W/PRTL ETHMDCT: CPT | Performed by: OTOLARYNGOLOGY

## 2023-04-28 PROCEDURE — A4217 STERILE WATER/SALINE, 500 ML: HCPCS | Performed by: OTOLARYNGOLOGY

## 2023-04-28 RX ORDER — LABETALOL HYDROCHLORIDE 5 MG/ML
10 INJECTION, SOLUTION INTRAVENOUS
Status: DISCONTINUED | OUTPATIENT
Start: 2023-04-28 | End: 2023-04-28 | Stop reason: HOSPADM

## 2023-04-28 RX ORDER — SCOLOPAMINE TRANSDERMAL SYSTEM 1 MG/1
1 PATCH, EXTENDED RELEASE TRANSDERMAL ONCE
Status: DISCONTINUED | OUTPATIENT
Start: 2023-04-28 | End: 2023-04-28 | Stop reason: HOSPADM

## 2023-04-28 RX ORDER — DEXAMETHASONE SODIUM PHOSPHATE 4 MG/ML
INJECTION, SOLUTION INTRA-ARTICULAR; INTRALESIONAL; INTRAMUSCULAR; INTRAVENOUS; SOFT TISSUE PRN
Status: DISCONTINUED | OUTPATIENT
Start: 2023-04-28 | End: 2023-04-28 | Stop reason: SDUPTHER

## 2023-04-28 RX ORDER — MAGNESIUM HYDROXIDE 1200 MG/15ML
LIQUID ORAL CONTINUOUS PRN
Status: DISCONTINUED | OUTPATIENT
Start: 2023-04-28 | End: 2023-04-28 | Stop reason: HOSPADM

## 2023-04-28 RX ORDER — PROCHLORPERAZINE EDISYLATE 5 MG/ML
5 INJECTION INTRAMUSCULAR; INTRAVENOUS
Status: DISCONTINUED | OUTPATIENT
Start: 2023-04-28 | End: 2023-04-28 | Stop reason: HOSPADM

## 2023-04-28 RX ORDER — ONDANSETRON 2 MG/ML
INJECTION INTRAMUSCULAR; INTRAVENOUS PRN
Status: DISCONTINUED | OUTPATIENT
Start: 2023-04-28 | End: 2023-04-28 | Stop reason: SDUPTHER

## 2023-04-28 RX ORDER — MEPERIDINE HYDROCHLORIDE 25 MG/ML
12.5 INJECTION INTRAMUSCULAR; INTRAVENOUS; SUBCUTANEOUS EVERY 5 MIN PRN
Status: DISCONTINUED | OUTPATIENT
Start: 2023-04-28 | End: 2023-04-28 | Stop reason: HOSPADM

## 2023-04-28 RX ORDER — HYDROCODONE BITARTRATE AND ACETAMINOPHEN 5; 325 MG/1; MG/1
1 TABLET ORAL EVERY 6 HOURS PRN
Qty: 15 TABLET | Refills: 0 | Status: SHIPPED | OUTPATIENT
Start: 2023-04-28 | End: 2023-05-02

## 2023-04-28 RX ORDER — HYDRALAZINE HYDROCHLORIDE 20 MG/ML
10 INJECTION INTRAMUSCULAR; INTRAVENOUS
Status: DISCONTINUED | OUTPATIENT
Start: 2023-04-28 | End: 2023-04-28 | Stop reason: HOSPADM

## 2023-04-28 RX ORDER — SODIUM CHLORIDE, SODIUM LACTATE, POTASSIUM CHLORIDE, CALCIUM CHLORIDE 600; 310; 30; 20 MG/100ML; MG/100ML; MG/100ML; MG/100ML
INJECTION, SOLUTION INTRAVENOUS CONTINUOUS
Status: DISCONTINUED | OUTPATIENT
Start: 2023-04-28 | End: 2023-04-28 | Stop reason: HOSPADM

## 2023-04-28 RX ORDER — HYDROCODONE BITARTRATE AND ACETAMINOPHEN 5; 325 MG/1; MG/1
1 TABLET ORAL EVERY 6 HOURS PRN
Status: DISCONTINUED | OUTPATIENT
Start: 2023-04-28 | End: 2023-04-28 | Stop reason: HOSPADM

## 2023-04-28 RX ORDER — SODIUM CHLORIDE 0.9 % (FLUSH) 0.9 %
5-40 SYRINGE (ML) INJECTION PRN
Status: DISCONTINUED | OUTPATIENT
Start: 2023-04-28 | End: 2023-04-28 | Stop reason: HOSPADM

## 2023-04-28 RX ORDER — MIDAZOLAM HYDROCHLORIDE 1 MG/ML
INJECTION INTRAMUSCULAR; INTRAVENOUS PRN
Status: DISCONTINUED | OUTPATIENT
Start: 2023-04-28 | End: 2023-04-28 | Stop reason: SDUPTHER

## 2023-04-28 RX ORDER — ONDANSETRON 2 MG/ML
4 INJECTION INTRAMUSCULAR; INTRAVENOUS
Status: COMPLETED | OUTPATIENT
Start: 2023-04-28 | End: 2023-04-28

## 2023-04-28 RX ORDER — EPINEPHRINE NASAL SOLUTION 1 MG/ML
SOLUTION NASAL PRN
Status: DISCONTINUED | OUTPATIENT
Start: 2023-04-28 | End: 2023-04-28 | Stop reason: HOSPADM

## 2023-04-28 RX ORDER — LIDOCAINE HYDROCHLORIDE 20 MG/ML
INJECTION, SOLUTION EPIDURAL; INFILTRATION; INTRACAUDAL; PERINEURAL PRN
Status: DISCONTINUED | OUTPATIENT
Start: 2023-04-28 | End: 2023-04-28 | Stop reason: SDUPTHER

## 2023-04-28 RX ORDER — APREPITANT 40 MG/1
40 CAPSULE ORAL ONCE
Status: COMPLETED | OUTPATIENT
Start: 2023-04-28 | End: 2023-04-28

## 2023-04-28 RX ORDER — SODIUM CHLORIDE 0.9 % (FLUSH) 0.9 %
5-40 SYRINGE (ML) INJECTION EVERY 12 HOURS SCHEDULED
Status: DISCONTINUED | OUTPATIENT
Start: 2023-04-28 | End: 2023-04-28 | Stop reason: HOSPADM

## 2023-04-28 RX ORDER — OXYCODONE HYDROCHLORIDE 5 MG/1
5 TABLET ORAL EVERY 6 HOURS PRN
Qty: 10 TABLET | Refills: 0 | Status: SHIPPED | OUTPATIENT
Start: 2023-04-28 | End: 2023-05-01

## 2023-04-28 RX ORDER — OXYMETAZOLINE HYDROCHLORIDE 0.05 G/100ML
SPRAY NASAL PRN
Status: DISCONTINUED | OUTPATIENT
Start: 2023-04-28 | End: 2023-04-28 | Stop reason: HOSPADM

## 2023-04-28 RX ORDER — LIDOCAINE HYDROCHLORIDE AND EPINEPHRINE 10; 10 MG/ML; UG/ML
INJECTION, SOLUTION INFILTRATION; PERINEURAL PRN
Status: DISCONTINUED | OUTPATIENT
Start: 2023-04-28 | End: 2023-04-28 | Stop reason: HOSPADM

## 2023-04-28 RX ORDER — FENTANYL CITRATE 50 UG/ML
INJECTION, SOLUTION INTRAMUSCULAR; INTRAVENOUS PRN
Status: DISCONTINUED | OUTPATIENT
Start: 2023-04-28 | End: 2023-04-28 | Stop reason: SDUPTHER

## 2023-04-28 RX ORDER — PROPOFOL 10 MG/ML
INJECTION, EMULSION INTRAVENOUS PRN
Status: DISCONTINUED | OUTPATIENT
Start: 2023-04-28 | End: 2023-04-28 | Stop reason: SDUPTHER

## 2023-04-28 RX ORDER — OXYCODONE HYDROCHLORIDE 5 MG/1
5 TABLET ORAL
Status: DISCONTINUED | OUTPATIENT
Start: 2023-04-28 | End: 2023-04-28 | Stop reason: SDUPTHER

## 2023-04-28 RX ORDER — ROCURONIUM BROMIDE 10 MG/ML
INJECTION, SOLUTION INTRAVENOUS PRN
Status: DISCONTINUED | OUTPATIENT
Start: 2023-04-28 | End: 2023-04-28 | Stop reason: SDUPTHER

## 2023-04-28 RX ORDER — SODIUM CHLORIDE 9 MG/ML
INJECTION, SOLUTION INTRAVENOUS PRN
Status: DISCONTINUED | OUTPATIENT
Start: 2023-04-28 | End: 2023-04-28 | Stop reason: HOSPADM

## 2023-04-28 RX ADMIN — LIDOCAINE HYDROCHLORIDE 100 MG: 20 INJECTION, SOLUTION EPIDURAL; INFILTRATION; INTRACAUDAL; PERINEURAL at 08:59

## 2023-04-28 RX ADMIN — ONDANSETRON 4 MG: 2 INJECTION INTRAMUSCULAR; INTRAVENOUS at 09:16

## 2023-04-28 RX ADMIN — DEXAMETHASONE SODIUM PHOSPHATE 8 MG: 4 INJECTION, SOLUTION INTRAMUSCULAR; INTRAVENOUS at 09:16

## 2023-04-28 RX ADMIN — MIDAZOLAM HYDROCHLORIDE 2 MG: 2 INJECTION, SOLUTION INTRAMUSCULAR; INTRAVENOUS at 08:50

## 2023-04-28 RX ADMIN — PROPOFOL 150 MG: 10 INJECTION, EMULSION INTRAVENOUS at 08:59

## 2023-04-28 RX ADMIN — APREPITANT 40 MG: 40 CAPSULE ORAL at 07:53

## 2023-04-28 RX ADMIN — ONDANSETRON 4 MG: 2 INJECTION INTRAMUSCULAR; INTRAVENOUS at 10:23

## 2023-04-28 RX ADMIN — SUGAMMADEX 200 MG: 100 INJECTION, SOLUTION INTRAVENOUS at 09:27

## 2023-04-28 RX ADMIN — FENTANYL CITRATE 50 MCG: 50 INJECTION, SOLUTION INTRAMUSCULAR; INTRAVENOUS at 09:40

## 2023-04-28 RX ADMIN — FENTANYL CITRATE 50 MCG: 50 INJECTION, SOLUTION INTRAMUSCULAR; INTRAVENOUS at 08:59

## 2023-04-28 RX ADMIN — ROCURONIUM BROMIDE 40 MG: 10 INJECTION INTRAVENOUS at 08:59

## 2023-04-28 RX ADMIN — HYDROMORPHONE HYDROCHLORIDE 0.5 MG: 1 INJECTION, SOLUTION INTRAMUSCULAR; INTRAVENOUS; SUBCUTANEOUS at 10:24

## 2023-04-28 RX ADMIN — PROPOFOL 100 MG: 10 INJECTION, EMULSION INTRAVENOUS at 09:07

## 2023-04-28 RX ADMIN — SODIUM CHLORIDE, POTASSIUM CHLORIDE, SODIUM LACTATE AND CALCIUM CHLORIDE: 600; 310; 30; 20 INJECTION, SOLUTION INTRAVENOUS at 07:43

## 2023-04-28 RX ADMIN — HYDROMORPHONE HYDROCHLORIDE 0.5 MG: 1 INJECTION, SOLUTION INTRAMUSCULAR; INTRAVENOUS; SUBCUTANEOUS at 09:59

## 2023-04-28 ASSESSMENT — PAIN SCALES - GENERAL
PAINLEVEL_OUTOF10: 4
PAINLEVEL_OUTOF10: 6
PAINLEVEL_OUTOF10: 2
PAINLEVEL_OUTOF10: 3
PAINLEVEL_OUTOF10: 6
PAINLEVEL_OUTOF10: 7
PAINLEVEL_OUTOF10: 0

## 2023-04-28 ASSESSMENT — PAIN DESCRIPTION - PAIN TYPE
TYPE: SURGICAL PAIN

## 2023-04-28 ASSESSMENT — PAIN DESCRIPTION - ORIENTATION
ORIENTATION: MID

## 2023-04-28 ASSESSMENT — PAIN - FUNCTIONAL ASSESSMENT
PAIN_FUNCTIONAL_ASSESSMENT: 0-10
PAIN_FUNCTIONAL_ASSESSMENT: ACTIVITIES ARE NOT PREVENTED
PAIN_FUNCTIONAL_ASSESSMENT: PREVENTS OR INTERFERES SOME ACTIVE ACTIVITIES AND ADLS

## 2023-04-28 ASSESSMENT — PAIN DESCRIPTION - DESCRIPTORS
DESCRIPTORS: BURNING
DESCRIPTORS: DISCOMFORT
DESCRIPTORS: BURNING
DESCRIPTORS: BURNING

## 2023-04-28 ASSESSMENT — PAIN DESCRIPTION - FREQUENCY
FREQUENCY: CONTINUOUS

## 2023-04-28 ASSESSMENT — PAIN DESCRIPTION - LOCATION
LOCATION: NOSE

## 2023-04-28 NOTE — ANESTHESIA POSTPROCEDURE EVALUATION
Department of Anesthesiology  Postprocedure Note    Patient: Jean Paul Bowden  MRN: [id number]  YOB: 1960  Date of evaluation: 4/28/2023      Procedure Summary     Date: 04/28/23 Room / Location: [address]    Anesthesia Start: 8650 Anesthesia Stop: 3058    Procedure: BILATERAL ANTERIOR ETHMOIDECTOMY AND BILATERAL MAXILLARY ANTROSTOMY (Bilateral) Diagnosis:       Chronic maxillary sinusitis      (Bilateral Chronic maxillary sinusitis)    Surgeons: David Reyes MD Responsible Provider: Richar Benjamin MD    Anesthesia Type: general ASA Status: 2          Anesthesia Type: No value filed.     Marylou Phase I: Marylou Score: 8    Marylou Phase II:        Anesthesia Post Evaluation    Patient location during evaluation: PACU  Patient participation: complete - patient participated  Level of consciousness: awake and alert  Airway patency: patent  Nausea & Vomiting: no nausea and no vomiting  Complications: no  Cardiovascular status: hemodynamically stable  Respiratory status: acceptable  Hydration status: euvolemic  Multimodal analgesia pain management approach

## 2023-04-28 NOTE — PROGRESS NOTES
Ambulatory Surgery/Procedure Discharge Note    Vitals:    04/28/23 1137   BP: 138/72   Pulse: 84   Resp: 12   Temp: 97 °F (36.1 °C)   SpO2: 95%     Pt meets discharge criteria per Marylou score. In: 1300 [I.V.:1300]  Out: 20     Restroom use offered before discharge. Yes    Pain assessment:  present - adequately treated  Pain Level: 2  Pt satisfied. Ice pack to nose. Pt and S.O./family states \"ready to go home\". Pt alert and oriented x4. IV removed. Denies N/V. Ice pack to nose. Discharge instructions given to pt and daughter with pt permission. Pt and daughter verbalized understanding of all instructions. Left with all belongings, 1 prescriptions, and discharge instructions. Patient discharged to home/self care.  Patient discharged via wheel chair by transporter to waiting family/S.O.       4/28/2023 12:09 PM

## 2023-04-28 NOTE — PROGRESS NOTES
PACU Transfer to [facility]    Vitals:    04/28/23 1126   BP: (!) 144/69   Pulse: 84   Resp: 11   Temp: 97 °F (36.1 °C)   SpO2: 94%   Within 20% of pre-op      Intake/Output Summary (Last 24 hours) at 4/28/2023 1129  Last data filed at 4/28/2023 1115  Gross per 24 hour   Intake 1300 ml   Output 20 ml   Net 1280 ml       Pain assessment:  present - adequately treated  Pain Level: 3    Patient transferred to care of RONIT RN.    4/28/2023 11:29 AM

## 2023-04-28 NOTE — DISCHARGE INSTRUCTIONS
Discharge Instructions for Functional Endoscopic Sinus Surgery    Functional endoscopic sinus surgery is a procedure to enlarge pathways to the sinuses. It is done to improve sinus drainage and may help to treat recurring sinus problems and infections. Recovery from this surgery takes about 1 weeks. Steps to [address] [facility]   While your sinuses are healing:   Do not blow your nose until your doctor says it is okay to do so. This is usually after 48 hours. You may have bloody discharge from your nose. Create a drip pad using rolls of gauze. Hold the roll under your nose to soak up any discharge. Avoid air pollutants like dust and smoke. Physical Activity   During your recovery:   Avoid swimming in oceans and lakes for 2 weeks. Get plenty of rest for at least a 2-3 days. If your job involves heavy lifting, you may need to stay out of work up to 1 week. Ask your doctor when you will be able to return to work. Do not drive unless your doctor has given you permission to do so. Medications   Your doctor may advise:   Over-the-counter pain medication. Saline spray to moisturize the nose and clear nasal crusting. Two sprays every two hours while awake. Afrin nasal spray. Two sprays each nostril three times a day for three days then stop. Follow these general medication guidelines:   Take your medication as directed. Do not change the amount or schedule. Tylenol 1000 mg (two 500 mg tablets) by mouth every 6 hours for pain. Ibuprofen (Advil or Motrin) 600 mg (three 200 mg tablets) by mouth every 6 hours for pain. Alternate these two medications every three hours. (Do NOT take the tylenol when taking Vicodin. Vicodin has tylenol in it.)  Vicodin/Norco, 5/325 mg tablets. Take one tablet every 6 hours as needed for pain. (Contains 325 mg Tylenol)  Ask what side effects could occur. Report them to your doctor. Talk to your doctor before you stop taking the medication.   Do not share your medication with

## 2023-04-28 NOTE — OP NOTE
performed with the 0 degree micro debrider. A 0 degree micro-debrider was used to remove the ethmoid bulla lateral to the lamina papyracea , superior to the skull base and posterior to the basal lamella. Surgery continued on the right nasal cavity. Using a zero degree endoscope and and a caudal elevator the middle turbinate was medialized in its inferior third. The uncinate was in fractured with a frontal sinus seeker. A Julieta's window was made with a back biting forceps. The uncinate was removed with a 4mm, 0 degree straight shot micro debrider in its entirety from the inferior turbinate to the skull base. The natural ostium of the maxillary sinus was identified and back-fractured through the fontanelle with a frontal sinus seeker. A large maxillary antrostomy was then performed with the 0 degree micro debrider. A 0 degree micro-debrider was used to remove the ethmoid bulla lateral to the lamina papyracea , superior to the skull base and posterior to the basal lamella. Pledgets were removed and the wounds were thoroughly irrigated with sterile saline. Nasopore was placed between the middle turbinates and lateral sinus wall on the bilateral. There was no further significant bleeding. The patient was then awoken from general anesthesia, extubated and sent to the PACU in stable condition. I attest that I was present for and performed the entire procedure myself.      FILIPPO     Electronically signed by Jeramie Brantley MD on 4/28/2023 at 9:33 AM

## 2023-04-28 NOTE — PROGRESS NOTES
Patient admitted to PACU # 15 from OR at 9552 post BILATERAL ANTERIOR ETHMOIDECTOMY AND BILATERAL MAXILLARY ANTROSTOMY  per Dr. Ke Herrmann. Attached to PACU monitoring system and report received from anesthesia provider. Patient was reported to be hemodynamically stable during procedure. Patient drowsy on admission and denied pain.

## 2023-04-28 NOTE — ANESTHESIA PRE PROCEDURE
Department of Anesthesiology  Preprocedure Note       Name:  Tala Webb   Age:  63 y.o.  :  1960                                          MRN:  [id number]         Date:  2023      Surgeon: Hamilton Samayoa):  Brant Gould MD    Procedure: Procedure(s):  BILATERAL ANTERIOR ETHMOIDECTOMY AND BILATERAL MAXILLARY ANTROSTOMY    Medications prior to admission:   Prior to Admission medications    Medication Sig Start Date End Date Taking? Authorizing Provider   MAGNESIUM-ZINC PO Take by mouth at bedtime   Yes Historical Provider, MD   Coenzyme Q10 10 MG CAPS Take by mouth   Yes Historical Provider, MD   meloxicam (MOBIC) 15 MG tablet TAKE 1 TABLET DAILY 3/20/23   Anne-Marie Gonzalez MD   linaCLOtide [facility]) 72 MCG CAPS capsule Take 1 capsule by mouth every morning (before breakfast)  Patient taking differently: Take 1 capsule by mouth as needed 3/15/23   Anne-Marie Gonzalez MD   lisinopril (PRINIVIL;ZESTRIL) 20 MG tablet Take 1 tablet by mouth daily  Patient taking differently: Take 1 tablet by mouth at bedtime 3/3/23   Anne-Marie Gonzalez MD   secukinumab (COSENTYX SENSOREADY PEN) 150 MG/ML SOAJ Inject 2 pen Q 28 days/  Patient taking differently: Inject 2 pen Q 28 days/300 MG 3/1/23   Bijal Doty MD   atorvastatin (LIPITOR) 20 MG tablet TAKE 1 TABLET DAILY 23   Anne-Marie Gonzalez MD   hydroCHLOROthiazide (HYDRODIURIL) 25 MG tablet Take 1 tablet by mouth every morning 2/10/23   Anne-Marie Gonzalez MD   clonazePAM (KLONOPIN) 0.5 MG tablet Take 1 tablet by mouth daily as needed for Anxiety for up to 90 days. Patient taking differently: Take 1 tablet by mouth daily as needed for Anxiety.  PT TAKING DAILY AT HS 2/10/23 5/11/23  Anne-Marie Gonzalez MD   tretinoin (RETIN-A) 0.05 % cream Apply a pea sized amount to the face QHS  Patient taking differently: Apply a pea sized amount to the face/PT TAKING TWICE WEEKLY 22   Nyasia Espinal MD   Calcipotriene (SORILUX) 0.005 % FOAM APPLY EXTERNALLY TO THE SCALP DAILY FOR

## 2023-04-28 NOTE — BRIEF OP NOTE
Brief Postoperative Note      Patient: James Vazquez  YOB: 1960  MRN: [id number]    Date of Procedure: 4/28/2023    Pre-Op Diagnosis Codes:     * Chronic maxillary sinusitis [J32.0]    Post-Op Diagnosis: Same       Procedure(s):  BILATERAL ANTERIOR ETHMOIDECTOMY AND BILATERAL MAXILLARY ANTROSTOMY    Surgeon(s):  Kimberlyn Andino MD    Assistant:  Surgical Assistant: Constance Olivares    Anesthesia: General    Estimated Blood Loss (mL): 25    Complications: None    Specimens:   ID Type Source Tests Collected by Time Destination   A : BILATERAL SINUS CONTENTS Tissue Tissue SURGICAL PATHOLOGY Kimberlyn Andino MD 4/28/2023 7675        Implants:  * No implants in log *      Drains: * No LDAs found *    Findings: Bilateral maxillary recirculation      Electronically signed by Phylicia Dow MD on 4/28/2023 at 9:30 AM

## 2023-04-28 NOTE — H&P
Patient ID: Jean Paul Bowden is a 63 y.o. female. HPI  Chief Complaint   Patient presents with    Congestion  History of Present Kiran Rios is a(n) 63 y.o. female who presents with continued left sided nasal congestion. Getting worse. Saw recirculation last visit. Bilateral maxillary. Using rinses and sprays.              Patient Active Problem List   Diagnosis    Vitamin D deficiency    Neoplasm of uncertain behavior of skin    Pituitary adenoma ([address]    Hemifacial spasm    Solar lentigo    Personal history of malignant neoplasm of ovary    Irritable bowel syndrome    Anxiety    Female infertility    Arthritis    Atypical nevi    Actinic keratosis    Psoriasis    Unspecified polyarthropathy or polyarthritis, site unspecified    Hearing loss    Essential hypertension    Mixed hyperlipidemia    Seasonal allergic rhinitis due to pollen    Other constipation    Restless legs syndrome (RLS)    Psoriatic arthritis (HCC)    Vasomotor rhinitis    Epistaxis      Past Surgical History         Past Surgical History:   Procedure Laterality Date    BRAIN SURGERY         for blood vessel abnormality    BREAST REDUCTION SURGERY         SECTION        COLONOSCOPY       FINGER SURGERY   2007     Left index finger     HYSTERECTOMY (CERVIX STATUS UNKNOWN)        INNER EAR SURGERY   2008     repair artery right ear    NEUROMA SURGERY         microvascular reconstruction to fix hemifacial spasm    RHINOPLASTY        SHOULDER SURGERY         left shoulder    AMRITA AND BSO (CERVIX REMOVED)        TOE SURGERY        TONSILLECTOMY        VARICOSE VEIN SURGERY        WRIST SURGERY         right wrist         Family History         Family History   Problem Relation Age of Onset    Heart Disease Father           chf    High Blood Pressure Father      High Cholesterol Father      Diabetes Father      Kidney Disease Father      High Blood Pressure Brother      Depression Brother      Diabetes Brother      Cancer Paternal

## 2023-05-04 ENCOUNTER — TELEPHONE (OUTPATIENT)
Dept: ENT CLINIC | Age: 63
End: 2023-05-04

## 2023-05-04 ENCOUNTER — HOSPITAL ENCOUNTER (OUTPATIENT)
Dept: GENERAL RADIOLOGY | Age: 63
Discharge: HOME | End: 2023-05-04
Payer: COMMERCIAL

## 2023-05-04 ENCOUNTER — OFFICE VISIT (OUTPATIENT)
Dept: ENT CLINIC | Age: 63
End: 2023-05-04

## 2023-05-04 VITALS
WEIGHT: 155 LBS | HEART RATE: 93 BPM | SYSTOLIC BLOOD PRESSURE: 117 MMHG | BODY MASS INDEX: 25.83 KG/M2 | HEIGHT: 65 IN | OXYGEN SATURATION: 100 % | DIASTOLIC BLOOD PRESSURE: 77 MMHG

## 2023-05-04 DIAGNOSIS — J32.0 CHRONIC MAXILLARY SINUSITIS: Primary | ICD-10-CM

## 2023-05-04 DIAGNOSIS — R05.1 ACUTE COUGH: ICD-10-CM

## 2023-05-04 DIAGNOSIS — R09.89 CHEST CONGESTION: ICD-10-CM

## 2023-05-04 PROCEDURE — 71046 X-RAY EXAM CHEST 2 VIEWS: CPT

## 2023-05-04 RX ORDER — PREDNISONE 10 MG/1
40 TABLET ORAL DAILY
Qty: 20 TABLET | Refills: 0 | Status: SHIPPED | OUTPATIENT
Start: 2023-05-04 | End: 2023-05-09

## 2023-05-04 RX ORDER — AZITHROMYCIN 250 MG/1
250 TABLET, FILM COATED ORAL SEE ADMIN INSTRUCTIONS
Qty: 6 TABLET | Refills: 0 | Status: SHIPPED | OUTPATIENT
Start: 2023-05-04 | End: 2023-05-09

## 2023-05-04 NOTE — PROGRESS NOTES
S/P FESS. Breathing ok. Chest congestion. No energy. Cough. Fever last night to 101. PE: Bilateral middle meatal crusting and old blood. Nasal Endoscopy with Debridement  Pre Op Dx: Nasal congestion, post operative sinus surgery  Post Op Dx: Same  Procedure: Nasal endoscopy with debridement bilateral  Anesthesia: 2% lidocaine with afrin tiopical  EBL: None  Specimens: None  Findings: both sides nasal cavity and sinus crusting and mucus. Procedure:    After the application of afrin and pontocaine the nasal sinus cavity was debrided utilizing a zero degree carter endoscope with Kerrison rongeurs and daniel suctions on both sides. The sinus lining was healing well. No evidence of bleeding or rhinorrhea was noted. Tolerated well. Complications: None    A/P: Saline rinses. Ordered chest xray. Concern for post operative pneumonia.   Call with results

## 2023-05-20 ENCOUNTER — PATIENT MESSAGE (OUTPATIENT)
Dept: INTERNAL MEDICINE CLINIC | Age: 63
End: 2023-05-20

## 2023-05-20 DIAGNOSIS — F41.9 ANXIETY: ICD-10-CM

## 2023-05-20 DIAGNOSIS — G25.81 RESTLESS LEGS SYNDROME (RLS): ICD-10-CM

## 2023-05-22 ENCOUNTER — OFFICE VISIT (OUTPATIENT)
Dept: DERMATOLOGY | Age: 63
End: 2023-05-22
Payer: COMMERCIAL

## 2023-05-22 DIAGNOSIS — Z86.018 HISTORY OF DYSPLASTIC NEVUS: ICD-10-CM

## 2023-05-22 DIAGNOSIS — D48.5 NEOPLASM OF UNCERTAIN BEHAVIOR OF SKIN: ICD-10-CM

## 2023-05-22 DIAGNOSIS — L40.9 PSORIASIS: ICD-10-CM

## 2023-05-22 DIAGNOSIS — L82.1 SEBORRHEIC KERATOSIS: ICD-10-CM

## 2023-05-22 DIAGNOSIS — Z87.2 HISTORY OF ACTINIC KERATOSES: ICD-10-CM

## 2023-05-22 DIAGNOSIS — L73.8 SEBACEOUS GLAND HYPERPLASIA: ICD-10-CM

## 2023-05-22 DIAGNOSIS — D18.01 CHERRY ANGIOMA: ICD-10-CM

## 2023-05-22 DIAGNOSIS — D22.9 MULTIPLE NEVI: Primary | ICD-10-CM

## 2023-05-22 PROCEDURE — 99214 OFFICE O/P EST MOD 30 MIN: CPT | Performed by: DERMATOLOGY

## 2023-05-22 RX ORDER — CLONAZEPAM 0.5 MG/1
0.5 TABLET ORAL DAILY PRN
Qty: 90 TABLET | Refills: 0 | Status: SHIPPED | OUTPATIENT
Start: 2023-05-22 | End: 2023-08-20

## 2023-05-23 ENCOUNTER — OFFICE VISIT (OUTPATIENT)
Dept: ENT CLINIC | Age: 63
End: 2023-05-23
Payer: COMMERCIAL

## 2023-05-23 VITALS
TEMPERATURE: 96.9 F | HEART RATE: 71 BPM | SYSTOLIC BLOOD PRESSURE: 124 MMHG | WEIGHT: 159.2 LBS | BODY MASS INDEX: 26.52 KG/M2 | HEIGHT: 65 IN | DIASTOLIC BLOOD PRESSURE: 74 MMHG

## 2023-05-23 DIAGNOSIS — J32.0 CHRONIC MAXILLARY SINUSITIS: Primary | ICD-10-CM

## 2023-05-23 PROCEDURE — 31237 NSL/SINS NDSC SURG BX POLYPC: CPT | Performed by: OTOLARYNGOLOGY

## 2023-05-23 NOTE — PROGRESS NOTES
Follow up revision FESS 4-2023. Post operative visit 2. Was on post operative antibiotics. Still with thick chunks. Felt better for a few days after levaquin but antibioic made it hard for her to sleep. PE: Scant mucus crusting. Nasal Endoscopy with Debridement  Pre Op Dx: Nasal congestion, post operative sinus surgery  Post Op Dx: Same  Procedure: Nasal endoscopy with debridement bilateral  Anesthesia: 2% lidocaine with afrin tiopical  EBL: None  Specimens: None  Findings: both sides nasal cavity and sinus crusting and mucus. Procedure:    After the application of afrin and pontocaine a culture was obtained from the left maxillary sinus and the nasal sinus cavity was debrided utilizing a zero degree carter endoscope with Kerrison rongeurs and daniel suctions on both sides. The sinus lining was healing well. No evidence of bleeding or rhinorrhea was noted. Tolerated well. Complications: None    A/P: Call with culture results.

## 2023-05-30 ENCOUNTER — TELEPHONE (OUTPATIENT)
Dept: ENT CLINIC | Age: 63
End: 2023-05-30

## 2023-05-30 RX ORDER — CLINDAMYCIN HYDROCHLORIDE 300 MG/1
300 CAPSULE ORAL 3 TIMES DAILY
Qty: 30 CAPSULE | Refills: 0 | Status: SHIPPED | OUTPATIENT
Start: 2023-05-30 | End: 2023-06-09

## 2023-05-30 NOTE — TELEPHONE ENCOUNTER
----- Message from Alfred Melara MD sent at 5/30/2023  7:20 AM EDT -----  Please let Ms. Kim Amato know her culture grew bacteria that should respond to Clinda. I sent an Rx to her pharmacy.    FILIPPO

## 2023-06-22 ENCOUNTER — OFFICE VISIT (OUTPATIENT)
Dept: INTERNAL MEDICINE CLINIC | Age: 63
End: 2023-06-22
Payer: COMMERCIAL

## 2023-06-22 ENCOUNTER — PATIENT MESSAGE (OUTPATIENT)
Dept: DERMATOLOGY | Age: 63
End: 2023-06-22

## 2023-06-22 VITALS
DIASTOLIC BLOOD PRESSURE: 68 MMHG | WEIGHT: 158 LBS | BODY MASS INDEX: 26.33 KG/M2 | HEIGHT: 65 IN | SYSTOLIC BLOOD PRESSURE: 128 MMHG

## 2023-06-22 DIAGNOSIS — L40.9 PSORIASIS: ICD-10-CM

## 2023-06-22 DIAGNOSIS — Z00.00 ROUTINE GENERAL MEDICAL EXAMINATION AT A HEALTH CARE FACILITY: Primary | ICD-10-CM

## 2023-06-22 DIAGNOSIS — E78.2 MIXED HYPERLIPIDEMIA: ICD-10-CM

## 2023-06-22 DIAGNOSIS — I10 ESSENTIAL HYPERTENSION: ICD-10-CM

## 2023-06-22 DIAGNOSIS — L40.50 PSORIATIC ARTHRITIS (HCC): ICD-10-CM

## 2023-06-22 DIAGNOSIS — F41.9 ANXIETY: ICD-10-CM

## 2023-06-22 PROCEDURE — 99396 PREV VISIT EST AGE 40-64: CPT | Performed by: INTERNAL MEDICINE

## 2023-06-22 PROCEDURE — 3074F SYST BP LT 130 MM HG: CPT | Performed by: INTERNAL MEDICINE

## 2023-06-22 PROCEDURE — 3078F DIAST BP <80 MM HG: CPT | Performed by: INTERNAL MEDICINE

## 2023-06-22 RX ORDER — CLOBETASOL PROPIONATE 0.46 MG/ML
SOLUTION TOPICAL
Qty: 50 ML | Refills: 5 | Status: SHIPPED | OUTPATIENT
Start: 2023-06-22

## 2023-06-22 ASSESSMENT — ENCOUNTER SYMPTOMS
DIARRHEA: 0
BACK PAIN: 0
CONSTIPATION: 0
SINUS PAIN: 0
COUGH: 0
SHORTNESS OF BREATH: 0

## 2023-06-22 NOTE — TELEPHONE ENCOUNTER
From: Samuel Berkowitz  To: Dr. Lynne Espinal  Sent: 6/22/2023 11:30 AM EDT  Subject: Clobetasol    Dr. Sara Kate:    Can you call in a refill prescription for the Clobetasol topical solution to the [address] [place] [address] for me with a few refills?     Thanks,  Shiva Burton

## 2023-06-22 NOTE — PROGRESS NOTES
2023    Samuel Berkowitz (:  1960) rachell 63 y.o. female, here for a preventive medicine evaluation. Patient Active Problem List   Diagnosis    Vitamin D deficiency    Neoplasm of uncertain behavior of skin    Pituitary adenoma ([address]    Hemifacial spasm    Solar lentigo    Personal history of malignant neoplasm of ovary    Irritable bowel syndrome    Anxiety    Female infertility    Arthritis    Atypical nevi    Actinic keratosis    Psoriasis    Unspecified polyarthropathy or polyarthritis, site unspecified    Hearing loss    Essential hypertension    Mixed hyperlipidemia    Seasonal allergic rhinitis due to pollen    Other constipation    Restless legs syndrome (RLS)    Psoriatic arthritis (HCC)    Vasomotor rhinitis    Epistaxis    Chronic maxillary sinusitis    Maxillary sinusitis, chronic     Doing well, sinuses finally seem to be clearing up. She had 1 more round of antibiotics after a sinus culture and it seems to have cleared up. Symptoms of psoriasis are controlled on the Cosentyx. Sleep is finally getting back to normal.  She is taking the Linzess every day and bowels are regular. Review of Systems   Constitutional:  Negative for fatigue and unexpected weight change. HENT:  Negative for ear pain and sinus pain. Eyes:  Negative for visual disturbance. Respiratory:  Negative for cough and shortness of breath. Cardiovascular:  Negative for chest pain and leg swelling. Gastrointestinal:  Negative for constipation and diarrhea. Genitourinary:  Negative for difficulty urinating and dysuria. Musculoskeletal:  Negative for arthralgias and back pain. Skin:  Positive for rash (just psoriasis on scalp). Neurological:  Negative for weakness and numbness. Psychiatric/Behavioral:  Negative for dysphoric mood. The patient is not nervous/anxious. Prior to Visit Medications    Medication Sig Taking?  Authorizing Provider   linaCLOtide (LINZESS) 72 MCG CAPS capsule Take 1

## 2023-06-29 ENCOUNTER — PATIENT MESSAGE (OUTPATIENT)
Dept: INTERNAL MEDICINE CLINIC | Age: 63
End: 2023-06-29

## 2023-06-29 RX ORDER — HYDROCHLOROTHIAZIDE 25 MG/1
25 TABLET ORAL EVERY MORNING
Qty: 90 TABLET | Refills: 1 | Status: SHIPPED | OUTPATIENT
Start: 2023-06-29

## 2023-07-24 RX ORDER — HYDROCHLOROTHIAZIDE 25 MG/1
TABLET ORAL
Qty: 90 TABLET | Refills: 1 | Status: SHIPPED | OUTPATIENT
Start: 2023-07-24

## 2023-08-17 ENCOUNTER — TELEMEDICINE (OUTPATIENT)
Dept: INTERNAL MEDICINE CLINIC | Age: 63
End: 2023-08-17
Payer: COMMERCIAL

## 2023-08-17 DIAGNOSIS — F41.9 ANXIETY: Primary | ICD-10-CM

## 2023-08-17 DIAGNOSIS — G25.81 RESTLESS LEGS SYNDROME (RLS): ICD-10-CM

## 2023-08-17 PROCEDURE — 99213 OFFICE O/P EST LOW 20 MIN: CPT | Performed by: INTERNAL MEDICINE

## 2023-08-17 RX ORDER — CLONAZEPAM 0.5 MG/1
0.5 TABLET ORAL DAILY PRN
Qty: 90 TABLET | Refills: 0 | Status: SHIPPED | OUTPATIENT
Start: 2023-08-17 | End: 2023-11-15

## 2023-08-17 NOTE — PROGRESS NOTES
Constantine Paz (:  1960) is a Established patient, presenting virtually for evaluation of the following:    Assessment & Plan   Below is the assessment and plan developed based on review of pertinent history, physical exam, labs, studies, and medications. 1. Anxiety  - stable. OARRS reviewed, no concerns  -     clonazePAM (KLONOPIN) 0.5 MG tablet; Take 1 tablet by mouth daily as needed for Anxiety for up to 90 days. , Disp-90 tablet, R-0Normal  2. Restless legs syndrome (RLS)  - stable. OARRS reviewed, no concerns  -     clonazePAM (KLONOPIN) 0.5 MG tablet; Take 1 tablet by mouth daily as needed for Anxiety for up to 90 days. , Disp-90 tablet, R-0Normal    Return in about 3 months (around 2023) for anxiety/restless legs. Subjective   HPI    Symptoms are stable. Her anxiety has been higher recently due to work stress. Clonazepam is still helpful for anxiety and RLS. Symptoms of RLS are controlled. No side effects. Review of Systems       Objective   Patient-Reported Vitals  No data recorded     Physical Exam  Vitals reviewed. Constitutional:       General: She is not in acute distress. Appearance: Normal appearance. HENT:      Head: Normocephalic and atraumatic. Pulmonary:      Effort: Pulmonary effort is normal. No respiratory distress. Neurological:      General: No focal deficit present. Mental Status: She is alert. Psychiatric:         Mood and Affect: Mood normal.         Behavior: Behavior normal.         Thought Content: Thought content normal.         Judgment: Judgment normal.                Constantine Paz, was evaluated through a synchronous (real-time) audio-video encounter. The patient (or guardian if applicable) is aware that this is a billable service, which includes applicable co-pays. This Virtual Visit was conducted with patient's (and/or legal guardian's) consent. Patient identification was verified, and a caregiver was present when appropriate.    The

## 2023-08-18 ENCOUNTER — TELEPHONE (OUTPATIENT)
Dept: DERMATOLOGY | Age: 63
End: 2023-08-18

## 2023-08-18 NOTE — TELEPHONE ENCOUNTER
Pt calling wanting to schedule appt for laser treatment on her arms pls return call back @ [phone]

## 2023-08-30 RX ORDER — LISINOPRIL 20 MG/1
TABLET ORAL
Qty: 90 TABLET | Refills: 1 | Status: SHIPPED | OUTPATIENT
Start: 2023-08-30

## 2023-09-11 ENCOUNTER — TELEPHONE (OUTPATIENT)
Dept: INTERNAL MEDICINE CLINIC | Age: 63
End: 2023-09-11

## 2023-09-11 NOTE — TELEPHONE ENCOUNTER
I do not have a documented ACE inhibitor allergy and she has been on this for awhile, I would refill as usual  They can double check with her if they would like, she is very aware of her meds and allergies

## 2023-09-11 NOTE — TELEPHONE ENCOUNTER
Their records indicate allergic to ace inhibaters. She was prescribed:    lisinopril (PRINIVIL;ZESTRIL) 20 MG tablet    They want to make sure she is to continue taking the Lisinopril. ref # [id number]    They are putting the prescription on hold until they hear back from us. Please call and advise.

## 2023-09-11 NOTE — TELEPHONE ENCOUNTER
Spoke to pt she stated that she is not allergic to ace inhibater.       Spoke to Tasia buckley MD and pt advise thanked

## 2023-10-09 RX ORDER — MELOXICAM 15 MG/1
TABLET ORAL
Qty: 14 TABLET | Refills: 0 | Status: SHIPPED | OUTPATIENT
Start: 2023-10-09

## 2023-10-09 RX ORDER — MELOXICAM 15 MG/1
TABLET ORAL
Qty: 90 TABLET | Refills: 1 | Status: SHIPPED | OUTPATIENT
Start: 2023-10-09

## 2023-10-19 ENCOUNTER — TELEMEDICINE (OUTPATIENT)
Dept: INTERNAL MEDICINE CLINIC | Age: 63
End: 2023-10-19
Payer: COMMERCIAL

## 2023-10-19 DIAGNOSIS — I10 ESSENTIAL HYPERTENSION: ICD-10-CM

## 2023-10-19 DIAGNOSIS — F41.9 ANXIETY: Primary | ICD-10-CM

## 2023-10-19 PROCEDURE — 99213 OFFICE O/P EST LOW 20 MIN: CPT | Performed by: INTERNAL MEDICINE

## 2023-10-19 NOTE — PROGRESS NOTES
Marianne Wilkins, was evaluated through a synchronous (real-time) audio-video encounter. The patient (or guardian if applicable) is aware that this is a billable service, which includes applicable co-pays. This Virtual Visit was conducted with patient's (and/or legal guardian's) consent. Patient identification was verified, and a caregiver was present when appropriate. The patient was located at Home: [address] [facility] [address]  Provider was located at [address]): [phone] [address]. [address] [place] [address], [address] [place] [address] Rd,  Sarath      Marianne Wilkins (:  1960) is a Established patient, presenting virtually for evaluation of the following:    Assessment & Plan   Below is the assessment and plan developed based on review of pertinent history, physical exam, labs, studies, and medications. 1. Anxiety   -Increased in the setting of stressors. Okay to continue increased dose of clonazepam 1 mg at night for short-term stressors, her gynecologist already sent this in. We will follow-up in 3 months  2. Essential hypertension   -Stable, continue current medication. NSAIDs could be contributing. She will work on increasing exercise, continue current medications    Return in about 3 months (around 2024) for anxiety. Subjective   HPI    Having significant bouts of insomnia - lots of stressors going on. Her son is getting  and she is hosting a shower out of town, daughter just had pneumonia, and there have been multiple changes at work. Either can't fall asleep or wakes up in the middle of the night. Her gynecologist suggested doubling the clonazepam which has helped. Arthritis has been a little worse - feeling like the Cosentyx wears off early, thinks weather may be playing a role. Psoriasis is under good control, limited to scalp right now which is excellent for her.      Blood pressure is doing ok with the current meds - would like to be on the lower dose again but right

## 2023-11-02 ENCOUNTER — PROCEDURE VISIT (OUTPATIENT)
Dept: DERMATOLOGY | Age: 63
End: 2023-11-02

## 2023-11-02 DIAGNOSIS — L81.4 LENTIGINES: Primary | ICD-10-CM

## 2023-11-02 NOTE — PROGRESS NOTES
(COSENTYX SENSOREADY PEN) 150 MG/ML SOAJ Inject 2 pen Q 28 days/ (Patient taking differently: Inject 2 pen Q 28 days/300 MG) 2 Adjustable Dose Pre-filled Pen Syringe 5    atorvastatin (LIPITOR) 20 MG tablet TAKE 1 TABLET DAILY 90 tablet 3    tretinoin (RETIN-A) 0.05 % cream Apply a pea sized amount to the face QHS (Patient taking differently: Apply a pea sized amount to the face/PT TAKING TWICE WEEKLY) 45 g 5    Calcipotriene (SORILUX) 0.005 % FOAM APPLY EXTERNALLY TO THE SCALP DAILY FOR PSORIASIS (Patient taking differently: APPLY EXTERNALLY TO THE SCALP DAILY FOR PSORIASIS/ PT TAKING AS NEEDED) 120 g 5    fluocinolone (DERMA-SMOOTHE/FS SCALP) 0.01 % external oil Apply topically at bedtime with cap as directed. Wash in the morning. 120 mL 5    CLOBEX SPRAY 0.05 % LIQD Apply to scalp daily - bid prn flares. 250 mL 3    Estradiol (VAGIFEM) 10 MCG TABS vaginal tablet INSERT 1 TABLET VAGINALLY TWICE WEEKLY      Ascorbic Acid (VITAMIN C) 500 MG tablet Take 2 tablets by mouth daily      Cholecalciferol (VITAMIN D) 1000 UNIT CAPS Take 1 capsule by mouth daily      Flaxseed, Linseed, (FLAXSEED OIL) 1000 MG CAPS Take  by mouth. Green Tea 250 MG CAPS Take  by mouth daily. PROBIOTIC CAPS Take  by mouth daily.        Allergies   Allergen Reactions    Sulfa Antibiotics Rash    Codeine      VOMITING    Percocet [Oxycodone-Acetaminophen] Nausea And Vomiting       Past Medical History:   Diagnosis Date    Actinic keratosis 06/28/2010    Allergic rhinitis     Anxiety 06/28/2010    Cancer ([address])     ovarian    Chronic idiopathic constipation 01/19/2017    Hearing loss     Hemifacial spasm     Botox    Hyperlipidemia     Hypertension     Osteoarthritis     PONV (postoperative nausea and vomiting)     Prolonged emergence from general anesthesia     Psoriasis     Psoriatic arthritis ([address])     Restless legs     Sinusitis     TMJ dysfunction     Tooth wear     CAPS TEETH    Wears glasses      Past Surgical History:   Procedure

## 2023-11-09 ENCOUNTER — OFFICE VISIT (OUTPATIENT)
Dept: DERMATOLOGY | Age: 63
End: 2023-11-09

## 2023-11-09 DIAGNOSIS — L73.8 SEBACEOUS GLAND HYPERPLASIA: ICD-10-CM

## 2023-11-09 DIAGNOSIS — D18.01 CHERRY ANGIOMA: ICD-10-CM

## 2023-11-09 DIAGNOSIS — Z86.018 HISTORY OF DYSPLASTIC NEVUS: ICD-10-CM

## 2023-11-09 DIAGNOSIS — D48.5 NEOPLASM OF UNCERTAIN BEHAVIOR OF SKIN: ICD-10-CM

## 2023-11-09 DIAGNOSIS — Z87.2 HISTORY OF ACTINIC KERATOSES: ICD-10-CM

## 2023-11-09 DIAGNOSIS — L82.1 SEBORRHEIC KERATOSIS: ICD-10-CM

## 2023-11-09 DIAGNOSIS — L40.9 PSORIASIS: ICD-10-CM

## 2023-11-09 DIAGNOSIS — D22.9 MULTIPLE NEVI: Primary | ICD-10-CM

## 2023-11-09 NOTE — PROGRESS NOTES
04/28/2023    BILATERAL ANTERIOR ETHMOIDECTOMY AND BILATERAL MAXILLARY ANTROSTOMY performed by Charlotte Garcia MD at [address] (CERVIX REMOVED)      TOE SURGERY      TONSILLECTOMY      2014    VARICOSE VEIN SURGERY      WRIST SURGERY      right wrist, 93 & 94 FUSE, 4 STAPLES       Physical Examination     Gen, well-appearing  FSE today    trunk and extremities with scattered brown macules and papules   No AK's  Occipital scalp clear today  Chest with small erythematous dry macule  Left upper back, near linear scar from cyst removal -scar clear  R lower eyelid with barely visible dimple at lashline  R frontal scalp with red dome-shaped papule  Ala with lobulated 1-2 mm yellowish papules    Assessment and Plan     1. Benign-appearing nevi and few SKs  2. AK's - no new concerns  - monitor si/sx/ABCD's of MM   educ sun protection - OTC sunscreen with SPF 30-50+ recommended and reviewed usage  encouraged skin check yearly (sooner if indicated), self checks    3. Psoriasis - mild on the scalp and focal macules on the legs + arms, ~2% BSA  - clobex spray/soln for the scalp prn flares; ed se/misuse  - clobetasol oint daily - bid prn flares; ed se/misuse  - cont soriulux for the scalp  - can use dermasmoothe oil prn more severe scalp flares  - sampled wynzora and duobrii in the past  - can also try enstilar but more expensive and fairly clear currently  - cont Cosentx per rheum - better skin control    4. Moderately dysplastic nevus - L upper back near scar from cyst - removed  - Recurrent and removed  - remains clear  -Continue sun protection as above    5. small divet on the R eyelid - noticed in 2022 - have discussed could be BCC but less likely - seems to be punctum to gland  - had to reschedule eye appt for this fall - Dr. Kati Arias [facility], oculoplastics)    6.  Vbeam - angioma - R frontal scalp - persistent - can repeat Vbeam trx today and NC  - settings - 7

## 2023-12-04 ENCOUNTER — PATIENT MESSAGE (OUTPATIENT)
Dept: INTERNAL MEDICINE CLINIC | Age: 63
End: 2023-12-04

## 2023-12-04 RX ORDER — LEVOFLOXACIN 750 MG/1
750 TABLET, FILM COATED ORAL DAILY
Qty: 7 TABLET | Refills: 0 | Status: SHIPPED | OUTPATIENT
Start: 2023-12-04 | End: 2023-12-11

## 2023-12-04 NOTE — TELEPHONE ENCOUNTER
From: Mildred Hill  To: Dr. Evelia Sosa: 12/4/2023 8:52 AM EST  Subject: Sinus issues    Dr Geovanna Mott,    I have a sinus infection again with a cough. Can you call something in for me to [id number] [facility] on [address]?     Thank you,  Vangie Singh

## 2023-12-06 ENCOUNTER — PROCEDURE VISIT (OUTPATIENT)
Dept: DERMATOLOGY | Age: 63
End: 2023-12-06

## 2023-12-06 DIAGNOSIS — L81.4 LENTIGINES: Primary | ICD-10-CM

## 2023-12-06 PROCEDURE — DM01505 PIGMENTED LASER 2-5 LESIONS: Performed by: DERMATOLOGY

## 2023-12-06 PROCEDURE — DM01520 PIGMENTED LASER SMALL AREA (EG. HANDS): Performed by: DERMATOLOGY

## 2023-12-06 NOTE — PROGRESS NOTES
[facility]  Mansoor Chu MD  [phone]      Jose Worley  1960    66 y.o. female     Date of Visit: 12/6/2023    Last seen:  for laser and FSE    Chief Complaint: lentigines - laser  Chief Complaint   Patient presents with    Procedure     History of Present Illness:  *went to a [employer] [place] in [address] [facility] in 2022    Here for trx of lentigines/photodamage on the legs. Recent trx of chest, arms, L cheek with IPL/GL in the past with good results and no complications. L [facility] - bx 2021    S/p excision of 2 cysts on the back in 2018 - one had ruptured back in the summer -  Ended up in ED and had I/D with packing and abx - clinda then doxy d/t culture grew staph lugdunensis prior to excision. Hx of ovarian cancer; her sister was diagnosed with breast CA. In the past few years - her sister was BRCA neg. She has had her veins treated. Review of Systems:  Gen: Feels well, good sense of health. Past Medical History, Family History, Surgical History, Medications and Allergies reviewed.     Outpatient Medications Marked as Taking for the 12/6/23 encounter (Procedure visit) with Nu Espinal MD   Medication Sig Dispense Refill    levoFLOXacin (LEVAQUIN) 750 MG tablet Take 1 tablet by mouth daily for 7 days 7 tablet 0    meloxicam (MOBIC) 15 MG tablet TAKE 1 TABLET DAILY 90 tablet 1    lisinopril (PRINIVIL;ZESTRIL) 20 MG tablet TAKE 1 TABLET DAILY 90 tablet 1    hydroCHLOROthiazide (HYDRODIURIL) 25 MG tablet TAKE 1 TABLET EVERY MORNING 90 tablet 1    linaCLOtide (LINZESS) 72 MCG CAPS capsule Take 1 capsule by mouth every morning (before breakfast) 90 capsule 1    clobetasol (TEMOVATE) 0.05 % external solution Apply topically 2 times daily prn flares 50 mL 5    MAGNESIUM-ZINC PO Take by mouth at bedtime      Coenzyme Q10 10 MG CAPS Take by mouth      secukinumab (COSENTYX SENSOREADY PEN) 150 MG/ML SOAJ Inject 2 pen Q 28 days/ (Patient taking differently: Inject 2

## 2023-12-15 RX ORDER — LINACLOTIDE 72 UG/1
72 CAPSULE, GELATIN COATED ORAL
Qty: 90 CAPSULE | Refills: 1 | Status: SHIPPED | OUTPATIENT
Start: 2023-12-15

## 2024-01-24 DIAGNOSIS — F41.9 ANXIETY: ICD-10-CM

## 2024-01-24 DIAGNOSIS — G25.81 RESTLESS LEGS SYNDROME (RLS): ICD-10-CM

## 2024-01-24 RX ORDER — CLONAZEPAM 0.5 MG/1
0.5 TABLET ORAL DAILY PRN
Qty: 90 TABLET | Refills: 0 | Status: SHIPPED | OUTPATIENT
Start: 2024-01-24 | End: 2024-04-23

## 2024-01-24 NOTE — TELEPHONE ENCOUNTER
----- Message from Magy Buddhism sent at 1/24/2024  9:14 AM EST -----  Subject: Message to Provider    QUESTIONS  Information for Provider? need a refill before 1/30 robert  ---------------------------------------------------------------------------  --------------  CALL BACK INFO  [phone]; OK to leave message on voicemail  ---------------------------------------------------------------------------  --------------  SCRIPT ANSWERS  Relationship to Patient? Self

## 2024-01-30 ENCOUNTER — TELEMEDICINE (OUTPATIENT)
Dept: INTERNAL MEDICINE CLINIC | Age: 64
End: 2024-01-30
Payer: COMMERCIAL

## 2024-01-30 DIAGNOSIS — E78.2 MIXED HYPERLIPIDEMIA: ICD-10-CM

## 2024-01-30 DIAGNOSIS — F41.9 ANXIETY: Primary | ICD-10-CM

## 2024-01-30 PROCEDURE — 99213 OFFICE O/P EST LOW 20 MIN: CPT | Performed by: INTERNAL MEDICINE

## 2024-01-30 ASSESSMENT — PATIENT HEALTH QUESTIONNAIRE - PHQ9
SUM OF ALL RESPONSES TO PHQ QUESTIONS 1-9: 0
2. FEELING DOWN, DEPRESSED OR HOPELESS: 0
SUM OF ALL RESPONSES TO PHQ9 QUESTIONS 1 & 2: 0
SUM OF ALL RESPONSES TO PHQ QUESTIONS 1-9: 0
1. LITTLE INTEREST OR PLEASURE IN DOING THINGS: 0

## 2024-01-30 NOTE — PROGRESS NOTES
Ginger Bateman, was evaluated through a synchronous (real-time) audio-video encounter. The patient (or guardian if applicable) is aware that this is a billable service, which includes applicable co-pays. This Virtual Visit was conducted with patient's (and/or legal guardian's) consent. Patient identification was verified, and a caregiver was present when appropriate.   The patient was located at Home: [address] [place] [id number]  Provider was located at Facility (Appt Dept): [address] [place] [address], [address]  [place] [address]      Ginger Bateman (:  1960) is a Established patient, presenting virtually for evaluation of the following:    Assessment & Plan   Below is the assessment and plan developed based on review of pertinent history, physical exam, labs, studies, and medications.  1. Anxiety   - stable, last prescription sent as 0.5mg since that had been the last through this office - if she runs out early due to using 1mg will contact office for refill. OARRS reviewed, no concerns  2. Mixed hyperlipidemia   - controlled on atorvastatin, due for lipids, may get with her other blood work    Return in about 3 months (around 2024) for sleep/anxiety.       Subjective   HPI    Still on the 1mg dose of clonazepam working well, no side effects. Anxiety is stable. She had her son's wedding, was sick during it but overall went well.    She is taking her blood pressure nad cholesterol medication. Labs checked through her rheumatologist for enbrel.    Review of Systems       Objective   Patient-Reported Vitals  No data recorded     Physical Exam  Vitals reviewed.   Constitutional:       General: She is not in acute distress.     Appearance: Normal appearance. She is well-developed.   HENT:      Head: Normocephalic and atraumatic.   Pulmonary:      Effort: Pulmonary effort is normal. No respiratory distress.   Skin:     General: Skin is warm and dry.   Neurological:      Mental Status: She

## 2024-02-08 ENCOUNTER — PATIENT MESSAGE (OUTPATIENT)
Dept: INTERNAL MEDICINE CLINIC | Age: 64
End: 2024-02-08

## 2024-02-08 ENCOUNTER — PATIENT MESSAGE (OUTPATIENT)
Dept: DERMATOLOGY | Age: 64
End: 2024-02-08

## 2024-02-08 NOTE — TELEPHONE ENCOUNTER
From: Ginger Gamez  To: Dr. Anju [address]  Sent: 2024 2:29 PM EST  Subject: New Pharmacy    I am able to get my prescriptions filled at [facility] or [facility]. Please update my record.    Thank you!    ginger gamez  : [birth year]

## 2024-03-14 ENCOUNTER — TELEPHONE (OUTPATIENT)
Dept: ADMINISTRATIVE | Age: 64
End: 2024-03-14

## 2024-03-14 NOTE — TELEPHONE ENCOUNTER
LM for pt with advise     Asked pt to call and leave information if she does have different coverage.

## 2024-03-14 NOTE — TELEPHONE ENCOUNTER
Patients Rx was changed to the following at the beginning of the year:    True Rx  Rx bin [id number]  Rx pcn [id number]  Rx group [id number]    Call her if you need anything else.

## 2024-03-14 NOTE — TELEPHONE ENCOUNTER
Submitted PA for Trulance 3MG tablets   Via [employer] Key: [id number] STATUS: The member benefit does not include pharmacy drug coverage. Eligible drugs may be covered under the medical benefit.     PA was submitted to [facility].  Does patient have pharmacy benefits with another insurance or PBM?    Please respond to the pool ( P MHCX PSC MEDICATION PRE-AUTH).      Thank you!

## 2024-03-15 NOTE — TELEPHONE ENCOUNTER
Submitted PA for Trulance 3MG tablets  Via [facility] Key: [id number] STATUS: PENDING.    Follow up done daily; if no decision with in three days we will refax.  If another three days goes by with no decision will call the insurance for status.

## 2024-03-29 ENCOUNTER — PATIENT MESSAGE (OUTPATIENT)
Dept: INTERNAL MEDICINE CLINIC | Age: 64
End: 2024-03-29

## 2024-03-29 NOTE — TELEPHONE ENCOUNTER
From: Ginger Bateman  To: Dr. Anju [address]  Sent: 3/29/2024 1:34 PM EDT  Subject: Linzess/Trulance    Dr. [address]:    I went to  my Trulance today and the cost was $850. My insurance wants to charge $560 for Linzess and $850 for Trulance. I can't afford this along with all my other medications.    Is there a cheaper or over the counter option for me that will work?    Thanks,  Ginger Bateman

## 2024-04-03 ENCOUNTER — TELEPHONE (OUTPATIENT)
Dept: INTERNAL MEDICINE CLINIC | Age: 64
End: 2024-04-03

## 2024-04-03 NOTE — TELEPHONE ENCOUNTER
Submitted PA for Linzess 72MCG capsules  Via [facility] Key: [id number] STATUS: PENDING.    Follow up done daily; if no decision with in three days we will refax.  If another three days goes by with no decision will call the insurance for status.

## 2024-04-19 NOTE — TELEPHONE ENCOUNTER
DENIAL for Linzess 72MCG capsules; letter attached.    If this requires a response please respond to the pool ( P MHCX PSC MEDICATION PRE-AUTH).      Thank you please advise patient.

## 2024-05-07 NOTE — TELEPHONE ENCOUNTER
Spoke to pt she stated she did not use anything else treatment wise. She said the Trulance was called in but it cost $800 and she can't afford that.

## 2024-05-07 NOTE — TELEPHONE ENCOUNTER
Spoke to pt she stated that she did not take senna and she tried miralax but I did not help her at all

## 2024-05-08 NOTE — TELEPHONE ENCOUNTER
Need a failure of 2 medications. So we've got Miralax, but we need 1 more.  What about another OTC medication like Dulcolax, Senokot, Colace, Metamucil, Milk of Magnesia, or Lactulose?    Please respond to the pool ( P MHCX PSC MEDICATION PRE-AUTH).      Thank you!

## 2024-05-09 ENCOUNTER — OFFICE VISIT (OUTPATIENT)
Dept: DERMATOLOGY | Age: 64
End: 2024-05-09
Payer: COMMERCIAL

## 2024-05-09 DIAGNOSIS — L73.8 SEBACEOUS GLAND HYPERPLASIA: ICD-10-CM

## 2024-05-09 DIAGNOSIS — L40.9 PSORIASIS: ICD-10-CM

## 2024-05-09 DIAGNOSIS — Z87.2 HISTORY OF ACTINIC KERATOSES: ICD-10-CM

## 2024-05-09 DIAGNOSIS — Z86.018 HISTORY OF DYSPLASTIC NEVUS: ICD-10-CM

## 2024-05-09 DIAGNOSIS — L91.8 INFLAMED SKIN TAG: ICD-10-CM

## 2024-05-09 DIAGNOSIS — D18.01 CHERRY ANGIOMA: ICD-10-CM

## 2024-05-09 DIAGNOSIS — D48.5 NEOPLASM OF UNCERTAIN BEHAVIOR OF SKIN: ICD-10-CM

## 2024-05-09 DIAGNOSIS — L82.1 SEBORRHEIC KERATOSIS: ICD-10-CM

## 2024-05-09 DIAGNOSIS — D22.9 MULTIPLE NEVI: Primary | ICD-10-CM

## 2024-05-09 PROCEDURE — 99214 OFFICE O/P EST MOD 30 MIN: CPT | Performed by: DERMATOLOGY

## 2024-05-09 PROCEDURE — 11200 RMVL SKIN TAGS UP TO&INC 15: CPT | Performed by: DERMATOLOGY

## 2024-05-09 PROCEDURE — 11102 TANGNTL BX SKIN SINGLE LES: CPT | Performed by: DERMATOLOGY

## 2024-05-09 NOTE — PATIENT INSTRUCTIONS

## 2024-05-09 NOTE — PROGRESS NOTES
microvascular reconstruction to fix hemifacial spasm    RHINOPLASTY      SHOULDER SURGERY      left shoulder    SINUS ENDOSCOPY Bilateral 04/28/2023    BILATERAL ANTERIOR ETHMOIDECTOMY AND BILATERAL MAXILLARY ANTROSTOMY performed by Kermit Schuster MD at [facility]    SINUS SURGERY      AMRITA AND BSO (CERVIX REMOVED)      TOE SURGERY      TONSILLECTOMY      2014    VARICOSE VEIN SURGERY      WRIST SURGERY      right wrist, 93 & 94 FUSE, 4 STAPLES       Physical Examination     Gen, well-appearing  FSE today    trunk and extremities with scattered brown macules and papules   No AK's  Occipital scalp clear today  Chest with small erythematous dry macule  Left upper back, near linear scar from cyst removal -scar clear  R lower eyelid clear  R frontal scalp with thin red papule  Ala with lobulated 1-2 mm yellowish papules  L axilla with pedunculated pinkish-tan papule    Assessment and Plan     1. Benign-appearing nevi and few SKs  2. AK's - no new concerns  - monitor si/sx/ABCD's of MM   educ sun protection - OTC sunscreen with SPF 30-50+ recommended and reviewed usage  encouraged skin check yearly (sooner if indicated), self checks    3. Psoriasis - mild on the scalp and focal macules on the legs + arms, ~2% BSA  - clobex spray/soln for the scalp prn flares; ed se/misuse  - clobetasol oint daily - bid prn flares; ed se/misuse  - cont soriulux for the scalp  - can use dermasmoothe oil prn more severe scalp flares  - sampled wynzora and duobrii in the past  - can also try enstilar but more expensive and fairly clear currently  - cont Cosentx per rheum - better skin control  - notes and labs reviewed    4. Moderately dysplastic nevus - L upper back near scar from cyst - removed  - Recurrent and removed  - remains clear  -Continue sun protection as above    5. small divet on the R eyelid - noticed in 2022 - have discussed could be BCC but less likely - seems to be punctum to gland and not really visible

## 2024-05-10 ENCOUNTER — PATIENT MESSAGE (OUTPATIENT)
Dept: INTERNAL MEDICINE CLINIC | Age: 64
End: 2024-05-10

## 2024-05-10 DIAGNOSIS — F41.9 ANXIETY: ICD-10-CM

## 2024-05-10 DIAGNOSIS — E78.2 MIXED HYPERLIPIDEMIA: ICD-10-CM

## 2024-05-10 RX ORDER — CLONAZEPAM 1 MG/1
1 TABLET ORAL NIGHTLY PRN
Qty: 90 TABLET | Refills: 0 | Status: SHIPPED | OUTPATIENT
Start: 2024-05-10 | End: 2024-08-08

## 2024-05-10 RX ORDER — ATORVASTATIN CALCIUM 20 MG/1
20 TABLET, FILM COATED ORAL DAILY
Qty: 90 TABLET | Refills: 3 | Status: SHIPPED | OUTPATIENT
Start: 2024-05-10

## 2024-05-10 RX ORDER — MELOXICAM 15 MG/1
TABLET ORAL
Qty: 90 TABLET | Refills: 1 | Status: SHIPPED | OUTPATIENT
Start: 2024-05-10

## 2024-05-10 RX ORDER — HYDROCHLOROTHIAZIDE 25 MG/1
25 TABLET ORAL EVERY MORNING
Qty: 90 TABLET | Refills: 1 | Status: SHIPPED | OUTPATIENT
Start: 2024-05-10

## 2024-05-10 NOTE — TELEPHONE ENCOUNTER
From: Ginger Bateman  To: Dr. Anju [address]  Sent: 5/10/2024 11:05 AM EDT  Subject: Refills Needed    Dr. [address]:    Can you please call in the following refills to [facility] on [facility] [place], I think they only allow 30 days with refills:    Hydrochlorothiazide 25 mg  Meloxicam 15 mg  Atorvastatin 20 mg  Clonazepam 1 mg    Thank you,  Ginger Bateman

## 2024-05-13 ENCOUNTER — PATIENT MESSAGE (OUTPATIENT)
Dept: DERMATOLOGY | Age: 64
End: 2024-05-13

## 2024-05-13 NOTE — TELEPHONE ENCOUNTER
From: Ginger Bateman  To: Dr. Makeda Espinal  Sent: 5/13/2024 8:58 AM EDT  Subject: Refill Needed    Can you please call in the clobetasol Propionate topical solution to [facility] on [facility] with refills?    Thank you!    Ginger Bateman

## 2024-05-14 LAB — DERMATOLOGY PATHOLOGY REPORT: NORMAL

## 2024-05-15 RX ORDER — CLOBETASOL PROPIONATE 0.46 MG/ML
SOLUTION TOPICAL
Qty: 50 ML | Refills: 5 | Status: SHIPPED | OUTPATIENT
Start: 2024-05-15

## 2024-05-17 ENCOUNTER — TELEPHONE (OUTPATIENT)
Dept: INTERNAL MEDICINE CLINIC | Age: 64
End: 2024-05-17

## 2024-05-17 NOTE — TELEPHONE ENCOUNTER
APPROVAL of Appeal for Linzess 72G through 09/30/2025; letter attached.    If this requires a response please respond to the pool ( P MHCX PSC MEDICATION PRE-AUTH).      Thank you please advise patient.

## 2024-06-06 ENCOUNTER — OFFICE VISIT (OUTPATIENT)
Dept: INTERNAL MEDICINE CLINIC | Age: 64
End: 2024-06-06
Payer: COMMERCIAL

## 2024-06-06 VITALS
DIASTOLIC BLOOD PRESSURE: 68 MMHG | WEIGHT: 156 LBS | HEIGHT: 65 IN | BODY MASS INDEX: 25.99 KG/M2 | SYSTOLIC BLOOD PRESSURE: 128 MMHG

## 2024-06-06 DIAGNOSIS — G25.81 RESTLESS LEGS SYNDROME (RLS): Primary | ICD-10-CM

## 2024-06-06 DIAGNOSIS — F41.9 ANXIETY: ICD-10-CM

## 2024-06-06 PROCEDURE — 99214 OFFICE O/P EST MOD 30 MIN: CPT | Performed by: STUDENT IN AN ORGANIZED HEALTH CARE EDUCATION/TRAINING PROGRAM

## 2024-06-06 PROCEDURE — 3074F SYST BP LT 130 MM HG: CPT | Performed by: STUDENT IN AN ORGANIZED HEALTH CARE EDUCATION/TRAINING PROGRAM

## 2024-06-06 PROCEDURE — 3078F DIAST BP <80 MM HG: CPT | Performed by: STUDENT IN AN ORGANIZED HEALTH CARE EDUCATION/TRAINING PROGRAM

## 2024-06-06 RX ORDER — CETIRIZINE HYDROCHLORIDE 10 MG/1
10 TABLET ORAL DAILY
COMMUNITY

## 2024-06-06 SDOH — ECONOMIC STABILITY: FOOD INSECURITY: WITHIN THE PAST 12 MONTHS, THE FOOD YOU BOUGHT JUST DIDN'T LAST AND YOU DIDN'T HAVE MONEY TO GET MORE.: NEVER TRUE

## 2024-06-06 SDOH — ECONOMIC STABILITY: FOOD INSECURITY: WITHIN THE PAST 12 MONTHS, YOU WORRIED THAT YOUR FOOD WOULD RUN OUT BEFORE YOU GOT MONEY TO BUY MORE.: NEVER TRUE

## 2024-06-06 SDOH — ECONOMIC STABILITY: INCOME INSECURITY: HOW HARD IS IT FOR YOU TO PAY FOR THE VERY BASICS LIKE FOOD, HOUSING, MEDICAL CARE, AND HEATING?: NOT HARD AT ALL
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
## 2024-06-07 NOTE — ASSESSMENT & PLAN NOTE
Hx of very bothersome RLS. Patient has been on klonopin which helps this symptoms as well. Per patient, she has not tried any other medication for this problem  - klonopin not indicated for tx of this problem  - will discuss very slow taper off benzos with patient at next visit -- patient is agreeable to try this  - will likely try gabapentin or lyrica instead

## 2024-06-26 ENCOUNTER — OFFICE VISIT (OUTPATIENT)
Dept: INTERNAL MEDICINE CLINIC | Age: 64
End: 2024-06-26
Payer: COMMERCIAL

## 2024-06-26 VITALS
BODY MASS INDEX: 25.83 KG/M2 | HEIGHT: 65 IN | SYSTOLIC BLOOD PRESSURE: 122 MMHG | DIASTOLIC BLOOD PRESSURE: 74 MMHG | WEIGHT: 155 LBS

## 2024-06-26 DIAGNOSIS — Z00.00 ENCOUNTER FOR WELL ADULT EXAM WITHOUT ABNORMAL FINDINGS: Primary | ICD-10-CM

## 2024-06-26 DIAGNOSIS — F41.9 ANXIETY: ICD-10-CM

## 2024-06-26 DIAGNOSIS — G25.81 RESTLESS LEGS SYNDROME (RLS): ICD-10-CM

## 2024-06-26 DIAGNOSIS — Z00.00 ENCOUNTER FOR WELL ADULT EXAM WITHOUT ABNORMAL FINDINGS: ICD-10-CM

## 2024-06-26 LAB
25(OH)D3 SERPL-MCNC: 67.8 NG/ML
ALBUMIN SERPL-MCNC: 4.5 G/DL (ref 3.4–5)
ALBUMIN/GLOB SERPL: 1.3 {RATIO} (ref 1.1–2.2)
ALP SERPL-CCNC: 123 U/L (ref 40–129)
ALT SERPL-CCNC: 25 U/L (ref 10–40)
ANION GAP SERPL CALCULATED.3IONS-SCNC: 13 MMOL/L (ref 3–16)
AST SERPL-CCNC: 20 U/L (ref 15–37)
BACTERIA URNS QL MICRO: ABNORMAL /HPF
BASOPHILS # BLD: 0.1 K/UL (ref 0–0.2)
BASOPHILS NFR BLD: 1.2 %
BILIRUB SERPL-MCNC: 0.3 MG/DL (ref 0–1)
BILIRUB UR QL STRIP.AUTO: NEGATIVE
BUN SERPL-MCNC: 23 MG/DL (ref 7–20)
CALCIUM SERPL-MCNC: 10.5 MG/DL (ref 8.3–10.6)
CHLORIDE SERPL-SCNC: 105 MMOL/L (ref 99–110)
CHOLEST SERPL-MCNC: 168 MG/DL (ref 0–199)
CLARITY UR: CLEAR
CO2 SERPL-SCNC: 26 MMOL/L (ref 21–32)
COLOR UR: YELLOW
CREAT SERPL-MCNC: 0.8 MG/DL (ref 0.6–1.2)
DEPRECATED RDW RBC AUTO: 13.7 % (ref 12.4–15.4)
EOSINOPHIL # BLD: 0.2 K/UL (ref 0–0.6)
EOSINOPHIL NFR BLD: 2.9 %
EPI CELLS #/AREA URNS AUTO: 2 /HPF (ref 0–5)
FERRITIN SERPL IA-MCNC: 83.9 NG/ML (ref 15–150)
GFR SERPLBLD CREATININE-BSD FMLA CKD-EPI: 83 ML/MIN/{1.73_M2}
GLUCOSE SERPL-MCNC: 95 MG/DL (ref 70–99)
GLUCOSE UR STRIP.AUTO-MCNC: NEGATIVE MG/DL
HCT VFR BLD AUTO: 43.6 % (ref 36–48)
HDLC SERPL-MCNC: 48 MG/DL (ref 40–60)
HGB BLD-MCNC: 14.2 G/DL (ref 12–16)
HGB UR QL STRIP.AUTO: NEGATIVE
HYALINE CASTS #/AREA URNS AUTO: 0 /LPF (ref 0–8)
IRON SATN MFR SERPL: 19 % (ref 15–50)
IRON SERPL-MCNC: 68 UG/DL (ref 37–145)
KETONES UR STRIP.AUTO-MCNC: ABNORMAL MG/DL
LDL CHOLESTEROL: 86 MG/DL
LEUKOCYTE ESTERASE UR QL STRIP.AUTO: NEGATIVE
LYMPHOCYTES # BLD: 1.9 K/UL (ref 1–5.1)
LYMPHOCYTES NFR BLD: 30.4 %
MCH RBC QN AUTO: 26.6 PG (ref 26–34)
MCHC RBC AUTO-ENTMCNC: 32.7 G/DL (ref 31–36)
MCV RBC AUTO: 81.3 FL (ref 80–100)
MONOCYTES # BLD: 0.5 K/UL (ref 0–1.3)
MONOCYTES NFR BLD: 8.9 %
NEUTROPHILS # BLD: 3.5 K/UL (ref 1.7–7.7)
NEUTROPHILS NFR BLD: 56.6 %
NITRITE UR QL STRIP.AUTO: NEGATIVE
PH UR STRIP.AUTO: 5.5 [PH] (ref 5–8)
PLATELET # BLD AUTO: 377 K/UL (ref 135–450)
PMV BLD AUTO: 8.4 FL (ref 5–10.5)
POTASSIUM SERPL-SCNC: 4.8 MMOL/L (ref 3.5–5.1)
PROT SERPL-MCNC: 7.9 G/DL (ref 6.4–8.2)
PROT UR STRIP.AUTO-MCNC: NEGATIVE MG/DL
RBC # BLD AUTO: 5.36 M/UL (ref 4–5.2)
RBC CLUMPS #/AREA URNS AUTO: 2 /HPF (ref 0–4)
SODIUM SERPL-SCNC: 144 MMOL/L (ref 136–145)
SP GR UR STRIP.AUTO: 1.03 (ref 1–1.03)
TIBC SERPL-MCNC: 358 UG/DL (ref 260–445)
TRIGL SERPL-MCNC: 170 MG/DL (ref 0–150)
TSH SERPL DL<=0.005 MIU/L-ACNC: 2.18 UIU/ML (ref 0.27–4.2)
UA DIPSTICK W REFLEX MICRO PNL UR: ABNORMAL
URN SPEC COLLECT METH UR: ABNORMAL
UROBILINOGEN UR STRIP-ACNC: 0.2 E.U./DL
VLDLC SERPL CALC-MCNC: 34 MG/DL
WBC # BLD AUTO: 6.1 K/UL (ref 4–11)
WBC #/AREA URNS AUTO: 0 /HPF (ref 0–5)

## 2024-06-26 PROCEDURE — 99396 PREV VISIT EST AGE 40-64: CPT | Performed by: STUDENT IN AN ORGANIZED HEALTH CARE EDUCATION/TRAINING PROGRAM

## 2024-06-26 PROCEDURE — 3074F SYST BP LT 130 MM HG: CPT | Performed by: STUDENT IN AN ORGANIZED HEALTH CARE EDUCATION/TRAINING PROGRAM

## 2024-06-26 PROCEDURE — 3078F DIAST BP <80 MM HG: CPT | Performed by: STUDENT IN AN ORGANIZED HEALTH CARE EDUCATION/TRAINING PROGRAM

## 2024-06-26 RX ORDER — GABAPENTIN 100 MG/1
100 CAPSULE ORAL NIGHTLY
Qty: 90 CAPSULE | Refills: 0 | Status: SHIPPED | OUTPATIENT
Start: 2024-06-26 | End: 2024-09-24

## 2024-06-26 RX ORDER — CLONAZEPAM 0.5 MG/1
0.25 TABLET ORAL NIGHTLY PRN
Qty: 30 TABLET | Refills: 0 | Status: SHIPPED | OUTPATIENT
Start: 2024-06-26 | End: 2024-07-26

## 2024-06-26 ASSESSMENT — ENCOUNTER SYMPTOMS
CHEST TIGHTNESS: 0
CONSTIPATION: 0
SHORTNESS OF BREATH: 0
ABDOMINAL PAIN: 0
EYE PAIN: 0
DIARRHEA: 0
VOMITING: 0
SINUS PAIN: 0

## 2024-06-26 NOTE — PROGRESS NOTES
D) 1000 UNIT CAPS Take 1 capsule by mouth daily Yes ProviderManjula MD   Flaxseed, Linseed, (FLAXSEED OIL) 1000 MG CAPS Take  by mouth. Yes Manjula Munoz MD   Green Tea 250 MG CAPS Take  by mouth daily. Yes Manjula Munoz MD   PROBIOTIC CAPS Take  by mouth daily. Yes Provider, MD Manjula        Allergies   Allergen Reactions    Sulfa Antibiotics Rash    Codeine      VOMITING    Percocet [Oxycodone-Acetaminophen] Nausea And Vomiting       Past Medical History:   Diagnosis Date    Actinic keratosis 2010    Allergic rhinitis     Anxiety 2010    Cancer (HCC)     ovarian    Chronic idiopathic constipation 2017    Hearing loss     Hemifacial spasm     Botox    Hyperlipidemia     Hypertension     Osteoarthritis     PONV (postoperative nausea and vomiting)     Prolonged emergence from general anesthesia     Psoriasis     Psoriatic arthritis (HCC)     Restless legs     Sinusitis     TMJ dysfunction     Tooth wear     CAPS TEETH    Wears glasses        Past Surgical History:   Procedure Laterality Date    BRAIN SURGERY      for blood vessel abnormality,     BREAST REDUCTION SURGERY          CATARACT REMOVAL Bilateral     2022     SECTION      X2 89- 98    COLONOSCOPY  2013    FINGER SURGERY      Left index finger     HYSTERECTOMY (CERVIX STATUS UNKNOWN)          HYSTERECTOMY, VAGINAL      INNER EAR SURGERY      repair artery right ear    NEUROMA SURGERY      microvascular reconstruction to fix hemifacial spasm    RHINOPLASTY      SHOULDER SURGERY      left shoulder    SINUS ENDOSCOPY Bilateral 2023    BILATERAL ANTERIOR ETHMOIDECTOMY AND BILATERAL MAXILLARY ANTROSTOMY performed by Kermit Schuster MD at [facility] OR    SINUS SURGERY      AMRITA AND BSO (CERVIX REMOVED)      TOE SURGERY      TONSILLECTOMY      2014    VARICOSE VEIN SURGERY      WRIST SURGERY      right wrist, 93 & 94 FUSE, 4 MALU       Social History     Socioeconomic History

## 2024-06-26 NOTE — PATIENT INSTRUCTIONS
Behavioral strategies for restless leg syndrome include mental alerting activities, moderate regular exercise, reducing caffeine intake, soaking affected limbs, and leg massage    -cognitive behavioral therapy  -start taking 0.75 mg klonopin nightly x 1 mo

## 2024-06-26 NOTE — ASSESSMENT & PLAN NOTE
Hx of very bothersome RLS. Patient has been on klonopin which helps this symptoms as well. Per patient, she has not tried any other medication for this problem  - klonopin not indicated for tx of this problem - will begin tapering down to 0.75mg nightly  - lyrica is cost prohibitive, will try gabapentin at lowest dose 100mg 2 hrs before bed  - discussed side effects of gabapentin, patient to let me know how it is going in 1-2 wks  - iron studies ordered  - sleep hygiene information given
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
## 2024-06-27 LAB
EST. AVERAGE GLUCOSE BLD GHB EST-MCNC: 116.9 MG/DL
HBA1C MFR BLD: 5.7 %

## 2024-07-02 ENCOUNTER — PATIENT MESSAGE (OUTPATIENT)
Dept: INTERNAL MEDICINE CLINIC | Age: 64
End: 2024-07-02

## 2024-07-02 NOTE — TELEPHONE ENCOUNTER
From: Ginger Bateman  To: Dr. Esperanza Gaona  Sent: 7/2/2024 10:06 AM EDT  Subject: Test Results    Dr. Gaona:    Do you see anything of concern from my blood test results?    Thanks,  Ginger Bateman

## 2024-07-05 ENCOUNTER — PATIENT MESSAGE (OUTPATIENT)
Dept: INTERNAL MEDICINE CLINIC | Age: 64
End: 2024-07-05

## 2024-07-05 DIAGNOSIS — Z78.0 POSTMENOPAUSAL: Primary | ICD-10-CM

## 2024-07-08 NOTE — TELEPHONE ENCOUNTER
From: Ginger Bateman  To: Dr. Esperanza Gaona  Sent: 7/5/2024 10:56 AM EDT  Subject: Bone Density Scan    Dr. Gaona:    I think I might be due for a bone density scan. My records show my last one was 6/2018.     Thoughts?    Thanks,  Ginger Bateman

## 2024-07-18 ENCOUNTER — OFFICE VISIT (OUTPATIENT)
Dept: ENT CLINIC | Age: 64
End: 2024-07-18

## 2024-07-18 VITALS
TEMPERATURE: 97.5 F | HEART RATE: 69 BPM | WEIGHT: 156.6 LBS | DIASTOLIC BLOOD PRESSURE: 76 MMHG | HEIGHT: 65 IN | SYSTOLIC BLOOD PRESSURE: 114 MMHG | BODY MASS INDEX: 26.09 KG/M2

## 2024-07-18 DIAGNOSIS — J32.0 CHRONIC MAXILLARY SINUSITIS: Primary | ICD-10-CM

## 2024-07-18 NOTE — PROGRESS NOTES
Patient here for one year follow up with chronic maxillary sinusitis. Concern for sounding nasally. One or two infections over last year.     PE: Nasal cavity patent.         Due to the patients chronic sinus disease and/or history of sinonasal neoplasm for surveillance a nasal endoscopy with or without debridement will be performed to complete a significant physical examination of the patient which cannot be performed by anterior rhinoscopy alone (failure of complete examination of the paranasal sinuses). Failure to provide this procedure may lead to late detection of significant chronic benign disease, acute exacerbation, resolution or failure of early diagnosis of recurrent cancer. The procedure report is present in the body of the chart.       Nasal Endoscopy    Pre OP: chronic maxillary sinusitis  Post OP: No evidence of disease  Reason: Surveillance  Procedure: Nasal endoscopy  Surgeon: Kermit Schuster  Anesthesia: Afrin with 2% lidocaine  Estimated Blood Loss: None      After obtaining verbal consent from the patient 1% lidocaine with afrin was sprayed into the nasal cavities.  After allowing a time for anesthesia, a nasal endoscope was placed into the nostril.  The septum, inferior, and middle turbinates were examined.  The middle meatus, and sphenoethmoid recess was examined bilaterally.  Cultures were not obtained from the sinuses. There were no complications.     Pertinent positives included: There was not edema and purulence in the left middle meatus. There was not edema and purulence at the right middle meatus. Polyps were not identified in the sinuses. Masses were not identified.     Tolerated well without complication. I attest that I was present for and did the entire procedure myself.    A/P: Follow up as needed.

## 2024-08-12 LAB — MAMMOGRAPHY, EXTERNAL: NORMAL

## 2024-08-14 RX ORDER — MELOXICAM 15 MG/1
TABLET ORAL
Qty: 90 TABLET | Refills: 1 | Status: SHIPPED | OUTPATIENT
Start: 2024-08-14

## 2024-08-22 ENCOUNTER — TELEMEDICINE (OUTPATIENT)
Dept: INTERNAL MEDICINE CLINIC | Age: 64
End: 2024-08-22
Payer: COMMERCIAL

## 2024-08-22 DIAGNOSIS — G25.81 RESTLESS LEGS SYNDROME (RLS): ICD-10-CM

## 2024-08-22 DIAGNOSIS — I10 ESSENTIAL HYPERTENSION: ICD-10-CM

## 2024-08-22 DIAGNOSIS — F41.9 ANXIETY: Primary | ICD-10-CM

## 2024-08-22 PROCEDURE — G2211 COMPLEX E/M VISIT ADD ON: HCPCS | Performed by: STUDENT IN AN ORGANIZED HEALTH CARE EDUCATION/TRAINING PROGRAM

## 2024-08-22 PROCEDURE — 99214 OFFICE O/P EST MOD 30 MIN: CPT | Performed by: STUDENT IN AN ORGANIZED HEALTH CARE EDUCATION/TRAINING PROGRAM

## 2024-08-22 RX ORDER — LOSARTAN POTASSIUM 50 MG/1
50 TABLET ORAL DAILY
Qty: 90 TABLET | Refills: 1 | Status: SHIPPED | OUTPATIENT
Start: 2024-08-22
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
## 2024-08-22 NOTE — ASSESSMENT & PLAN NOTE
Patient complains of dry cough.  She has been seen by ENT, was told her sinuses are \"perfect.\"  - Stop lisinopril 20 mg daily  - Start losartan 50 mg daily  - Recommend patient take blood pressure for the first 2 weeks to monitor that it is adequately controlled on this new medication

## 2024-08-22 NOTE — ASSESSMENT & PLAN NOTE
Hx of very bothersome RLS. Patient has been on klonopin which helps this symptoms as well. Per patient, she has not tried any other medication for this problem  -Patient has been tapering down Klonopin successfully, she is taking 0.5 mg nightly  - Continue to taper slowly with 0.25 mg of Klonopin nightly  - lyrica is cost prohibitive, will try gabapentin at lowest dose 100mg 2 hrs before bed  -Continue 100 mg nightly gabapentin approximately 2 hours before bed-this new treatment is working well for patient

## 2024-08-22 NOTE — PROGRESS NOTES
2024    TELEHEALTH EVALUATION -- Audio/Visual    HPI:    Ginger Bateman (:  1960) has requested an audio/video evaluation for the following concern(s):    Patient presents for follow-up. She was last seen 2 months ago, at which time Klonopin was decreased from 1 mg to 0.75 mg nightly.  She was advised to start therapy.  She was also started on gabapentin for restless leg syndrome.    Patient is down to 0.5mg nightly of klonopin is taking 100 of gabapentin nightly 2 hours before bed. She has done very well on this regimen and has not had any side effects or residual symptoms. She feels ready to taper down further. She has been sleeping well.     She has had a 'perpetual cough' which she believes probably started when she started lisinopril. Cough is not productive. She would like to switch to a different medication to see if this helps.         Prior to Visit Medications    Medication Sig Taking? Authorizing Provider   losartan (COZAAR) 50 MG tablet Take 1 tablet by mouth daily Yes Esperanza Gaona MD   meloxicam (MOBIC) 15 MG tablet TAKE 1 TABLET DAILY AS NEEDED  Patient taking differently: Take 1 tablet by mouth daily TAKE 1 TABLET DAILY Yes Esperanza Gaona MD   clonazePAM (KLONOPIN) 0.5 MG tablet Take 0.5 tablets by mouth nightly as needed for Anxiety for up to 60 doses. Max Daily Amount: 0.25 mg Yes Esperanza Gaona MD   gabapentin (NEURONTIN) 100 MG capsule Take 1 capsule by mouth nightly for 90 days. Intended supply: 90 days Yes Esperanza Gaona MD   cetirizine (ZYRTEC) 10 MG tablet Take 1 tablet by mouth daily Yes ProviderManjula MD   linaCLOtide (LINZESS) 72 MCG CAPS capsule Take 1 capsule by mouth every morning (before breakfast) Yes Anju Tate MD   clobetasol (TEMOVATE) 0.05 % external solution Apply topically 2 times daily prn flares Yes Makeda Espinal MD   atorvastatin (LIPITOR) 20 MG tablet Take 1 tablet by mouth daily Yes Anju Tate MD   hydroCHLOROthiazide

## 2024-09-13 ENCOUNTER — TELEPHONE (OUTPATIENT)
Dept: INTERNAL MEDICINE CLINIC | Age: 64
End: 2024-09-13

## 2024-09-13 NOTE — TELEPHONE ENCOUNTER
Pt phoned per Dr Gaona's suggestion to see a Nurse Practitioner.  She cannot be seen today due to her work schedule.  Pt asked if we were open on Saturday.  Advised we are not.  She asked to be seen around or after 5pm.  Advised there would not be an appt at that time.  Suggested Urgent Care as they can swab her to also treat her.

## 2024-09-14 ENCOUNTER — PATIENT MESSAGE (OUTPATIENT)
Dept: INTERNAL MEDICINE CLINIC | Age: 64
End: 2024-09-14

## 2024-09-16 DIAGNOSIS — I10 ESSENTIAL HYPERTENSION: ICD-10-CM

## 2024-09-16 RX ORDER — LOSARTAN POTASSIUM 100 MG/1
100 TABLET ORAL DAILY
Qty: 90 TABLET | Refills: 1 | Status: SHIPPED | OUTPATIENT
Start: 2024-09-16

## 2024-09-19 ENCOUNTER — HOSPITAL ENCOUNTER (OUTPATIENT)
Dept: GENERAL RADIOLOGY | Age: 64
Discharge: HOME | End: 2024-09-19
Attending: STUDENT IN AN ORGANIZED HEALTH CARE EDUCATION/TRAINING PROGRAM
Payer: COMMERCIAL

## 2024-09-19 DIAGNOSIS — Z78.0 POSTMENOPAUSAL: ICD-10-CM

## 2024-09-19 PROCEDURE — 77080 DXA BONE DENSITY AXIAL: CPT

## 2024-09-21 ENCOUNTER — PATIENT MESSAGE (OUTPATIENT)
Dept: INTERNAL MEDICINE CLINIC | Age: 64
End: 2024-09-21

## 2024-09-21 DIAGNOSIS — G25.81 RESTLESS LEGS SYNDROME (RLS): ICD-10-CM

## 2024-09-23 RX ORDER — GABAPENTIN 100 MG/1
100 CAPSULE ORAL NIGHTLY
Qty: 90 CAPSULE | Refills: 1 | Status: SHIPPED | OUTPATIENT
Start: 2024-09-23 | End: 2025-03-22

## 2024-09-23 RX ORDER — GABAPENTIN 100 MG/1
100 CAPSULE ORAL NIGHTLY
Qty: 90 CAPSULE | Refills: 0 | OUTPATIENT
Start: 2024-09-23

## 2024-09-24 ENCOUNTER — OFFICE VISIT (OUTPATIENT)
Dept: FAMILY MEDICINE CLINIC | Age: 64
End: 2024-09-24
Payer: COMMERCIAL

## 2024-09-24 ENCOUNTER — PATIENT MESSAGE (OUTPATIENT)
Dept: FAMILY MEDICINE CLINIC | Age: 64
End: 2024-09-24

## 2024-09-24 VITALS
OXYGEN SATURATION: 97 % | SYSTOLIC BLOOD PRESSURE: 118 MMHG | DIASTOLIC BLOOD PRESSURE: 82 MMHG | TEMPERATURE: 98.4 F | HEART RATE: 75 BPM | WEIGHT: 157 LBS | HEIGHT: 65 IN | BODY MASS INDEX: 26.16 KG/M2

## 2024-09-24 DIAGNOSIS — J01.90 ACUTE BACTERIAL SINUSITIS: Primary | ICD-10-CM

## 2024-09-24 DIAGNOSIS — B96.89 ACUTE BACTERIAL SINUSITIS: Primary | ICD-10-CM

## 2024-09-24 DIAGNOSIS — L40.50 PSORIATIC ARTHRITIS (HCC): ICD-10-CM

## 2024-09-24 DIAGNOSIS — D32.9 MENINGIOMA (HCC): ICD-10-CM

## 2024-09-24 PROCEDURE — 3079F DIAST BP 80-89 MM HG: CPT | Performed by: NURSE PRACTITIONER

## 2024-09-24 PROCEDURE — 3074F SYST BP LT 130 MM HG: CPT | Performed by: NURSE PRACTITIONER

## 2024-09-24 PROCEDURE — 99214 OFFICE O/P EST MOD 30 MIN: CPT | Performed by: NURSE PRACTITIONER

## 2024-09-24 RX ORDER — METHYLPREDNISOLONE 4 MG
TABLET, DOSE PACK ORAL
Qty: 1 KIT | Refills: 0 | Status: SHIPPED | OUTPATIENT
Start: 2024-09-24 | End: 2024-09-30

## 2024-09-24 RX ORDER — DOXYCYCLINE HYCLATE 100 MG
100 TABLET ORAL 2 TIMES DAILY
Qty: 14 TABLET | Refills: 0 | Status: SHIPPED | OUTPATIENT
Start: 2024-09-24 | End: 2024-10-01

## 2024-09-24 ASSESSMENT — ENCOUNTER SYMPTOMS
EYE ITCHING: 0
ABDOMINAL PAIN: 0
DIARRHEA: 0
WHEEZING: 0
EYE REDNESS: 0
BLOOD IN STOOL: 0
COLOR CHANGE: 0
SINUS PRESSURE: 1
BACK PAIN: 0
VOMITING: 0
CHEST TIGHTNESS: 0
CONSTIPATION: 0
SINUS PAIN: 1
NAUSEA: 0
SHORTNESS OF BREATH: 0
COUGH: 1
RHINORRHEA: 0
SORE THROAT: 0

## 2024-09-25 RX ORDER — FLUCONAZOLE 150 MG/1
150 TABLET ORAL
Qty: 2 TABLET | Refills: 0 | Status: SHIPPED | OUTPATIENT
Start: 2024-09-25 | End: 2024-10-01

## 2024-10-14 RX ORDER — CLOBETASOL PROPIONATE 0.5 MG/ML
SOLUTION TOPICAL
Qty: 50 ML | Refills: 0 | Status: SHIPPED | OUTPATIENT
Start: 2024-10-14

## 2024-10-17 ENCOUNTER — PATIENT MESSAGE (OUTPATIENT)
Dept: INTERNAL MEDICINE CLINIC | Age: 64
End: 2024-10-17

## 2024-10-17 RX ORDER — LISINOPRIL 20 MG/1
20 TABLET ORAL DAILY
Qty: 90 TABLET | Refills: 0 | Status: SHIPPED | OUTPATIENT
Start: 2024-10-17

## 2024-10-22 ENCOUNTER — OFFICE VISIT (OUTPATIENT)
Dept: ENT CLINIC | Age: 64
End: 2024-10-22
Payer: COMMERCIAL

## 2024-10-22 VITALS — DIASTOLIC BLOOD PRESSURE: 86 MMHG | TEMPERATURE: 97.5 F | SYSTOLIC BLOOD PRESSURE: 149 MMHG | HEART RATE: 71 BPM

## 2024-10-22 DIAGNOSIS — J30.2 SEASONAL ALLERGIES: ICD-10-CM

## 2024-10-22 DIAGNOSIS — J01.00 SUBACUTE MAXILLARY SINUSITIS: Primary | ICD-10-CM

## 2024-10-22 DIAGNOSIS — J32.4 CHRONIC PANSINUSITIS: ICD-10-CM

## 2024-10-22 PROCEDURE — 99214 OFFICE O/P EST MOD 30 MIN: CPT | Performed by: OTOLARYNGOLOGY

## 2024-10-22 PROCEDURE — 3079F DIAST BP 80-89 MM HG: CPT | Performed by: OTOLARYNGOLOGY

## 2024-10-22 PROCEDURE — 3077F SYST BP >= 140 MM HG: CPT | Performed by: OTOLARYNGOLOGY

## 2024-10-22 RX ORDER — LEVOFLOXACIN 500 MG/1
500 TABLET, FILM COATED ORAL DAILY
Qty: 10 TABLET | Refills: 0 | Status: SHIPPED | OUTPATIENT
Start: 2024-10-22 | End: 2024-11-01

## 2024-10-22 RX ORDER — PREDNISONE 10 MG/1
40 TABLET ORAL DAILY
Qty: 20 TABLET | Refills: 0 | Status: SHIPPED | OUTPATIENT
Start: 2024-10-22 | End: 2024-10-27

## 2024-10-22 RX ORDER — FLUCONAZOLE 150 MG/1
150 TABLET ORAL
Qty: 2 TABLET | Refills: 0 | Status: SHIPPED | OUTPATIENT
Start: 2024-10-22 | End: 2024-10-28

## 2024-10-22 ASSESSMENT — ENCOUNTER SYMPTOMS
NAUSEA: 0
SORE THROAT: 0
COUGH: 0
FACIAL SWELLING: 0
SINUS PRESSURE: 0
CHOKING: 0
SHORTNESS OF BREATH: 0
DIARRHEA: 0
TROUBLE SWALLOWING: 0
SINUS PAIN: 0
EYE PAIN: 0
EYE REDNESS: 0
EYE ITCHING: 0
VOICE CHANGE: 0
RHINORRHEA: 0

## 2024-11-11 ENCOUNTER — OFFICE VISIT (OUTPATIENT)
Dept: DERMATOLOGY | Age: 64
End: 2024-11-11

## 2024-11-11 DIAGNOSIS — Z87.2 HISTORY OF ACTINIC KERATOSES: ICD-10-CM

## 2024-11-11 DIAGNOSIS — L82.1 SEBORRHEIC KERATOSIS: ICD-10-CM

## 2024-11-11 DIAGNOSIS — D48.5 NEOPLASM OF UNCERTAIN BEHAVIOR OF SKIN: ICD-10-CM

## 2024-11-11 DIAGNOSIS — Z86.018 HISTORY OF DYSPLASTIC NEVUS: ICD-10-CM

## 2024-11-11 DIAGNOSIS — L40.9 PSORIASIS: ICD-10-CM

## 2024-11-11 DIAGNOSIS — L91.8 INFLAMED SKIN TAG: ICD-10-CM

## 2024-11-11 DIAGNOSIS — D18.01 CHERRY ANGIOMA: ICD-10-CM

## 2024-11-11 DIAGNOSIS — D22.9 MULTIPLE NEVI: Primary | ICD-10-CM

## 2024-11-11 NOTE — PROGRESS NOTES
2022 - have discussed could be BCC but less likely - seems to be punctum not gland and not really visible today   - has been monitored w  eye appt annually too - Dr. Velma Mcnally ([facility], oculoplastics)  - cont monitoring for now    6. Angioma - R frontal scalp - better - consider rpt Vbeam    7. [facility] - ala - ED if desired  - reassured re benign nature     8. Inflamed skin tag - L axilla and infraorbital  - 2 snip removal performed after verbal consent obtained. Patient educated regarding risk of bleeding, infection, scar and educated on wound care.   Skin cleansed with alcohol pad and site anesthetized with lido + epi.    Aluminum chloride applied to site for hemostasis.  Petrolatum ointment and bandage applied.    Follow-up 6 to 12 months.  Can f/u for laser in the fall if desired - chest.

## 2024-11-12 ENCOUNTER — PATIENT MESSAGE (OUTPATIENT)
Dept: INTERNAL MEDICINE CLINIC | Age: 64
End: 2024-11-12

## 2024-11-13 RX ORDER — CLOBETASOL PROPIONATE 0.5 MG/ML
SOLUTION TOPICAL
Qty: 50 ML | Refills: 4 | Status: SHIPPED | OUTPATIENT
Start: 2024-11-13

## 2024-11-15 ENCOUNTER — PATIENT MESSAGE (OUTPATIENT)
Dept: INTERNAL MEDICINE CLINIC | Age: 64
End: 2024-11-15

## 2024-11-15 RX ORDER — LUBIPROSTONE 8 UG/1
8 CAPSULE ORAL 2 TIMES DAILY WITH MEALS
Qty: 180 CAPSULE | Refills: 1 | Status: SHIPPED | OUTPATIENT
Start: 2024-11-15

## 2024-11-15 NOTE — TELEPHONE ENCOUNTER
We can try it.  The dosing is 8 mg twice daily to be taken with food.  Some side effects to look out for would be low blood pressure, elevated heart rate, shortness of breath, nausea.

## 2024-11-18 ENCOUNTER — TELEPHONE (OUTPATIENT)
Dept: INTERNAL MEDICINE CLINIC | Age: 64
End: 2024-11-18

## 2024-11-19 ENCOUNTER — PATIENT MESSAGE (OUTPATIENT)
Dept: INTERNAL MEDICINE CLINIC | Age: 64
End: 2024-11-19

## 2024-11-19 RX ORDER — HYDROCHLOROTHIAZIDE 25 MG/1
25 TABLET ORAL EVERY MORNING
Qty: 90 TABLET | Refills: 1 | Status: CANCELLED | OUTPATIENT
Start: 2024-11-19

## 2024-11-20 RX ORDER — HYDROCHLOROTHIAZIDE 25 MG/1
25 TABLET ORAL EVERY MORNING
Qty: 90 TABLET | Refills: 1 | Status: SHIPPED | OUTPATIENT
Start: 2024-11-20

## 2024-11-20 NOTE — TELEPHONE ENCOUNTER
Submitted PA for Lubiprostone 8MCG capsules   [facility]ia [facility] key [id number] STATUS: PENDING.    Follow up done daily; if no decision with in three days we will refax.  If another three days goes by with no decision will call the insurance for status.

## 2024-12-12 ENCOUNTER — OFFICE VISIT (OUTPATIENT)
Dept: INTERNAL MEDICINE CLINIC | Age: 64
End: 2024-12-12

## 2024-12-12 VITALS
BODY MASS INDEX: 25.99 KG/M2 | SYSTOLIC BLOOD PRESSURE: 128 MMHG | HEIGHT: 65 IN | WEIGHT: 156 LBS | DIASTOLIC BLOOD PRESSURE: 70 MMHG

## 2024-12-12 DIAGNOSIS — I10 ESSENTIAL HYPERTENSION: Primary | ICD-10-CM

## 2024-12-12 DIAGNOSIS — Z87.19 HISTORY OF DIVERTICULITIS: ICD-10-CM

## 2024-12-12 DIAGNOSIS — Z00.00 ANNUAL PHYSICAL EXAM: ICD-10-CM

## 2024-12-12 DIAGNOSIS — F41.9 ANXIETY: ICD-10-CM

## 2024-12-12 PROBLEM — B96.89 ACUTE BACTERIAL SINUSITIS: Status: RESOLVED | Noted: 2024-09-24 | Resolved: 2024-12-12

## 2024-12-12 PROBLEM — J01.90 ACUTE BACTERIAL SINUSITIS: Status: RESOLVED | Noted: 2024-09-24 | Resolved: 2024-12-12

## 2024-12-12 PROBLEM — J32.0 MAXILLARY SINUSITIS, CHRONIC: Status: RESOLVED | Noted: 2023-04-28 | Resolved: 2024-12-12

## 2024-12-12 RX ORDER — LISINOPRIL 30 MG/1
30 TABLET ORAL DAILY
Qty: 90 TABLET | Refills: 1 | Status: SHIPPED | OUTPATIENT
Start: 2024-12-12 | End: 2025-06-10

## 2024-12-12 NOTE — ASSESSMENT & PLAN NOTE
She had diverticulitis c/b bowel perforation in March 2024. She avoids anything will little seeds and beef. Hasn't had any recurrence.

## 2024-12-12 NOTE — PATIENT INSTRUCTIONS
Arnav Lovell - guided meditation for sleep  ------------------  - CBT-I  -Sleep as long as necessary to feel rested (usually seven to eight hours for adults) and then get out of bed   -Maintain a regular sleep schedule, particularly a regular wake-up time in the morning   -Try not to force sleep   -Avoid caffeinated beverages after lunch   -Avoid alcohol near bedtime (eg, late afternoon and evening)   -Avoid smoking or other nicotine intake, particularly during the evening   -Adjust the bedroom environment as needed to decrease stimuli (eg, reduce ambient light, turn off the television or radio)   -Avoid prolonged use of light-emitting screens (laptops, tablets, smartphones, ebooks) before bedtime   -Resolve concerns or worries before bedtime   -Exercise regularly for at least 20 minutes, preferably more than four to five hours prior to bedtime   -Avoid daytime naps, especially if they are longer than 20 to 30 minutes or occur late in the day   -Relaxation techniques like background sounds (white noise, fan, waves, rain and thunders), guided meditation for sleep, breathing exercises   -can try melatonin 1mg (studies show 1 mg works better than higher doses)

## 2024-12-12 NOTE — PROGRESS NOTES
Paying Living Expenses: Not hard at all   Food Insecurity: No Food Insecurity (6/6/2024)    Hunger Vital Sign     Worried About Running Out of Food in the Last Year: Never true     Ran Out of Food in the Last Year: Never true   Transportation Needs: Unknown (6/6/2024)    PRAPARE - Transportation     Lack of Transportation (Medical): Not on file     Lack of Transportation (Non-Medical): No   Physical Activity: Not on file   Stress: Not on file   Social Connections: Not on file   Intimate Partner Violence: Not on file   Housing Stability: Unknown (6/6/2024)    Housing Stability Vital Sign     Unable to Pay for Housing in the Last Year: Not on file     Number of Places Lived in the Last Year: Not on file     Unstable Housing in the Last Year: No        Family History   Problem Relation Age of Onset    Heart Disease Father         chf    High Blood Pressure Father     High Cholesterol Father     Diabetes Father     Kidney Disease Father     High Blood Pressure Brother     Depression Brother     Diabetes Brother     Cancer Paternal Grandmother     Stroke Paternal Grandmother     Stroke Paternal Grandfather     Cancer Mother         skin    Cancer Sister         breast       Vitals:    12/12/24 0838   BP: 128/70   Site: Left Upper Arm   Weight: 70.8 kg (156 lb)   Height: 1.651 m (5' 5\")     Estimated body mass index is 25.96 kg/m² as calculated from the following:    Height as of this encounter: 1.651 m (5' 5\").    Weight as of this encounter: 70.8 kg (156 lb).    Health Maintenance Due   Topic Date Due    COVID-19 Vaccine (5 - 2023-24 season) 09/01/2024        Physical Exam  Constitutional:       Appearance: Normal appearance.   HENT:      Head: Normocephalic.      Mouth/Throat:      Mouth: Mucous membranes are moist.   Eyes:      General: No scleral icterus.     Extraocular Movements: Extraocular movements intact.      Conjunctiva/sclera: Conjunctivae normal.   Cardiovascular:      Rate and Rhythm: Normal rate and

## 2024-12-13 DIAGNOSIS — G25.81 RESTLESS LEGS SYNDROME (RLS): ICD-10-CM

## 2024-12-13 NOTE — TELEPHONE ENCOUNTER
Last appointment: 12/12/2024  Next appointment: 6/26/2025  Last refill:  2 months ago (9/23/2024) by Esperanza Gaona MD   Requested Prescriptions     Pending Prescriptions Disp Refills    gabapentin (NEURONTIN) 100 MG capsule 90 capsule 1     Sig: Take 1 capsule by mouth nightly for 180 days. Intended supply: 90 days

## 2024-12-14 RX ORDER — GABAPENTIN 100 MG/1
100 CAPSULE ORAL NIGHTLY
Qty: 90 CAPSULE | Refills: 1 | Status: SHIPPED | OUTPATIENT
Start: 2024-12-14 | End: 2025-06-12

## 2025-02-03 ENCOUNTER — PATIENT MESSAGE (OUTPATIENT)
Dept: INTERNAL MEDICINE CLINIC | Age: 65
End: 2025-02-03

## 2025-02-28 ENCOUNTER — TELEMEDICINE (OUTPATIENT)
Dept: INTERNAL MEDICINE CLINIC | Age: 65
End: 2025-02-28

## 2025-02-28 DIAGNOSIS — R79.89 ABNORMAL CORTISOL LEVEL: ICD-10-CM

## 2025-02-28 DIAGNOSIS — L40.50 PSORIATIC ARTHRITIS (HCC): ICD-10-CM

## 2025-02-28 DIAGNOSIS — E79.0 ELEVATED URIC ACID IN BLOOD: ICD-10-CM

## 2025-02-28 DIAGNOSIS — R73.09 ELEVATED HEMOGLOBIN A1C: ICD-10-CM

## 2025-02-28 DIAGNOSIS — E53.8 VITAMIN B12 DEFICIENCY: ICD-10-CM

## 2025-02-28 DIAGNOSIS — G25.81 RESTLESS LEGS SYNDROME (RLS): ICD-10-CM

## 2025-02-28 DIAGNOSIS — F41.9 ANXIETY: ICD-10-CM

## 2025-02-28 DIAGNOSIS — R79.89 ABNORMAL C-REACTIVE PROTEIN: Primary | ICD-10-CM

## 2025-02-28 RX ORDER — CLONAZEPAM 0.5 MG/1
0.25 TABLET ORAL NIGHTLY PRN
Qty: 45 TABLET | Refills: 0 | Status: SHIPPED | OUTPATIENT
Start: 2025-02-28 | End: 2025-05-29

## 2025-02-28 RX ORDER — SAME BUTANEDISULFONATE/BETAINE 400-600 MG
1 POWDER IN PACKET (EA) ORAL NIGHTLY
COMMUNITY

## 2025-02-28 SDOH — ECONOMIC STABILITY: FOOD INSECURITY: WITHIN THE PAST 12 MONTHS, THE FOOD YOU BOUGHT JUST DIDN'T LAST AND YOU DIDN'T HAVE MONEY TO GET MORE.: NEVER TRUE

## 2025-02-28 SDOH — ECONOMIC STABILITY: FOOD INSECURITY: WITHIN THE PAST 12 MONTHS, YOU WORRIED THAT YOUR FOOD WOULD RUN OUT BEFORE YOU GOT MONEY TO BUY MORE.: NEVER TRUE

## 2025-02-28 ASSESSMENT — PATIENT HEALTH QUESTIONNAIRE - PHQ9
SUM OF ALL RESPONSES TO PHQ QUESTIONS 1-9: 0
2. FEELING DOWN, DEPRESSED OR HOPELESS: NOT AT ALL
SUM OF ALL RESPONSES TO PHQ9 QUESTIONS 1 & 2: 0
SUM OF ALL RESPONSES TO PHQ QUESTIONS 1-9: 0
1. LITTLE INTEREST OR PLEASURE IN DOING THINGS: NOT AT ALL

## 2025-02-28 NOTE — ASSESSMENT & PLAN NOTE
Hx of very bothersome RLS. Patient has been on klonopin which helps this symptoms as well. Per patient, she has not tried any other medication for this problem  -Patient has been tapering down Klonopin successfully, she is taking 0.5 mg nightly  - Continue to taper slowly with 0.25 mg of Klonopin nightly  - lyrica is cost prohibitive, tried gabapentin but this did not help
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
## 2025-04-14 ENCOUNTER — TELEPHONE (OUTPATIENT)
Dept: ENT CLINIC | Age: 65
End: 2025-04-14

## 2025-04-14 DIAGNOSIS — J01.00 SUBACUTE MAXILLARY SINUSITIS: ICD-10-CM

## 2025-04-14 RX ORDER — LEVOFLOXACIN 500 MG/1
500 TABLET, FILM COATED ORAL DAILY
Qty: 10 TABLET | Refills: 0 | Status: SHIPPED | OUTPATIENT
Start: 2025-04-14 | End: 2025-04-24

## 2025-04-14 RX ORDER — FLUCONAZOLE 150 MG/1
150 TABLET ORAL
Qty: 2 TABLET | Refills: 0 | Status: SHIPPED | OUTPATIENT
Start: 2025-04-14 | End: 2025-04-20

## 2025-04-14 RX ORDER — PREDNISONE 10 MG/1
40 TABLET ORAL DAILY
Qty: 20 TABLET | Refills: 0 | Status: SHIPPED | OUTPATIENT
Start: 2025-04-14 | End: 2025-04-19

## 2025-04-15 ENCOUNTER — TELEPHONE (OUTPATIENT)
Dept: ENT CLINIC | Age: 65
End: 2025-04-15

## 2025-05-02 RX ORDER — HYDROCHLOROTHIAZIDE 25 MG/1
25 TABLET ORAL EVERY MORNING
Qty: 90 TABLET | Refills: 1 | Status: SHIPPED | OUTPATIENT
Start: 2025-05-02
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
## 2025-05-14 NOTE — PROGRESS NOTES
[facility]  Makeda Espinal MD  [phone]      Ginger Bateman  1960    64 y.o. female     Date of Visit: 5/15/2025    Last seen:  for FSE and  for laser (lentigines)    Chief Complaint: moles/lesions, f/u psoriasis  Chief Complaint   Patient presents with    Skin Exam    Laser Treatment     History of Present Illness:  *went to a [facility] in [place] in 2022    History of Present Illness  The patient, a 64-year-old female, presents for evaluation of nevi, follow-up of actinic keratosis, and management of psoriasis currently treated with Cosentyx.    1. Mult nevi - here for full skin check; no new concerns or changing lesions.    2. F/u for AKs - no new concerns or probs with previous sites.  3. Hx of psoriasis, mainly scalp (on Cosentyx - changed from Enbrel since last seen for PsA arthritis and now on infusion) - sees Dr. Odom ; fairly well-controlled with clobex spray/solution as needed flares on the scalp + sorilux and occasional dermasmoothe and clobetasol oint prn flares elsewhere.  She has been flaring intermittently - mainly on the occipital scalp and a few lesions on the distal LE's at times.    Flares seem to be seasonal and worse in the spring.    Added Sorilux foam previously for scalp flares and this has helped some.  Better overall since change to Cosentyx (by rheum).    4.  S/p bx of lesion on the left upper back at past visit - read as mod dysplastic nevus.  Had recurrence of pigment here last visit in October 2020 so repeat biopsy was done and read as junctional recurrence of a previous biopsy nevus, no atypia seen.    5. She has a subtle dimple on the R lower eyelid at the lash line - noticed in fall 2022.  Asx.  Has had evaluation with her eye doctor/oculoplastics and reports no concerns and only monitoring for now.  Has been seeing eye doctor as well and no concerns.  Asymptomatic.  Unchanged.    6. S/p Vbeam - for angioma - R frontal scalp - at past

## 2025-05-15 ENCOUNTER — PROCEDURE VISIT (OUTPATIENT)
Age: 65
End: 2025-05-15

## 2025-05-15 DIAGNOSIS — L40.9 PSORIASIS: ICD-10-CM

## 2025-05-15 DIAGNOSIS — L91.8 INFLAMED SKIN TAG: ICD-10-CM

## 2025-05-15 DIAGNOSIS — L81.4 LENTIGINES: ICD-10-CM

## 2025-05-15 DIAGNOSIS — Z86.018 HISTORY OF DYSPLASTIC NEVUS: ICD-10-CM

## 2025-05-15 DIAGNOSIS — D18.01 CHERRY ANGIOMA: ICD-10-CM

## 2025-05-15 DIAGNOSIS — D22.9 MULTIPLE NEVI: Primary | ICD-10-CM

## 2025-05-15 DIAGNOSIS — D48.5 NEOPLASM OF UNCERTAIN BEHAVIOR OF SKIN: ICD-10-CM

## 2025-05-15 DIAGNOSIS — Z87.2 HISTORY OF ACTINIC KERATOSES: ICD-10-CM

## 2025-05-15 DIAGNOSIS — L82.1 SEBORRHEIC KERATOSIS: ICD-10-CM

## 2025-05-19 DIAGNOSIS — E78.2 MIXED HYPERLIPIDEMIA: ICD-10-CM

## 2025-05-19 RX ORDER — ATORVASTATIN CALCIUM 20 MG/1
20 TABLET, FILM COATED ORAL DAILY
Qty: 90 TABLET | Refills: 3 | Status: SHIPPED | OUTPATIENT
Start: 2025-05-19 | End: 2025-05-20 | Stop reason: SDUPTHER

## 2025-05-19 NOTE — TELEPHONE ENCOUNTER
Pts pharmacy is requesting a refill on medication below for the pt:      atorvastatin (LIPITOR) 20 MG tablet [[id number]]     [id number] [place] [id number] - P [phone] - F [phone]

## 2025-05-20 ENCOUNTER — PATIENT MESSAGE (OUTPATIENT)
Dept: INTERNAL MEDICINE CLINIC | Age: 65
End: 2025-05-20

## 2025-05-20 DIAGNOSIS — E78.2 MIXED HYPERLIPIDEMIA: ICD-10-CM

## 2025-05-20 RX ORDER — ATORVASTATIN CALCIUM 20 MG/1
20 TABLET, FILM COATED ORAL DAILY
Qty: 90 TABLET | Refills: 3 | Status: SHIPPED | OUTPATIENT
Start: 2025-05-20

## 2025-06-03 DIAGNOSIS — I10 ESSENTIAL HYPERTENSION: ICD-10-CM

## 2025-06-03 RX ORDER — LISINOPRIL 30 MG/1
30 TABLET ORAL DAILY
Qty: 90 TABLET | Refills: 0 | Status: SHIPPED | OUTPATIENT
Start: 2025-06-03 | End: 2025-11-30

## 2025-06-03 NOTE — TELEPHONE ENCOUNTER
Last appointment: 2/28/2025  Next appointment: Visit date not found    New pt apt with ROSALBA Iraheta on 6/26/25    Last refill: : (12/12/2024) by Esperanza Gaona MD

## 2025-06-12 DIAGNOSIS — Z00.00 ANNUAL PHYSICAL EXAM: ICD-10-CM

## 2025-06-12 LAB
25(OH)D3 SERPL-MCNC: 50.1 NG/ML
ALBUMIN SERPL-MCNC: 4.3 G/DL (ref 3.4–5)
ALBUMIN/GLOB SERPL: 1.9 {RATIO} (ref 1.1–2.2)
ALP SERPL-CCNC: 95 U/L (ref 40–129)
ALT SERPL-CCNC: 36 U/L (ref 10–40)
ANION GAP SERPL CALCULATED.3IONS-SCNC: 11 MMOL/L (ref 3–16)
AST SERPL-CCNC: 29 U/L (ref 15–37)
BACTERIA URNS QL MICRO: ABNORMAL /HPF
BASOPHILS # BLD: 0 K/UL (ref 0–0.2)
BASOPHILS NFR BLD: 0.8 %
BILIRUB SERPL-MCNC: 0.3 MG/DL (ref 0–1)
BILIRUB UR QL STRIP.AUTO: NEGATIVE
BUN SERPL-MCNC: 24 MG/DL (ref 7–20)
CALCIUM SERPL-MCNC: 9.9 MG/DL (ref 8.3–10.6)
CHLORIDE SERPL-SCNC: 101 MMOL/L (ref 99–110)
CHOLEST SERPL-MCNC: 170 MG/DL (ref 0–199)
CLARITY UR: CLEAR
CO2 SERPL-SCNC: 28 MMOL/L (ref 21–32)
COLOR UR: YELLOW
CREAT SERPL-MCNC: 0.8 MG/DL (ref 0.6–1.2)
DEPRECATED RDW RBC AUTO: 13.4 % (ref 12.4–15.4)
EOSINOPHIL # BLD: 0.1 K/UL (ref 0–0.6)
EOSINOPHIL NFR BLD: 1.7 %
EPI CELLS #/AREA URNS AUTO: 7 /HPF (ref 0–5)
EST. AVERAGE GLUCOSE BLD GHB EST-MCNC: 116.9 MG/DL
GFR SERPLBLD CREATININE-BSD FMLA CKD-EPI: 83 ML/MIN/{1.73_M2}
GLUCOSE SERPL-MCNC: 93 MG/DL (ref 70–99)
GLUCOSE UR STRIP.AUTO-MCNC: NEGATIVE MG/DL
HBA1C MFR BLD: 5.7 %
HCT VFR BLD AUTO: 43.8 % (ref 36–48)
HDLC SERPL-MCNC: 43 MG/DL (ref 40–60)
HGB BLD-MCNC: 14.2 G/DL (ref 12–16)
HGB UR QL STRIP.AUTO: NEGATIVE
HYALINE CASTS #/AREA URNS AUTO: 0 /LPF (ref 0–8)
KETONES UR STRIP.AUTO-MCNC: NEGATIVE MG/DL
LDLC SERPL CALC-MCNC: 84 MG/DL
LEUKOCYTE ESTERASE UR QL STRIP.AUTO: NEGATIVE
LYMPHOCYTES # BLD: 1.6 K/UL (ref 1–5.1)
LYMPHOCYTES NFR BLD: 30.8 %
MCH RBC QN AUTO: 26.5 PG (ref 26–34)
MCHC RBC AUTO-ENTMCNC: 32.6 G/DL (ref 31–36)
MCV RBC AUTO: 81.4 FL (ref 80–100)
MONOCYTES # BLD: 0.4 K/UL (ref 0–1.3)
MONOCYTES NFR BLD: 7.1 %
NEUTROPHILS # BLD: 3.2 K/UL (ref 1.7–7.7)
NEUTROPHILS NFR BLD: 59.6 %
NITRITE UR QL STRIP.AUTO: NEGATIVE
PH UR STRIP.AUTO: 5.5 [PH] (ref 5–8)
PLATELET # BLD AUTO: 332 K/UL (ref 135–450)
PMV BLD AUTO: 8.7 FL (ref 5–10.5)
POTASSIUM SERPL-SCNC: 4.3 MMOL/L (ref 3.5–5.1)
PROT SERPL-MCNC: 6.6 G/DL (ref 6.4–8.2)
PROT UR STRIP.AUTO-MCNC: NEGATIVE MG/DL
RBC # BLD AUTO: 5.38 M/UL (ref 4–5.2)
RBC CLUMPS #/AREA URNS AUTO: 1 /HPF (ref 0–4)
SODIUM SERPL-SCNC: 140 MMOL/L (ref 136–145)
SP GR UR STRIP.AUTO: 1.02 (ref 1–1.03)
TRIGL SERPL-MCNC: 214 MG/DL (ref 0–150)
TSH SERPL DL<=0.005 MIU/L-ACNC: 3.71 UIU/ML (ref 0.27–4.2)
UA DIPSTICK W REFLEX MICRO PNL UR: ABNORMAL
URN SPEC COLLECT METH UR: ABNORMAL
UROBILINOGEN UR STRIP-ACNC: 0.2 E.U./DL
VLDLC SERPL CALC-MCNC: 43 MG/DL
WBC # BLD AUTO: 5.3 K/UL (ref 4–11)
WBC #/AREA URNS AUTO: 1 /HPF (ref 0–5)

## 2025-06-13 ENCOUNTER — PATIENT MESSAGE (OUTPATIENT)
Dept: INTERNAL MEDICINE CLINIC | Age: 65
End: 2025-06-13

## 2025-06-26 ENCOUNTER — HOSPITAL ENCOUNTER (OUTPATIENT)
Dept: GENERAL RADIOLOGY | Age: 65
Discharge: HOME | End: 2025-06-26
Payer: COMMERCIAL

## 2025-06-26 ENCOUNTER — RESULTS FOLLOW-UP (OUTPATIENT)
Dept: FAMILY MEDICINE CLINIC | Age: 65
End: 2025-06-26

## 2025-06-26 ENCOUNTER — OFFICE VISIT (OUTPATIENT)
Dept: FAMILY MEDICINE CLINIC | Age: 65
End: 2025-06-26

## 2025-06-26 VITALS
OXYGEN SATURATION: 98 % | WEIGHT: 157.8 LBS | HEIGHT: 64 IN | SYSTOLIC BLOOD PRESSURE: 112 MMHG | HEART RATE: 72 BPM | BODY MASS INDEX: 26.94 KG/M2 | RESPIRATION RATE: 16 BRPM | DIASTOLIC BLOOD PRESSURE: 70 MMHG | TEMPERATURE: 96.9 F

## 2025-06-26 DIAGNOSIS — G25.81 RESTLESS LEGS SYNDROME (RLS): ICD-10-CM

## 2025-06-26 DIAGNOSIS — D35.2 PITUITARY ADENOMA (HCC): ICD-10-CM

## 2025-06-26 DIAGNOSIS — M25.512 CHRONIC LEFT SHOULDER PAIN: ICD-10-CM

## 2025-06-26 DIAGNOSIS — F41.9 ANXIETY: ICD-10-CM

## 2025-06-26 DIAGNOSIS — D32.9 MENINGIOMA (HCC): ICD-10-CM

## 2025-06-26 DIAGNOSIS — M25.512 CHRONIC LEFT SHOULDER PAIN: Primary | ICD-10-CM

## 2025-06-26 DIAGNOSIS — G89.29 CHRONIC LEFT SHOULDER PAIN: ICD-10-CM

## 2025-06-26 DIAGNOSIS — E78.2 MIXED HYPERLIPIDEMIA: ICD-10-CM

## 2025-06-26 DIAGNOSIS — E55.9 VITAMIN D DEFICIENCY: ICD-10-CM

## 2025-06-26 DIAGNOSIS — L40.50 PSORIATIC ARTHRITIS (HCC): ICD-10-CM

## 2025-06-26 DIAGNOSIS — G89.29 CHRONIC LEFT SHOULDER PAIN: Primary | ICD-10-CM

## 2025-06-26 DIAGNOSIS — Z85.43 PERSONAL HISTORY OF MALIGNANT NEOPLASM OF OVARY: ICD-10-CM

## 2025-06-26 PROCEDURE — 73030 X-RAY EXAM OF SHOULDER: CPT

## 2025-06-26 NOTE — PROGRESS NOTES
Ginger Bateman (:  1960) is a 64 y.o. female,Established patient, here for evaluation of the following chief complaint(s):  Established New Doctor      ASSESSMENT/PLAN:  1. Chronic left shoulder pain  -     XR SHOULDER LEFT (MIN 2 VIEWS); Future  2. Mixed hyperlipidemia  -     CT CARDIAC CALCIUM SCORING; Future  3. Meningioma (HCC)  4. Psoriatic arthritis (HCC)  5. Pituitary adenoma (HCC)  6. Vitamin D deficiency  7. Personal history of malignant neoplasm of ovary  8. Anxiety  9. Restless legs syndrome (RLS)      Assessment & Plan  1. Left shoulder pain.  - Chronic left shoulder pain for the past year without specific trauma or injury.  - Pain likely exacerbated by activities involving heavy lifting and horse riding.  - Physical exam findings suggest the need for further evaluation.  - An x-ray of the left shoulder will be ordered to evaluate for potential osteoarthritis, joint effusion, or tendon damage.    2. Anxiety.  - Long-standing history of anxiety managed with Klonopin 0.5 mg at bedtime for the past 20 years.  - Klonopin also helps with restless legs syndrome.  - Current dosage appears effective, and the patient prefers to continue this regimen rather than switching to gabapentin.  - Prescription for Klonopin will be continued, but the patient needs to be seen every 3 months for it.    3. Psoriatic arthritis.  - Condition is well-controlled with Cosentyx infusions and meloxicam.  - Patient reports effective management with current medications.  - No new symptoms or progression noted.  - Continue current treatment regimen.    4. Pituitary adenoma.  - Stable pituitary adenoma with no recent changes.  - No current treatment is being administered.  - Patient reports no symptoms or progression.  - Continue monitoring with routine follow-ups.    5. Hypertension.  - Managed with hydrochlorothiazide 25 mg, Lipitor 20 mg, and lisinopril 30 mg.  - Blood pressure reading of 112/70 and heart rate of 72 are

## 2025-07-03 ENCOUNTER — HOSPITAL ENCOUNTER (OUTPATIENT)
Dept: CT IMAGING | Age: 65
Discharge: HOME | End: 2025-07-03
Payer: COMMERCIAL

## 2025-07-03 DIAGNOSIS — E78.2 MIXED HYPERLIPIDEMIA: ICD-10-CM

## 2025-07-03 PROCEDURE — 75571 CT HRT W/O DYE W/CA TEST: CPT

## 2025-08-04 ENCOUNTER — PATIENT MESSAGE (OUTPATIENT)
Dept: FAMILY MEDICINE CLINIC | Age: 65
End: 2025-08-04

## 2025-08-04 DIAGNOSIS — F41.9 ANXIETY: ICD-10-CM

## 2025-08-04 DIAGNOSIS — G25.81 RESTLESS LEGS SYNDROME (RLS): ICD-10-CM

## 2025-08-04 RX ORDER — CLONAZEPAM 0.5 MG/1
0.25 TABLET ORAL NIGHTLY PRN
Qty: 45 TABLET | Refills: 0 | Status: SHIPPED | OUTPATIENT
Start: 2025-08-04 | End: 2025-11-02

## 2025-09-02 ENCOUNTER — PATIENT MESSAGE (OUTPATIENT)
Dept: FAMILY MEDICINE CLINIC | Age: 65
End: 2025-09-02

## 2025-09-02 DIAGNOSIS — I10 ESSENTIAL HYPERTENSION: ICD-10-CM

## 2025-09-02 RX ORDER — LISINOPRIL 30 MG/1
30 TABLET ORAL DAILY
Qty: 90 TABLET | Refills: 1 | Status: SHIPPED | OUTPATIENT
Start: 2025-09-02 | End: 2026-03-01

## (undated) DEVICE — TUBING, SUCTION, 1/4" X 12', STRAIGHT: Brand: MEDLINE

## (undated) DEVICE — FIRM 8CM: Brand: NASOPORE

## (undated) DEVICE — BLADE,CARBON-STEEL,15,STRL,DISPOSABLE,TB: Brand: MEDLINE

## (undated) DEVICE — TOWEL,OR,DSP,ST,BLUE,DLX,8/PK,10PK/CS: Brand: MEDLINE

## (undated) DEVICE — TOWEL,STOP FLAG GOLD N-W: Brand: MEDLINE

## (undated) DEVICE — SHEATH 1912000 5PK 4MM/0DEG STORZ XOMED: Brand: ENDO-SCRUB®

## (undated) DEVICE — GLOVE ORANGE PI 7 1/2   MSG9075

## (undated) DEVICE — NASAL FESS: Brand: MEDLINE INDUSTRIES, INC.

## (undated) DEVICE — ELECTRODE PT RET AD L9FT HI MOIST COND ADH HYDRGEL CORDED

## (undated) DEVICE — NEEDLE SPNL 25GA L3.5IN BLU HUB S STL REG WALL FIT STYL W/

## (undated) DEVICE — BLADE 1884004HR TRICUT 5PK M4 4MM ROTATE: Brand: TRICUT

## (undated) DEVICE — SOLUTION IV 250ML 0.9% SOD CHL PH 5 INJ USP VIAFLX PLAS

## (undated) DEVICE — 4-PORT MANIFOLD: Brand: NEPTUNE 2

## (undated) DEVICE — DRAPE,INSTRUMENT,MAGNETIC,10X16: Brand: MEDLINE

## (undated) DEVICE — DRAPE IRRIG FLD WRM W44XL44IN W/ AORN STD PRTBL INTRATEMP

## (undated) DEVICE — SPECIMEN SOCK - FEMALE: Brand: MEDI-VAC